# Patient Record
Sex: FEMALE | Race: WHITE | NOT HISPANIC OR LATINO | Employment: FULL TIME | URBAN - METROPOLITAN AREA
[De-identification: names, ages, dates, MRNs, and addresses within clinical notes are randomized per-mention and may not be internally consistent; named-entity substitution may affect disease eponyms.]

---

## 2017-01-29 ENCOUNTER — OFFICE VISIT (OUTPATIENT)
Dept: RETAIL CLINIC | Facility: CLINIC | Age: 47
End: 2017-01-29

## 2017-01-29 VITALS
TEMPERATURE: 97.5 F | RESPIRATION RATE: 16 BRPM | SYSTOLIC BLOOD PRESSURE: 106 MMHG | OXYGEN SATURATION: 99 % | HEART RATE: 82 BPM | DIASTOLIC BLOOD PRESSURE: 72 MMHG

## 2017-01-29 DIAGNOSIS — Z20.828 EXPOSURE TO THE FLU: Primary | ICD-10-CM

## 2017-01-29 PROCEDURE — 99212 OFFICE O/P EST SF 10 MIN: CPT | Performed by: NURSE PRACTITIONER

## 2017-01-29 RX ORDER — LEVOTHYROXINE AND LIOTHYRONINE 38; 9 UG/1; UG/1
60 TABLET ORAL DAILY
COMMUNITY
End: 2018-05-23 | Stop reason: ALTCHOICE

## 2017-01-29 RX ORDER — OSELTAMIVIR PHOSPHATE 75 MG/1
75 CAPSULE ORAL DAILY
Qty: 10 CAPSULE | Refills: 0 | Status: SHIPPED | OUTPATIENT
Start: 2017-01-29 | End: 2017-02-08

## 2017-01-29 RX ORDER — OSELTAMIVIR PHOSPHATE 75 MG/1
75 CAPSULE ORAL DAILY
Qty: 10 CAPSULE | Refills: 0 | Status: SHIPPED | OUTPATIENT
Start: 2017-01-29 | End: 2017-01-29

## 2017-01-29 NOTE — MR AVS SNAPSHOT
Katia CLAUDIO Cindi   1/29/2017 2:15 PM   Office Visit    Dept Phone:  [phone]   Encounter #:  [id number]    Provider:  PROVIDER BISI LACKEY   Department:  [facility]                Your Full Care Plan              Today's Medication Changes          These changes are accurate as of: 1/29/17  2:52 PM.  If you have any questions, ask your nurse or doctor.               New Medication(s)Ordered:     oseltamivir 75 MG capsule   Commonly known as:  TAMIFLU   Take 1 capsule by mouth Daily for 10 days.         Stop taking medication(s)listed here:     SYNTHROID 100 MCG tablet   Generic drug:  levothyroxine                Where to Get Your Medications      These medications were sent to [facility] [address] [place] [address] [address] AT [facility] [phone] [facility] [phone]   [address], [facility] [address]    Hours:  24-hours Phone:  [phone]     oseltamivir 75 MG capsule                  Your Updated Medication List          This list is accurate as of: 1/29/17  2:52 PM.  Always use your most recent med list.                ALPRAZolam 1 MG tablet   Commonly known as:  XANAX       atomoxetine 18 MG capsule   Commonly known as:  STRATTERA   Take 1 capsule by mouth daily.       estradiol 0.1 MG/24HR patch   Commonly known as:  MINIVELPATT VIVELLE-DOT   Place 1 patch on the skin 2 (two) times a week.       fluconazole 150 MG tablet   Commonly known as:  DIFLUCAN   Take 1 tablet by mouth daily.       hydrocortisone 2.5 % rectal cream   Commonly known as:  ANUSOL-HC   Apply rectally 2 times daily       ibuprofen 800 MG tablet   Commonly known as:  ADVIL,MOTRIN   Take 1 tablet by mouth 3 (Three) Times a Day With Meals.       multivitamin with fluoride/iron 0.25-10 MG/ML solution solution       oseltamivir 75 MG capsule   Commonly known as:  TAMIFLU   Take 1 capsule by mouth Daily for 10 days.       progesterone 100 MG capsule      Commonly known as:  PROMETRIUM       pseudoephedrine-guaifenesin  MG per 12 hr tablet   Commonly known as:  MUCINEX D   Take 1 tablet by mouth Every 12 (Twelve) Hours.       rosuvastatin 5 MG tablet   Commonly known as:  CRESTOR       Thyroid 60 MG tablet   Commonly known as:  ARMOUR               You Were Diagnosed With        Codes Comments    Exposure to the flu    -  Primary ICD-10-CM: Z20.828  ICD-9-CM: V01.79       Instructions    Flu exposure. You have been prescribed tamiflu for prophalacticly for flu exposure.     Patient Instructions History      Upcoming Appointments     Visit Type Date Time Department    OFFICE VISIT 2017  2:15 PM [facility]      [employer] Signup     [facility] [employer] allows you to send messages to your doctor, view your test results, renew your prescriptions, schedule appointments, and more. To sign up, go to [facility] and click on the Sign Up Now link in the New User? box. Enter your [employer] Activation Code exactly as it appears below along with the last four digits of your Social Security Number and your Date of Birth () to complete the sign-up process. If you do not sign up before the expiration date, you must request a new code.    [employer] Activation Code: [email or web address]  Expires: 2017  2:52 PM    If you have questions, you can email [email or web address] or call [phone] to talk to our [employer] staff. Remember, [employer] is NOT to be used for urgent needs. For medical emergencies, dial 911.               Other Info from Your Visit           Allergies     No Known Allergies      Reason for Visit     flu exposure           Vital Signs     Blood Pressure Pulse Temperature Respirations Oxygen Saturation Smoking Status    106/72 82 97.5 °F (36.4 °C) 16 99% Never Smoker      Problems and Diagnoses Noted     Exposure to the flu    -  Primary

## 2017-01-29 NOTE — PROGRESS NOTES
Subjective:     Katia Puente is a 46 y.o.     HPI   Patient presents with exposure to flu. Her son was just diagnosed with the flu in this clinic today. She has had a headache but no other symptoms. She did not have an influenza vaccine this season.      The following portions of the patient's history were reviewed and updated as appropriate: allergies, current medications, past family history, past medical history, past social history, past surgical history and problem list.      Review of Systems   Constitutional: Negative for appetite change, fatigue and fever.   HENT: Negative for congestion and sore throat.    Respiratory: Negative for cough, shortness of breath and wheezing.    Cardiovascular: Negative.    Gastrointestinal: Negative for diarrhea, nausea and vomiting.   Musculoskeletal: Negative for myalgias.   Skin: Negative for color change, pallor and rash.   Allergic/Immunologic: Positive for environmental allergies.   Neurological: Negative for dizziness, light-headedness and headaches.         Objective:      Physical Exam   Constitutional: She is oriented to person, place, and time. She appears well-developed. She is cooperative.   HENT:   Head: Normocephalic and atraumatic.   Eyes: EOM are normal. Pupils are equal, round, and reactive to light.   Neck: Normal range of motion. Neck supple.   Cardiovascular: Normal rate, regular rhythm, S1 normal, S2 normal and normal heart sounds.    Pulmonary/Chest: Effort normal and breath sounds normal.   Abdominal: Soft. Normal appearance and bowel sounds are normal. There is no tenderness.   Musculoskeletal: Normal range of motion.   Lymphadenopathy:     She has no cervical adenopathy.   Neurological: She is alert and oriented to person, place, and time.   Skin: Skin is warm, dry and intact.   Psychiatric: She has a normal mood and affect. Her speech is normal and behavior is normal.   Vitals reviewed.          Katia was seen today for flu  exposure.    Diagnoses and all orders for this visit:    Exposure to the flu    Other orders  -     Discontinue: oseltamivir (TAMIFLU) 75 MG capsule; Take 1 capsule by mouth Daily for 10 days.  -     oseltamivir (TAMIFLU) 75 MG capsule; Take 1 capsule by mouth Daily for 10 days.

## 2017-03-09 ENCOUNTER — OFFICE VISIT (OUTPATIENT)
Dept: FAMILY MEDICINE CLINIC | Facility: CLINIC | Age: 47
End: 2017-03-09

## 2017-03-09 VITALS
HEIGHT: 61 IN | TEMPERATURE: 97.9 F | HEART RATE: 96 BPM | WEIGHT: 132 LBS | DIASTOLIC BLOOD PRESSURE: 72 MMHG | RESPIRATION RATE: 16 BRPM | BODY MASS INDEX: 24.92 KG/M2 | SYSTOLIC BLOOD PRESSURE: 106 MMHG | OXYGEN SATURATION: 99 %

## 2017-03-09 DIAGNOSIS — R53.83 FATIGUE, UNSPECIFIED TYPE: Primary | ICD-10-CM

## 2017-03-09 DIAGNOSIS — F41.9 ANXIETY: ICD-10-CM

## 2017-03-09 PROCEDURE — 99214 OFFICE O/P EST MOD 30 MIN: CPT | Performed by: NURSE PRACTITIONER

## 2017-03-09 RX ORDER — BUSPIRONE HYDROCHLORIDE 10 MG/1
10 TABLET ORAL 3 TIMES DAILY
Qty: 90 TABLET | Refills: 1 | Status: SHIPPED | OUTPATIENT
Start: 2017-03-09 | End: 2017-06-16

## 2017-03-09 NOTE — PROGRESS NOTES
Subjective   Katia Puente is a 46 y.o. female.     History of Present Illness   Patient presents to office with CC of fatigue and generally not feeling well.  [facility] and management of perimenopausal symptoms.  Patient states she had her levothyroxine changed to armour thyroid at last visit and her estradiol was changed to oral estrogen and testosterone supplement.  She had lab work done yesterday at [facility] for recheck but has not heard results yet.  Patient also reports a lot of stress and anxiety due to family stress.  Her son has been in trouble at school and experimented with drugs.  She is unsure if her symptoms are related to change in medications or her stress and lack of sleep.    The following portions of the patient's history were reviewed and updated as appropriate: allergies, current medications, past family history, past medical history, past social history, past surgical history and problem list.    Review of Systems   Constitutional: Positive for fatigue. Negative for unexpected weight change.   Respiratory: Negative for shortness of breath.    Cardiovascular: Negative for chest pain and palpitations.   Psychiatric/Behavioral: Positive for decreased concentration and sleep disturbance. Negative for behavioral problems, self-injury and suicidal ideas. The patient is nervous/anxious.        Objective   Physical Exam   Constitutional: She is oriented to person, place, and time. She appears well-developed and well-nourished.   Cardiovascular: Normal rate and regular rhythm.    Pulmonary/Chest: Effort normal and breath sounds normal.   Neurological: She is alert and oriented to person, place, and time.   Psychiatric: She has a normal mood and affect. Judgment normal.   Vitals reviewed.      Assessment/Plan   Katia was seen today for fatigue and illness.    Diagnoses and all orders for this visit:    Fatigue, unspecified type  -     Comprehensive  metabolic panel  -     CBC & Differential    Anxiety  -     busPIRone (BUSPAR) 10 MG tablet; Take 1 tablet by mouth 3 (Three) Times a Day.             Called [facility] and received copy of labs.  T3 had not resulted but other thyroid labs in range.  Vitamin D and ferritin were also normal. Patient has not had CBC or CMP so will order.  She will f/u with Dr. Yusuf. She was started on buspar today for anxiety.

## 2017-03-25 ENCOUNTER — APPOINTMENT (OUTPATIENT)
Dept: GENERAL RADIOLOGY | Facility: HOSPITAL | Age: 47
End: 2017-03-25

## 2017-03-25 PROCEDURE — 71020 HC CHEST PA AND LATERAL: CPT | Performed by: FAMILY MEDICINE

## 2017-03-25 PROCEDURE — 71020 XR CHEST 2 VW: CPT | Performed by: FAMILY MEDICINE

## 2017-06-16 ENCOUNTER — OFFICE VISIT (OUTPATIENT)
Dept: OBSTETRICS AND GYNECOLOGY | Age: 47
End: 2017-06-16

## 2017-06-16 VITALS
WEIGHT: 137 LBS | DIASTOLIC BLOOD PRESSURE: 70 MMHG | HEIGHT: 62 IN | BODY MASS INDEX: 25.21 KG/M2 | SYSTOLIC BLOOD PRESSURE: 120 MMHG

## 2017-06-16 DIAGNOSIS — N63.0 BREAST LUMP: Primary | ICD-10-CM

## 2017-06-16 PROCEDURE — 99214 OFFICE O/P EST MOD 30 MIN: CPT | Performed by: NURSE PRACTITIONER

## 2017-06-16 RX ORDER — THYROID,PORK 15 MG
15 TABLET ORAL DAILY
COMMUNITY
Start: 2017-06-05 | End: 2021-11-01 | Stop reason: ALTCHOICE

## 2017-06-16 RX ORDER — DOXYCYCLINE HYCLATE 100 MG
TABLET ORAL
COMMUNITY
Start: 2017-04-23 | End: 2019-03-05 | Stop reason: SDUPTHER

## 2017-06-16 RX ORDER — CETIRIZINE HYDROCHLORIDE 5 MG/1
5 TABLET ORAL DAILY
COMMUNITY
End: 2020-12-03 | Stop reason: HOSPADM

## 2017-06-16 NOTE — PROGRESS NOTES
Subjective   Katia Puente is a 46 y.o. female is here today as a self referral.    History of Present Illness   Chief Complaint   Patient presents with   • Breast Problem     Pea sized lump - right breast. Pt started pellet therapy about 1 month ago.      Patient has noticed a small lump in right breast.  This is a new problem and she is a new patient to me.  She has noticed it for about a month.  It has gotten slightly bigger but then smaller again. She has had some similar areas in the past that have gone away.  No pain, skin changes, nipple discharge. She did recently start pellet therapy for menopausal symptoms.  She believes the first dose was too high but they adjusted it and now feels better.  She denies any family history of breast cancer.  She had a screening mammogram last august.    The following portions of the patient's history were reviewed and updated as appropriate: allergies, current medications, past family history, past medical history, past social history, past surgical history and problem list.    Review of Systems   Constitutional: Negative.    HENT: Negative.    Eyes: Negative.    Respiratory: Negative.    Cardiovascular: Negative.    Gastrointestinal: Negative.    Endocrine: Negative.    Genitourinary: Negative.    Musculoskeletal: Negative.    Skin: Negative.    Allergic/Immunologic: Negative.    Neurological: Negative.    Hematological: Negative.    Psychiatric/Behavioral: Negative.        Objective   Physical Exam   Constitutional: She is oriented to person, place, and time. She appears well-developed and well-nourished.   Pulmonary/Chest: Effort normal. Right breast exhibits mass. Right breast exhibits no inverted nipple, no nipple discharge, no skin change and no tenderness. Left breast exhibits no inverted nipple, no mass, no nipple discharge, no skin change and no tenderness.       Bilateral scars on breast from previous surgery.  Very small, mobile mass on right, inner breast.   Is is about .5cm in size and round.  No skin changes or tenderness.  It does feel very superficial.   Genitourinary:   Genitourinary Comments: No bleeding following pap   Neurological: She is alert and oriented to person, place, and time.   Skin: Skin is warm and dry.   Psychiatric: She has a normal mood and affect.         Assessment/Plan   Katia was seen today for breast problem.    Diagnoses and all orders for this visit:    Breast lump  -     Mammo Diagnostic Right With CAD  -     US breast right complete      Plan mammogram and ultrasound and follow up in 1 month for breast check and AE as scheduled.

## 2017-06-21 ENCOUNTER — HOSPITAL ENCOUNTER (OUTPATIENT)
Dept: ULTRASOUND IMAGING | Facility: HOSPITAL | Age: 47
Discharge: HOME | End: 2017-06-21

## 2017-06-21 ENCOUNTER — HOSPITAL ENCOUNTER (OUTPATIENT)
Dept: MAMMOGRAPHY | Facility: HOSPITAL | Age: 47
Discharge: HOME | End: 2017-06-21
Admitting: NURSE PRACTITIONER

## 2017-06-21 PROCEDURE — 76642 ULTRASOUND BREAST LIMITED: CPT

## 2017-06-21 PROCEDURE — G0206 DX MAMMO INCL CAD UNI: HCPCS

## 2017-06-28 ENCOUNTER — TELEPHONE (OUTPATIENT)
Dept: MAMMOGRAPHY | Age: 47
End: 2017-06-28

## 2017-06-28 NOTE — TELEPHONE ENCOUNTER
----- Message from Rene Kaur MD sent at 6/28/2017  8:35 AM EDT -----  Please notify patient of results and schedule recommended follow-up

## 2017-06-28 NOTE — TELEPHONE ENCOUNTER
Informed pt mammogram recommends followup bilateral mammogram in August and RT breast ultrasound to assure the stability of mass.  Put order in EP[facility].

## 2017-08-21 ENCOUNTER — OFFICE VISIT (OUTPATIENT)
Dept: OBSTETRICS AND GYNECOLOGY | Age: 47
End: 2017-08-21

## 2017-08-21 VITALS
DIASTOLIC BLOOD PRESSURE: 72 MMHG | HEIGHT: 61 IN | BODY MASS INDEX: 25.68 KG/M2 | SYSTOLIC BLOOD PRESSURE: 116 MMHG | WEIGHT: 136 LBS

## 2017-08-21 DIAGNOSIS — Z71.89 COUNSELING FOR HORMONE REPLACEMENT THERAPY: ICD-10-CM

## 2017-08-21 DIAGNOSIS — Z01.419 ENCOUNTER FOR GYNECOLOGICAL EXAMINATION: Primary | ICD-10-CM

## 2017-08-21 PROCEDURE — 99396 PREV VISIT EST AGE 40-64: CPT | Performed by: OBSTETRICS & GYNECOLOGY

## 2017-08-21 NOTE — PROGRESS NOTES
"Subjective     Chief Complaint   Patient presents with   • Gynecologic Exam     Annual:discuss increased vaginal/perineum pressure         History of Present Illness    Katia Puente is a very pleasant 47 y.o. female who presents for annual exam., Mammo Exam2017  , Contraception hyst, Exercise 2 x wkly.Patient has been on bioidentical hormones for a while and we have discussed the inherent risk that. She is exercising 2 times a week. 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 She sees Dr. swanson for her hormone therapy.      Obstetric History:  OB History      Para Term  AB TAB SAB Ectopic Multiple Living    5 2 2  3  3            Menstrual History:     No LMP recorded. Patient has had a hysterectomy.       Sexual History:       Past Medical History:   Diagnosis Date   • Allergic    • Breast pain, left    • Elevated cholesterol    • Hemorrhoids 2012   • Hypothyroidism    • Vaginal atrophy       Past Surgical History:   Procedure Laterality Date   • BREAST SURGERY     • ENDOSCOPY  2013   • HYSTERECTOMY     • TONSILLECTOMY         SOCIAL Hx:      The following portions of the patient's history were reviewed and updated as appropriate: allergies, current medications, past family history, past medical history, past social history, past surgical history and problem list.    Review of Systems        Except as outlined in history of physical illness, patient denies any changes in her GYN, , GI systems. All other systems reviewed are negative         Objective   Physical Exam    /72  Ht 60.5\" (153.7 cm)  Wt 136 lb (61.7 kg)  BMI 26.12 kg/m2    General: Patient is alert and oriented and appears overall healthy  Neck: Is supple without thyromegaly, no carotid bruits and no lymphadenopathy  Lungs: Clear bilaterally, no wheezing, rhonchi, or rales.  Respiratory rate is normal  Breast: Symmetrical, no lymphadenopathy, no masses, no erythema., Well-healed surgical " scars  Heart: Regular rate and rhythm are appreciated, no murmurs or rubs are heard  Abdomen: Is soft, without organomegaly, bowel sounds are positive, there is no                                rebound or guarding and palpation does not produce any discomfort  Back: Nontender without CVA tenderness  Pelvic: External genitalia appear normal and consistent with mature female.  BUS normal                            Vagina is clean dry without discharge and appears adequately estrogenized, no               lesions or masses are present                         Adnexa are non-enlarged, non tender               Rectal exam reveals adequate sphincter tone and no masses or lesions are                     appreciated on digital rectal examination.       Patient Active Problem List   Diagnosis   • Hemorrhoids   • Hypothyroidism   • Stress disorder, acute   • ADD (attention deficit disorder)   • Postmenopausal HRT (hormone replacement therapy)               Assessment/Plan   Katia was seen today for gynecologic exam.    Diagnoses and all orders for this visit:    Encounter for gynecological examination  -     IGP, Apt HPV,rfx 16 / 18,45    Counseling for hormone replacement therapy  Patient is having all of her hormone therapy managed by Dr. Yusuf she and I have had multiple discussions regarding the potential concerns of her therapy. She also is getting her labs including cholesterol and wellness checks there. She is due for a follow-up mammogram potential ultrasound initially at the end of August but is moved to September. I have stressed the importance of that. 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  Continue to recommend regular exercise to include cardiovascular and resistance training and breast self-exam. Wellness lab, mammography, pap smear, in accordance with age guidelines.  Dietary  management including overall nutrition, caloric intake, and adequate calcium ingestion were reviewed.        All of the patient's questions were addressed and answered, I have encouraged her to call for today's test results if she has not received them within 10 days.  Patient is advised to call with any change in her condition or with any other questions, otherwise return in 12 months for annual examination.

## 2017-08-23 LAB
CYTOLOGIST CVX/VAG CYTO: NORMAL
CYTOLOGY CVX/VAG DOC THIN PREP: NORMAL
DX ICD CODE: NORMAL
DX ICD CODE: NORMAL
HIV 1 & 2 AB SER-IMP: NORMAL
HPV I/H RISK 4 DNA CVX QL PROBE+SIG AMP: NEGATIVE
OTHER STN SPEC: NORMAL
PATH REPORT.FINAL DX SPEC: NORMAL
STAT OF ADQ CVX/VAG CYTO-IMP: NORMAL

## 2017-09-07 ENCOUNTER — HOSPITAL ENCOUNTER (OUTPATIENT)
Dept: MAMMOGRAPHY | Facility: HOSPITAL | Age: 47
Discharge: HOME | End: 2017-09-07
Attending: OBSTETRICS & GYNECOLOGY | Admitting: OBSTETRICS & GYNECOLOGY

## 2017-09-07 ENCOUNTER — HOSPITAL ENCOUNTER (OUTPATIENT)
Dept: ULTRASOUND IMAGING | Facility: HOSPITAL | Age: 47
Discharge: HOME | End: 2017-09-07
Attending: OBSTETRICS & GYNECOLOGY

## 2017-09-07 DIAGNOSIS — N63.0 BREAST MASS: ICD-10-CM

## 2017-09-07 PROCEDURE — G0204 DX MAMMO INCL CAD BI: HCPCS

## 2017-12-15 ENCOUNTER — TELEPHONE (OUTPATIENT)
Dept: OBSTETRICS AND GYNECOLOGY | Age: 47
End: 2017-12-15

## 2017-12-15 ENCOUNTER — TELEPHONE (OUTPATIENT)
Dept: FAMILY MEDICINE CLINIC | Facility: CLINIC | Age: 47
End: 2017-12-15

## 2017-12-15 NOTE — TELEPHONE ENCOUNTER
Patient calling and is experiencing a fissure. She hasn't had one in a long time and is very uncomfortable. Would like something called in for relief and OTC meds are not working. I told patient she may need an appt; please advise

## 2017-12-15 NOTE — TELEPHONE ENCOUNTER
Dr SNYDER pt, states she has been dealing with a painful anal fissure for about 10 days now but has a hx of them. Pt states OTC meds are net helping, and currently is using a sample of an Anusol rectal suppository 2.5% she received from a clinic, minor relief and very painful to use. Please advise.    Pt # [phone]

## 2017-12-15 NOTE — TELEPHONE ENCOUNTER
not'd pt. Pt states she does not want to go through surgery at this time. Wondering if there is another med that we can recommend that would help. Pt wants message sent to Dr larios.

## 2017-12-17 RX ORDER — LIDOCAINE HYDROCHLORIDE AND HYDROCORTISONE ACETATE 30; 25 MG/G; MG/G
GEL RECTAL
Qty: 21 EACH | Refills: 2 | Status: SHIPPED | OUTPATIENT
Start: 2017-12-17 | End: 2019-03-05 | Stop reason: SDUPTHER

## 2017-12-18 NOTE — TELEPHONE ENCOUNTER
In reviewing the records it looks like her family physician sent something in last week. Please notify

## 2018-04-03 ENCOUNTER — TELEPHONE (OUTPATIENT)
Dept: OBSTETRICS AND GYNECOLOGY | Age: 48
End: 2018-04-03

## 2018-04-03 NOTE — TELEPHONE ENCOUNTER
Pt states something is falling out. States no pain but something feels different down there. Pt requested to see Dr. Kaur only.

## 2018-04-16 ENCOUNTER — TELEPHONE (OUTPATIENT)
Dept: OBSTETRICS AND GYNECOLOGY | Age: 48
End: 2018-04-16

## 2018-04-23 ENCOUNTER — OFFICE VISIT (OUTPATIENT)
Dept: OBSTETRICS AND GYNECOLOGY | Age: 48
End: 2018-04-23

## 2018-04-23 VITALS
SYSTOLIC BLOOD PRESSURE: 124 MMHG | DIASTOLIC BLOOD PRESSURE: 70 MMHG | BODY MASS INDEX: 23.6 KG/M2 | HEIGHT: 61 IN | WEIGHT: 125 LBS

## 2018-04-23 DIAGNOSIS — K62.89 RECTAL PAIN: Primary | ICD-10-CM

## 2018-04-23 DIAGNOSIS — N81.6 BADEN-WALKER GRADE 1 RECTOCELE: ICD-10-CM

## 2018-04-23 PROCEDURE — 99213 OFFICE O/P EST LOW 20 MIN: CPT | Performed by: OBSTETRICS & GYNECOLOGY

## 2018-04-23 RX ORDER — THYROID 60 MG
60 TABLET ORAL DAILY
COMMUNITY
Start: 2018-03-11 | End: 2021-11-01 | Stop reason: ALTCHOICE

## 2018-04-23 RX ORDER — LISDEXAMFETAMINE DIMESYLATE 30 MG/1
CAPSULE ORAL
COMMUNITY
Start: 2018-04-03 | End: 2019-03-05 | Stop reason: SDUPTHER

## 2018-04-23 NOTE — PROGRESS NOTES
Subjective     Chief Complaint   Patient presents with   • Gynecologic Exam     Problem:pelvic pain,rectal/vaginal pressure,rectal bleeding       History of Present Illness  Katia Puente is a 47 y.o. female is being seen today for Evaluation of ongoing rectal pain and possible rectocele. Patient underwent a TVH and posterior repair and bladder sling with another physician 8 years ago  She felt something bulging several weeks ago after some exertion. She also had some discomfort at that time. That discomfort has resolved. She has no pain now and has had no bleeding    Additionally she is been treated by her PCP for possible anal fissures. She is known to have a hemorrhoid that is not currently bothersome  Chief Complaint   Patient presents with   • Gynecologic Exam     Problem:pelvic pain,rectal/vaginal pressure,rectal bleeding   .        The following portions of the patient's history were reviewed and updated as appropriate: allergies, current medications, past family history, past medical history, past social history, past surgical history and problem list.    PAST MEDICAL HISTORY  Past Medical History:   Diagnosis Date   • Allergic    • Breast pain, left    • Elevated cholesterol    • Hemorrhoids 2012   • Hypothyroidism    • Vaginal atrophy      OB History      Para Term  AB Living    5 2 2  3     SAB TAB Ectopic Molar Multiple Live Births    3             Past Surgical History:   Procedure Laterality Date   • BREAST SURGERY     • ENDOSCOPY  2013   • HYSTERECTOMY     • TONSILLECTOMY       Family History   Problem Relation Age of Onset   • Heart disease Mother      History   Smoking Status   • Never Smoker   Smokeless Tobacco   • Never Used       Current Outpatient Prescriptions:   •  ARMOUR THYROID 15 MG tablet, , Disp: , Rfl:   •  ARMOUR THYROID 90 MG tablet, , Disp: , Rfl:   •  cetirizine (zyrTEC) 5 MG tablet, Take 5 mg by mouth., Disp: , Rfl:   •  Ped Multivitamins-Fl-Iron  (MULTIVITAMIN WITH FLUORIDE/IRON) 0.25-10 MG/ML solution solution, Take  by mouth daily., Disp: , Rfl:   •  progesterone (PROMETRIUM) 100 MG capsule, Take 100 mg by mouth daily., Disp: , Rfl:   •  rosuvastatin (CRESTOR) 5 MG tablet, Take 5 mg by mouth daily. Take 1 tablet by oral route once daily for 90 days, Disp: , Rfl:   •  Thyroid 60 MG PO tablet, Take 60 mg by mouth Daily., Disp: , Rfl:   •  doxycycline (VIBRAMYICN) 100 MG tablet, , Disp: , Rfl:   •  Lidocaine-Hydrocortisone Ace 3-2.5 % kit, Use TID prn, Disp: 21 each, Rfl: 2  •  VYVANSE 30 MG capsule, , Disp: , Rfl:     There is no immunization history on file for this patient.    Review of Systems       Except as outlined in history of physical illness, patient denies any changes in her GYN, , GI systems. All other systems reviewed are negative.    Objective   Physical Exam   Alert and oriented, respirations unlabored, heart regular rate and rhythm   Pelvic external genitalia normal, vagina is clean dry and intact, there is adequate estrogenization. Vaginal vault seems well supported no cystocele. She has a first-degree rectocele with bearing down and coughing. Additionally there is a small nonthrombosed hemorrhoid that is asymptomatic. Rectal exam is uncomfortable and it simply confirms the first-degree rectocele.      Assessment/Plan   Katia was seen today for gynecologic exam.    Diagnoses and all orders for this visit:    Rectal pain  As outlined above. Patient is status post TVH, sling, posterior repair that was performed by another physician approximately 8 years ago. She is presumed to potentially have some fissures based on prior histories. She would like further evaluation for this.  -     Ambulatory Referral to Colorectal Surgery    [place] grade 1 rectocele  This is new as outlined above, because it is only a grade 1 she does not want to proceed with any further therapy at this stage. Talked about potential treatment options including  physical therapy, observation, repeat surgical repairs. I would have to get the operative notes from her previous gynecologist if she wants to proceed along those lines. Currently she is leaning towards observation and I would agree               EMR Dragon/ Transcription disclaimer:  Much of the encounter note is an electronic transcription/translation of spoken language to printed text. The electronic translation of spoken language may permit erroneous, or at times, nonessential words or phrases to be inadvertently transcribes; Although i have reviewed the note for such errors, some may still exist.

## 2018-05-14 ENCOUNTER — OFFICE VISIT (OUTPATIENT)
Dept: FAMILY MEDICINE CLINIC | Facility: CLINIC | Age: 48
End: 2018-05-14

## 2018-05-14 VITALS
SYSTOLIC BLOOD PRESSURE: 110 MMHG | HEIGHT: 61 IN | DIASTOLIC BLOOD PRESSURE: 66 MMHG | WEIGHT: 128 LBS | RESPIRATION RATE: 16 BRPM | TEMPERATURE: 97.6 F | HEART RATE: 87 BPM | BODY MASS INDEX: 24.17 KG/M2

## 2018-05-14 DIAGNOSIS — M54.10 RADICULAR LOW BACK PAIN: Primary | ICD-10-CM

## 2018-05-14 PROCEDURE — 99214 OFFICE O/P EST MOD 30 MIN: CPT | Performed by: FAMILY MEDICINE

## 2018-05-14 RX ORDER — TRAMADOL HYDROCHLORIDE 50 MG/1
50 TABLET ORAL EVERY 6 HOURS PRN
Qty: 40 TABLET | Refills: 1 | Status: SHIPPED | OUTPATIENT
Start: 2018-05-14 | End: 2019-03-05 | Stop reason: SDUPTHER

## 2018-05-14 RX ORDER — DICLOFENAC 35 MG/1
35 CAPSULE ORAL 3 TIMES DAILY
Qty: 90 CAPSULE | Refills: 2 | Status: SHIPPED | OUTPATIENT
Start: 2018-05-14 | End: 2018-08-14 | Stop reason: SDUPTHER

## 2018-05-14 RX ORDER — CYCLOBENZAPRINE HCL 10 MG
10 TABLET ORAL 2 TIMES DAILY PRN
Qty: 60 TABLET | Refills: 2 | Status: SHIPPED | OUTPATIENT
Start: 2018-05-14 | End: 2019-03-05 | Stop reason: SDUPTHER

## 2018-05-14 RX ORDER — METHYLPREDNISOLONE 4 MG/1
TABLET ORAL
Qty: 21 TABLET | Refills: 0 | Status: SHIPPED | OUTPATIENT
Start: 2018-05-14 | End: 2018-05-23

## 2018-05-14 NOTE — PROGRESS NOTES
"Subjective   Katia Puente is a 47 y.o. female.     CC: Back/Hip Pain    History of Present Illness     Pt comes in today c/o right back/hip pain for > 2 weeks. Went to chiropractor yet sadly not better. Does have a h/o moderate scoliosis known. No relief with the chiropractor and did have to move her daughter last weekend (was hurting prior). Getting up/down hurts most and feels better when active, until stops, then worse. All started first time when arising from a chair.   The pain radiates from the right back to the right hamstring, then foot. \"Shooting pain\". Aleve not helpful this time, either.   Had recent XRAYs of the back at her chiropractor.    The following portions of the patient's history were reviewed and updated as appropriate: allergies, current medications, past family history, past medical history, past social history, past surgical history and problem list.    Review of Systems   Constitutional: Negative for activity change, chills, fatigue and fever.   Respiratory: Negative for cough and chest tightness.    Cardiovascular: Negative for chest pain and palpitations.   Gastrointestinal: Negative for abdominal pain and nausea.   Endocrine: Negative for cold intolerance.   Musculoskeletal: Positive for back pain.        Hip pain   Psychiatric/Behavioral: Negative for behavioral problems and dysphoric mood.     /66   Pulse 87   Temp 97.6 °F (36.4 °C) (Oral)   Resp 16   Ht 154.9 cm (61\")   Wt 58.1 kg (128 lb)   BMI 24.19 kg/m²     Objective   Physical Exam   Constitutional: She appears well-developed and well-nourished. She appears distressed.   Neck: Neck supple. No thyromegaly present.   Cardiovascular: Normal rate and regular rhythm.    No murmur heard.  Pulmonary/Chest: Effort normal and breath sounds normal.   Abdominal: Bowel sounds are normal.   Neurological: She has normal reflexes.   Positive SLR right; along with pain to palp.of the right facet region, lower L-Spine and the " right SI joint, too.   Psychiatric: She has a normal mood and affect. Her behavior is normal.   Nursing note and vitals reviewed.  Luisa present for exam.    Assessment/Plan   Katia was seen today for hip pain, sciatica and back pain.    Diagnoses and all orders for this visit:    Radicular low back pain  -     MethylPREDNISolone (MEDROL) 4 MG tablet; follow package directions  -     Diclofenac 35 MG capsule; Take 35 mg by mouth 3 (Three) Times a Day.  -     cyclobenzaprine (FLEXERIL) 10 MG tablet; Take 1 tablet by mouth 2 (Two) Times a Day As Needed for Muscle Spasms.  -     traMADol (ULTRAM) 50 MG tablet; Take 1 tablet by mouth Every 6 (Six) Hours As Needed for Moderate Pain .  -     MRI Lumbar Spine Without Contrast; Future

## 2018-05-18 ENCOUNTER — APPOINTMENT (OUTPATIENT)
Dept: MRI IMAGING | Facility: HOSPITAL | Age: 48
End: 2018-05-18

## 2018-05-18 ENCOUNTER — HOSPITAL ENCOUNTER (EMERGENCY)
Facility: HOSPITAL | Age: 48
Discharge: HOME | End: 2018-05-18
Attending: EMERGENCY MEDICINE | Admitting: EMERGENCY MEDICINE

## 2018-05-18 VITALS
SYSTOLIC BLOOD PRESSURE: 121 MMHG | OXYGEN SATURATION: 97 % | RESPIRATION RATE: 16 BRPM | TEMPERATURE: 97 F | BODY MASS INDEX: 23.6 KG/M2 | HEART RATE: 72 BPM | WEIGHT: 125 LBS | DIASTOLIC BLOOD PRESSURE: 72 MMHG | HEIGHT: 61 IN

## 2018-05-18 DIAGNOSIS — M51.26 PROTRUSION OF LUMBAR INTERVERTEBRAL DISC: Primary | ICD-10-CM

## 2018-05-18 DIAGNOSIS — M54.16 COMPRESSION OF LUMBAR NERVE ROOT: ICD-10-CM

## 2018-05-18 PROCEDURE — 72148 MRI LUMBAR SPINE W/O DYE: CPT

## 2018-05-18 PROCEDURE — 99283 EMERGENCY DEPT VISIT LOW MDM: CPT

## 2018-05-18 PROCEDURE — 25010000002 DEXAMETHASONE PER 1 MG: Performed by: EMERGENCY MEDICINE

## 2018-05-18 PROCEDURE — 96374 THER/PROPH/DIAG INJ IV PUSH: CPT

## 2018-05-18 RX ORDER — HYDROCODONE BITARTRATE AND ACETAMINOPHEN 5; 325 MG/1; MG/1
1 TABLET ORAL EVERY 6 HOURS PRN
Qty: 20 TABLET | Refills: 0 | Status: SHIPPED | OUTPATIENT
Start: 2018-05-18 | End: 2019-03-05 | Stop reason: SDUPTHER

## 2018-05-18 RX ORDER — DEXAMETHASONE 4 MG/1
4 TABLET ORAL 3 TIMES DAILY
Qty: 20 TABLET | Refills: 0 | Status: SHIPPED | OUTPATIENT
Start: 2018-05-18 | End: 2019-03-05 | Stop reason: SDUPTHER

## 2018-05-18 RX ORDER — GABAPENTIN 300 MG/1
300 CAPSULE ORAL 3 TIMES DAILY
Qty: 60 CAPSULE | Refills: 0 | Status: SHIPPED | OUTPATIENT
Start: 2018-05-18 | End: 2019-03-05 | Stop reason: SDUPTHER

## 2018-05-18 RX ADMIN — DEXAMETHASONE SODIUM PHOSPHATE 10 MG: 10 INJECTION INTRAMUSCULAR; INTRAVENOUS at 14:57

## 2018-05-18 NOTE — DISCHARGE INSTRUCTIONS
Call Dr. Yu's office on Monday (5-21-18) to schedule an appointment for follow up. Return to the ED if you develop foot drop. Foot drop can be tested by trying to walk on your heels. If you are unable to walk on your heels, return to the ED for further treatment.

## 2018-05-18 NOTE — ED PROVIDER NOTES
" EMERGENCY DEPARTMENT ENCOUNTER    CHIEF COMPLAINT  Chief Complaint: Lower back pain  History given by: Patient  History limited by: Nothing  Room Number: 33/33  PMD: Rene Rivera MD      HPI:  Pt is a 47 y.o. female with a hx of scoliosis who presents complaining of lower back pain that began about 1 month ago with symptoms worsening in severity to the point she came to the ED. The pt states she \"threw\" her back out 1 month ago. The pt states she tried to get out of a chair when her back \"locked up\" on her 1 month ago. The pt states the pain gradually improved, but it has begun worsen in her lower back. The pt states the pain now radiates down her anterior right leg. The pt states the pain is improved with rest and worsened with bearing weight. Pt reports right leg weakness, trouble with urination and defecation, and saddle paresthesia.     Duration: 1 month with symptoms worsening in severity to the point she came to the ED  Onset: Gradual  Timing: Constant  Location: Lower back  Radiation: Anterior right leg  Quality: \"Pain\"  Intensity/Severity: Moderate  Progression: Worsening  Associated Symptoms:  Right leg weakness, trouble with urination and defecation, and saddle paresthesia  Aggravating Factors: Bearing weight  Alleviating Factors: Rest  Previous Episodes: The pt has a hx of scoliosis.  Treatment before arrival: Nothing    PAST MEDICAL HISTORY  Active Ambulatory Problems     Diagnosis Date Noted   • Hemorrhoids 05/02/2012   • Hypothyroidism    • Stress disorder, acute 11/09/2015   • ADD (attention deficit disorder) 09/08/2016   • Postmenopausal HRT (hormone replacement therapy) 09/08/2016     Resolved Ambulatory Problems     Diagnosis Date Noted   • No Resolved Ambulatory Problems     Past Medical History:   Diagnosis Date   • Allergic    • Breast pain, left    • Elevated cholesterol    • Hemorrhoids 05/02/2012   • Hypothyroidism    • Vaginal atrophy        PAST SURGICAL HISTORY  Past Surgical History: "   Procedure Laterality Date   • BREAST SURGERY     • ENDOSCOPY  01/2013   • HYSTERECTOMY  2008   • TONSILLECTOMY         FAMILY HISTORY  Family History   Problem Relation Age of Onset   • Heart disease Mother        SOCIAL HISTORY  Social History     Social History   • Marital status:      Spouse name: N/A   • Number of children: N/A   • Years of education: N/A     Occupational History   • Not on file.     Social History Main Topics   • Smoking status: Never Smoker   • Smokeless tobacco: Never Used   • Alcohol use Yes      Comment: current some day; occ   • Drug use: No   • Sexual activity: Yes     Partners: Male     Birth control/ protection: Surgical     Other Topics Concern   • Not on file     Social History Narrative   • No narrative on file       ALLERGIES  Codeine and Erythromycin    REVIEW OF SYSTEMS  Review of Systems   Constitutional: Negative for fever.   HENT: Negative for sore throat.    Eyes: Negative.    Respiratory: Negative for cough and shortness of breath.    Cardiovascular: Negative for chest pain.   Gastrointestinal: Negative for abdominal pain, diarrhea and vomiting.   Genitourinary: Negative for dysuria.        Trouble with urination and defecation   Musculoskeletal: Positive for back pain (Lower that radiates down the anterior right leg). Negative for neck pain.   Skin: Negative for rash.   Allergic/Immunologic: Negative.    Neurological: Positive for weakness (Right leg) and numbness (Saddle paresthesia). Negative for headaches.   Hematological: Negative.    Psychiatric/Behavioral: Negative.    All other systems reviewed and are negative.      PHYSICAL EXAM  ED Triage Vitals   Temp Heart Rate Resp BP SpO2   05/18/18 1122 05/18/18 1122 05/18/18 1122 05/18/18 1141 05/18/18 1122   97 °F (36.1 °C) 84 16 140/92 97 %      Temp src Heart Rate Source Patient Position BP Location FiO2 (%)   -- -- 05/18/18 1141 -- --     Sitting         Physical Exam   Constitutional: She is oriented to person,  place, and time and well-developed, well-nourished, and in no distress. No distress.   HENT:   Head: Normocephalic and atraumatic.   Eyes: EOM are normal. Pupils are equal, round, and reactive to light.   Neck: Normal range of motion. Neck supple.   Cardiovascular: Normal rate, regular rhythm and normal heart sounds.    Pulmonary/Chest: Effort normal and breath sounds normal. No respiratory distress.   Abdominal: Soft. There is no tenderness. There is no rebound and no guarding.   Musculoskeletal: Normal range of motion. She exhibits no edema.   Neurological: She is alert and oriented to person, place, and time. She has normal sensation and normal strength.   Reflex Scores:       Patellar reflexes are 2+ on the right side.       Achilles reflexes are 1+ on the right side.  The pt has slightly decreased plantar flexion on the right when compared to the left.   Skin: Skin is warm and dry. No rash noted.   Psychiatric: Mood and affect normal.   Nursing note and vitals reviewed.      RADIOLOGY  MRI Lumbar Spine Without Contrast - Disc protrusion at L4-L5 with compression of the L4-L5 nerve root on the right with no involvement of the cauda equina      I ordered the above noted radiological studies. Interpreted by radiologist. Discussed with radiologist (Dr. Carnes). Reviewed by me in PACS.       PROCEDURES  Procedures      PROGRESS AND CONSULTS       1409  MRI Lumbar Spine ordered for further evaluation.     1414  Decadron ordered.    1415  Upon initial encounter, discussed the plan to order a MRI for further evaluation. Discussed the plan to treat the pt with steroids to improve her symptoms. The pt understands and agrees with the plan.    1604  BP- 125/78 HR- 78 Temp- 97 °F (36.1 °C) O2 sat- 99%    Rechecked pt, she is resting comfortably. The pt is now being taken to get the MRI.    1714  Call placed to Neurosurgery.    1724  Received a call from Dr. Yu and discussed pt's case. Dr. Yu states he will view the  pt's MRI to look for emergent surgical problems. He will call back once he views the MRI.    1740  BP- 121/72 HR- 80 Temp- 97 °F (36.1 °C) O2 sat- 98%    Rechecked pt, her pain has improved after receiving the Decadron. Discussed the MRI results with the pt including the disc protrusion with compression of the nerve root at L4-L5 seen on the MRI. Discussed the call with Dr. Yu, and the plan to have him view the pt's MRI to look for emergent surgical problems. I informed the pt that if Dr. Yu does not find any emergent surgical problems, the pt will likely be discharged with steroids and the plan to f/u with Dr. Yu in the office. The pt understands and agrees with the plan.    1745  I received a call back from Dr. Yu. He has viewed the MRI and states the pt can be discharged home with steroids and the plan to f/u in the office.     1753  BP- 121/72 HR- 80 Temp- 97 °F (36.1 °C) O2 sat- 98%    Rechecked pt, she is resting comfortably. Discussed the call with Dr. Yu and the plan to discharge the pt with steroids and the plan to call Dr. Yu's office on Monday. I advised the pt to return to the ED if she develops foot drop at home. I advised the pt to avoid going to the chiropractor at this time. The pt understands and agrees with the plan.    MEDICAL DECISION MAKING  Results were reviewed/discussed with the patient and they were also made aware of online access. Pt also made aware that some labs, such as cultures, will not be resulted during ER visit and follow up with PMD is necessary.     MDM  Number of Diagnoses or Management Options     Amount and/or Complexity of Data Reviewed  Tests in the radiology section of CPT®: ordered and reviewed (MRI Lumbar Spine Without Contrast - Disc protrusion at L4-L5 with compression of the L4-L5 nerve root on the right with no involvement of the cauda equina)  Discuss the patient with other providers: yes (Dr. Yu, Neurosurgery  Dr. Carnes, Radiology)    Patient  Progress  Patient progress: stable         DIAGNOSIS  Final diagnoses:   Protrusion of lumbar intervertebral disc (L4-L5)   Compression of lumbar nerve root (L4-L5)       DISPOSITION  DISCHARGE    Patient discharged in stable condition.    Reviewed implications of results, diagnosis, meds, responsibility to follow up, warning signs and symptoms of possible worsening, potential complications and reasons to return to ER.    Patient/Family voiced understanding of above instructions.    Discussed plan for discharge, as there is no emergent indication for admission. Patient referred to primary care provider for BP management due to today's BP. Pt/family is agreeable and understands need for follow up and repeat testing.  Pt is aware that discharge does not mean that nothing is wrong but it indicates no emergency is present that requires admission and they must continue care with follow-up as given below or physician of their choice.     FOLLOW-UP  Gavino Yu IV, MD  [address] [address]  [phone]    Call in 3 days           Medication List      New Prescriptions    dexamethasone 4 MG tablet  Commonly known as:  DECADRON  Take 1 tablet by mouth 3 (Three) Times a Day.     gabapentin 300 MG capsule  Commonly known as:  NEURONTIN  Take 1 capsule by mouth 3 (Three) Times a Day.     HYDROcodone-acetaminophen 5-325 MG per tablet  Commonly known as:  NORCO  Take 1 tablet by mouth Every 6 (Six) Hours As Needed for Severe Pain .              Latest Documented Vital Signs:  As of 6:04 PM  BP- 121/72 HR- 80 Temp- 97 °F (36.1 °C) O2 sat- 98%    --  Documentation assistance provided by lurdes Zafar for Dr. Gant.  Information recorded by the lurdes was done at my direction and has been verified and validated by me.       Dawit Zafar  05/18/18 1800       Andres Gant MD  05/18/18 1912

## 2018-05-18 NOTE — ED TRIAGE NOTES
Patient to er with c/o lower back pain with pain moving down her right leg. Patient stated her muscles in her right leg are now getting weak. Patient stated in triage it's hard for her to have BM because it seems like her muscles are too weak. Patient reported her primary doctor is trying to get her in for MRI. Patient stated she can not wait because of the increase in pain.

## 2018-05-21 ENCOUNTER — TELEPHONE (OUTPATIENT)
Dept: NEUROSURGERY | Facility: CLINIC | Age: 48
End: 2018-05-21

## 2018-05-21 NOTE — TELEPHONE ENCOUNTER
Per Dr Yu needs a follow up early next week as a workin. Has a pretty big lumbar disc. She will be a new patient     I have left her a voice mail to schedule an appointment

## 2018-05-23 ENCOUNTER — OFFICE VISIT (OUTPATIENT)
Dept: NEUROSURGERY | Facility: CLINIC | Age: 48
End: 2018-05-23

## 2018-05-23 VITALS
WEIGHT: 128 LBS | SYSTOLIC BLOOD PRESSURE: 118 MMHG | DIASTOLIC BLOOD PRESSURE: 86 MMHG | BODY MASS INDEX: 24.17 KG/M2 | HEIGHT: 61 IN

## 2018-05-23 DIAGNOSIS — M54.10 RADICULOPATHY OF LEG: Primary | ICD-10-CM

## 2018-05-23 PROCEDURE — 99244 OFF/OP CNSLTJ NEW/EST MOD 40: CPT | Performed by: NEUROLOGICAL SURGERY

## 2018-05-23 NOTE — PROGRESS NOTES
Subjective   Patient ID: Katia Puente is a 47 y.o. female who is here today for follow-up for lumbar pain. She had an MRI of the lumbar spine at [facility] on 5/18/18. She presents unaccompanied.    History of Present Illness 48 yo lady presented to the ER last week for pain and subjective weakness in the RLE.  Mild back pain.  Her pain pain goes to the right fott to the large toe.  The numbness is intermittent.  Changes in position aggravate.  Her pain can be a 9-10 at times.  Her pain is present all time.  This started about 4-5 weeks ago.  Her pain was initailly in the back and then transitioned to the leg.  No loss of b/b.  She feels she empties her bladder.  She has some constipation.  No numbness of the perineum.  She was started on steroids and these helped to some degree as did tramadol.  She responded to steroids in the ER.      The following portions of the patient's history were reviewed and updated as appropriate: allergies, current medications, past family history, past medical history, past social history, past surgical history and problem list.    Review of Systems   Musculoskeletal: Positive for arthralgias (RLE) and back pain.   Neurological: Positive for weakness (RLE) and numbness (RLE).       Objective   Physical Exam   Constitutional: She is oriented to person, place, and time.   Neurological: She is oriented to person, place, and time.   Reflex Scores:       Patellar reflexes are 2+ on the right side and 2+ on the left side.       Achilles reflexes are 1+ on the right side and 1+ on the left side.    Neurologic Exam     Mental Status   Oriented to person, place, and time.     Motor Exam     Strength   Right iliopsoas: 5/5  Left iliopsoas: 5/5  Right quadriceps: 5/5  Left quadriceps: 5/5  Right anterior tibial: 5/5  Left anterior tibial: 5/5  Right gastroc: 5/5  Left gastroc: 5/5  5/5 EHL     Sensory Exam   Right leg light touch: decreased from toes  Left leg light touch: normal  Right leg  pinprick: decreased from toes  Left leg pinprick: normal    Gait, Coordination, and Reflexes     Gait  Gait: (antalgic)    Reflexes   Right patellar: 2+  Left patellar: 2+  Right achilles: 1+  Left achilles: 1+  Right Feldman: absent  Left Feldman: absent  Right ankle clonus: absent  Left ankle clonus: absent     Very antalgic gait.    Small scar on her back      Assessment/Plan   Independent Review of Radiographic Studies:  Right sided L45 and L5S1 disc herniations.  There is a free fragment at L5S1 and what looks like a SL disc bulge at L45.  Mild scoliosis.      Medical Decision Making:  SHe is failing oral steroids and has failed nsaids as well.  She does not care for pain medications.  We discussed that an ILANA would likely not assist.  She is poorly tolerant of gabapentin.  She will likely not tolerate PT.  I offered her a minimally invasive discectomy at L45 and L5S1 on the right.  We discussed r/b/a in great detail.  We discussed csf leaks, need for other procedures, recurrence, nerve damage, weakness, numbness, b/b issues, medical complications.  We discussed post-op course.  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  She will consider her options and call.      Katia was seen today for back pain.    Diagnoses and all orders for this visit:    Radiculopathy of leg      No Follow-up on file.

## 2018-06-21 ENCOUNTER — PATIENT MESSAGE (OUTPATIENT)
Dept: FAMILY MEDICINE CLINIC | Facility: CLINIC | Age: 48
End: 2018-06-21

## 2018-08-14 DIAGNOSIS — M54.10 RADICULAR LOW BACK PAIN: ICD-10-CM

## 2018-08-14 RX ORDER — DICLOFENAC 35 MG/1
CAPSULE ORAL
Qty: 90 CAPSULE | Refills: 0 | OUTPATIENT
Start: 2018-08-14 | End: 2019-05-05

## 2019-02-26 ENCOUNTER — TELEPHONE (OUTPATIENT)
Dept: OBSTETRICS AND GYNECOLOGY | Age: 49
End: 2019-02-26

## 2019-02-26 NOTE — TELEPHONE ENCOUNTER
FYI:  Dr Kaur pt states she has been treated both otc and with prescriptions for yeast, possible uti also as she is almost finished with her bactrim rx. Yet, pt reports she still is having vaginal burning, irritation and itching. Pt requests to be seen this week, pt is scheduled w NP 3/1/19.

## 2019-03-05 ENCOUNTER — OFFICE VISIT (OUTPATIENT)
Dept: OBSTETRICS AND GYNECOLOGY | Age: 49
End: 2019-03-05

## 2019-03-05 VITALS
BODY MASS INDEX: 22.24 KG/M2 | HEIGHT: 61 IN | SYSTOLIC BLOOD PRESSURE: 118 MMHG | DIASTOLIC BLOOD PRESSURE: 82 MMHG | WEIGHT: 117.8 LBS

## 2019-03-05 DIAGNOSIS — Z87.440 HISTORY OF URINARY TRACT INFECTION: ICD-10-CM

## 2019-03-05 DIAGNOSIS — N76.1 SUBACUTE VAGINITIS: Primary | ICD-10-CM

## 2019-03-05 PROCEDURE — 99213 OFFICE O/P EST LOW 20 MIN: CPT | Performed by: NURSE PRACTITIONER

## 2019-03-05 RX ORDER — ALPRAZOLAM 0.5 MG/1
0.5 TABLET ORAL NIGHTLY PRN
COMMUNITY
Start: 2019-02-13

## 2019-03-05 RX ORDER — NYSTATIN 100000 U/G
OINTMENT TOPICAL
Refills: 0 | COMMUNITY
Start: 2019-02-23 | End: 2019-12-13 | Stop reason: HOSPADM

## 2019-03-05 RX ORDER — COVID-19 ANTIGEN TEST
KIT MISCELLANEOUS
COMMUNITY
End: 2019-05-05

## 2019-03-05 NOTE — PROGRESS NOTES
"Subjective   Katia Puente is a 48 y.o. female is being seen today for   Chief Complaint   Patient presents with   • Follow-up   .    History of Present Illness     Patient here to follow up after being treated for UTI a few weeks ago.  She was treated over the phone by another doctor for UTI and possible yeast infection.  Her UTI symptoms improved with bactrim but she continued to have some vaginal irritation and itching after diflucan and monistat.  Since she called to schedule the visit her symptoms have mostly resolved and she currently only has some slight vaginal irritation.  No current itching or change in discharge. She still wanted to recheck her urine and make sure the UTI was gone. She see's a \"hormone specialist\" and uses bio-identical hormones as well and her menopausal symptoms have been well controlled with that.    The following portions of the patient's history were reviewed and updated as appropriate: allergies, current medications, past family history, past medical history, past social history, past surgical history and problem list.    /82   Ht 154.9 cm (61\")   Wt 53.4 kg (117 lb 12.8 oz)   BMI 22.26 kg/m²         Review of Systems   Constitutional: Negative.    HENT: Negative.    Eyes: Negative.    Respiratory: Negative.    Cardiovascular: Negative.    Gastrointestinal: Negative.    Endocrine: Negative.    Genitourinary: Negative.         Vaginal irritation   Musculoskeletal: Negative.    Skin: Negative.    Allergic/Immunologic: Negative.    Neurological: Negative.    Hematological: Negative.    Psychiatric/Behavioral: Negative.        Objective   Physical Exam   Constitutional: She is oriented to person, place, and time. She appears well-developed and well-nourished.   Genitourinary: There is no lesion on the right labia. There is no lesion on the left labia. No erythema or bleeding in the vagina. No foreign body in the vagina. No signs of injury around the vagina.   Genitourinary " Comments: Normal exam  No lesions or abnormal discharge   Neurological: She is alert and oriented to person, place, and time.   Psychiatric: She has a normal mood and affect. Her behavior is normal.         Assessment/Plan   Katia was seen today for follow-up.    Diagnoses and all orders for this visit:    Subacute vaginitis  -     NuSwab BV & Candida - Swab, Vagina    History of urinary tract infection  -     Urine Culture - Urine, Urine, Clean Catch          Recheck urine and vaginal cultures.  Patient will call with any recurrence of symptoms or concerns.

## 2019-03-07 ENCOUNTER — PROCEDURE VISIT (OUTPATIENT)
Dept: OBSTETRICS AND GYNECOLOGY | Age: 49
End: 2019-03-07

## 2019-03-07 ENCOUNTER — APPOINTMENT (OUTPATIENT)
Dept: WOMENS IMAGING | Facility: HOSPITAL | Age: 49
End: 2019-03-07

## 2019-03-07 DIAGNOSIS — Z12.31 VISIT FOR SCREENING MAMMOGRAM: Primary | ICD-10-CM

## 2019-03-07 LAB
A VAGINAE DNA VAG QL NAA+PROBE: NORMAL SCORE
BACTERIA UR CULT: NO GROWTH
BACTERIA UR CULT: NORMAL
BVAB2 DNA VAG QL NAA+PROBE: NORMAL SCORE
C ALBICANS DNA VAG QL NAA+PROBE: NEGATIVE
C GLABRATA DNA VAG QL NAA+PROBE: NEGATIVE
MEGA1 DNA VAG QL NAA+PROBE: NORMAL SCORE

## 2019-03-07 PROCEDURE — 77067 SCR MAMMO BI INCL CAD: CPT | Performed by: RADIOLOGY

## 2019-03-07 PROCEDURE — 77067 SCR MAMMO BI INCL CAD: CPT | Performed by: OBSTETRICS & GYNECOLOGY

## 2019-05-05 ENCOUNTER — APPOINTMENT (OUTPATIENT)
Dept: GENERAL RADIOLOGY | Facility: HOSPITAL | Age: 49
End: 2019-05-05

## 2019-05-05 ENCOUNTER — HOSPITAL ENCOUNTER (EMERGENCY)
Facility: HOSPITAL | Age: 49
Discharge: HOME | End: 2019-05-05
Attending: EMERGENCY MEDICINE | Admitting: EMERGENCY MEDICINE

## 2019-05-05 VITALS
SYSTOLIC BLOOD PRESSURE: 116 MMHG | OXYGEN SATURATION: 97 % | WEIGHT: 114 LBS | RESPIRATION RATE: 16 BRPM | BODY MASS INDEX: 21.52 KG/M2 | TEMPERATURE: 98 F | HEART RATE: 67 BPM | DIASTOLIC BLOOD PRESSURE: 80 MMHG | HEIGHT: 61 IN

## 2019-05-05 DIAGNOSIS — R07.89 MUSCULOSKELETAL CHEST PAIN: Primary | ICD-10-CM

## 2019-05-05 LAB
ALBUMIN SERPL-MCNC: 4.3 G/DL (ref 3.5–5.2)
ALBUMIN/GLOB SERPL: 1.6 G/DL
ALP SERPL-CCNC: 47 U/L (ref 39–117)
ALT SERPL W P-5'-P-CCNC: 15 U/L (ref 1–33)
ANION GAP SERPL CALCULATED.3IONS-SCNC: 8.7 MMOL/L
AST SERPL-CCNC: 16 U/L (ref 1–32)
BASOPHILS # BLD AUTO: 0.05 10*3/MM3 (ref 0–0.2)
BASOPHILS NFR BLD AUTO: 0.6 % (ref 0–1.5)
BILIRUB SERPL-MCNC: 0.2 MG/DL (ref 0.2–1.2)
BUN BLD-MCNC: 9 MG/DL (ref 6–20)
BUN/CREAT SERPL: 11.8 (ref 7–25)
CALCIUM SPEC-SCNC: 8.8 MG/DL (ref 8.6–10.5)
CHLORIDE SERPL-SCNC: 105 MMOL/L (ref 98–107)
CO2 SERPL-SCNC: 26.3 MMOL/L (ref 22–29)
CREAT BLD-MCNC: 0.76 MG/DL (ref 0.57–1)
D DIMER PPP FEU-MCNC: <0.27 MCGFEU/ML (ref 0–0.49)
DEPRECATED RDW RBC AUTO: 46.9 FL (ref 37–54)
EOSINOPHIL # BLD AUTO: 0.19 10*3/MM3 (ref 0–0.4)
EOSINOPHIL NFR BLD AUTO: 2.1 % (ref 0.3–6.2)
ERYTHROCYTE [DISTWIDTH] IN BLOOD BY AUTOMATED COUNT: 13.2 % (ref 12.3–15.4)
ERYTHROCYTE [SEDIMENTATION RATE] IN BLOOD: 4 MM/HR (ref 0–20)
GFR SERPL CREATININE-BSD FRML MDRD: 81 ML/MIN/1.73
GLOBULIN UR ELPH-MCNC: 2.7 GM/DL
GLUCOSE BLD-MCNC: 83 MG/DL (ref 65–99)
HCT VFR BLD AUTO: 44.2 % (ref 34–46.6)
HGB BLD-MCNC: 14.2 G/DL (ref 12–15.9)
IMM GRANULOCYTES # BLD AUTO: 0.02 10*3/MM3 (ref 0–0.05)
IMM GRANULOCYTES NFR BLD AUTO: 0.2 % (ref 0–0.5)
LYMPHOCYTES # BLD AUTO: 1.64 10*3/MM3 (ref 0.7–3.1)
LYMPHOCYTES NFR BLD AUTO: 18 % (ref 19.6–45.3)
MCH RBC QN AUTO: 30.9 PG (ref 26.6–33)
MCHC RBC AUTO-ENTMCNC: 32.1 G/DL (ref 31.5–35.7)
MCV RBC AUTO: 96.1 FL (ref 79–97)
MONOCYTES # BLD AUTO: 0.92 10*3/MM3 (ref 0.1–0.9)
MONOCYTES NFR BLD AUTO: 10.1 % (ref 5–12)
NEUTROPHILS # BLD AUTO: 6.27 10*3/MM3 (ref 1.7–7)
NEUTROPHILS NFR BLD AUTO: 69 % (ref 42.7–76)
NRBC BLD AUTO-RTO: 0 /100 WBC (ref 0–0.2)
NT-PROBNP SERPL-MCNC: 33.6 PG/ML (ref 5–450)
PLATELET # BLD AUTO: 488 10*3/MM3 (ref 140–450)
PMV BLD AUTO: 10.3 FL (ref 6–12)
POTASSIUM BLD-SCNC: 4.3 MMOL/L (ref 3.5–5.2)
PROT SERPL-MCNC: 7 G/DL (ref 6–8.5)
RBC # BLD AUTO: 4.6 10*6/MM3 (ref 3.77–5.28)
SODIUM BLD-SCNC: 140 MMOL/L (ref 136–145)
TROPONIN T SERPL-MCNC: <0.01 NG/ML (ref 0–0.03)
TROPONIN T SERPL-MCNC: <0.01 NG/ML (ref 0–0.03)
WBC NRBC COR # BLD: 9.09 10*3/MM3 (ref 3.4–10.8)

## 2019-05-05 PROCEDURE — 99284 EMERGENCY DEPT VISIT MOD MDM: CPT

## 2019-05-05 PROCEDURE — 80053 COMPREHEN METABOLIC PANEL: CPT | Performed by: EMERGENCY MEDICINE

## 2019-05-05 PROCEDURE — 93010 ELECTROCARDIOGRAM REPORT: CPT | Performed by: INTERNAL MEDICINE

## 2019-05-05 PROCEDURE — 85379 FIBRIN DEGRADATION QUANT: CPT | Performed by: EMERGENCY MEDICINE

## 2019-05-05 PROCEDURE — 71046 X-RAY EXAM CHEST 2 VIEWS: CPT

## 2019-05-05 PROCEDURE — 85652 RBC SED RATE AUTOMATED: CPT | Performed by: EMERGENCY MEDICINE

## 2019-05-05 PROCEDURE — 83880 ASSAY OF NATRIURETIC PEPTIDE: CPT | Performed by: EMERGENCY MEDICINE

## 2019-05-05 PROCEDURE — 93005 ELECTROCARDIOGRAM TRACING: CPT | Performed by: EMERGENCY MEDICINE

## 2019-05-05 PROCEDURE — 85025 COMPLETE CBC W/AUTO DIFF WBC: CPT | Performed by: EMERGENCY MEDICINE

## 2019-05-05 PROCEDURE — 84484 ASSAY OF TROPONIN QUANT: CPT | Performed by: EMERGENCY MEDICINE

## 2019-05-05 RX ORDER — LISDEXAMFETAMINE DIMESYLATE 50 MG
50 CAPSULE ORAL DAILY
Refills: 0 | COMMUNITY
Start: 2019-02-13

## 2019-05-05 RX ORDER — NAPROXEN 500 MG/1
500 TABLET ORAL 2 TIMES DAILY WITH MEALS
Qty: 14 TABLET | Refills: 0 | Status: SHIPPED | OUTPATIENT
Start: 2019-05-05 | End: 2019-09-04

## 2019-05-05 RX ORDER — SODIUM CHLORIDE 0.9 % (FLUSH) 0.9 %
10 SYRINGE (ML) INJECTION AS NEEDED
Status: DISCONTINUED | OUTPATIENT
Start: 2019-05-05 | End: 2019-05-05 | Stop reason: HOSPADM

## 2019-05-05 NOTE — ED NOTES
"Report received from SUMAYA Wilburn at [facility]. She states \"The pt has been having chest pain X 4 days, she has no cardiac history other than a benign murmur. Her EKG is normal and HR is 72. I'm giving her 324mg aspirin before she leaves.\"     Mary Astorga, RN  05/05/19 1044    "

## 2019-05-05 NOTE — ED NOTES
"Pt states shooting, sharp chest pain that started 4 days ago. Pain worsens when laying down to sleep. Pain is now \"achy and won't go away.\"      Karissa Mederos RN  05/05/19 0495    "

## 2019-05-05 NOTE — ED PROVIDER NOTES
" EMERGENCY DEPARTMENT ENCOUNTER    CHIEF COMPLAINT  Chief Complaint: Chest pain  History given by: Pt  History limited by: Nothing  Room Number: 12/12  PMD: Rene Rivera MD      HPI:  Pt is a 48 y.o. female who presents complaining of intermittent \"shooting\" and \"sharp\" left chest pain for the last few days that since last night has been \"squeezing.\" She states that last night her pain radiated into her L shoulder. She reports her pain is worse with laying flat. Pt also c/o intermittent dizziness for the last 2-3 weeks. She denies SOA, nausea, or diaphoresis. She reports a strong family hx of heart disease, but denies a personal hx of heart disease or HTN. She states she had a normal stress test 3-4 years ago that was unremarkable. She reports she had 324 mg of ASA PTA which improved her pain.      Duration:  A few days  Onset: gradual  Timing: intermittent  Location: left chest  Radiation: into left shoulder  Quality:  \"shooting\" and \"sharp\" for the last few days, \"squeezing\" pain since last night  Intensity/Severity: moderate  Progression: worsened last night  Associated Symptoms: intermittent dizziness  Aggravating Factors: laying flat  Alleviating Factors: none  Previous Episodes: Pt reports a strong family hx of heart disease  Treatment before arrival: She had a normal stress test 3-4 years ago    PAST MEDICAL HISTORY  Active Ambulatory Problems     Diagnosis Date Noted   • Hemorrhoids 05/02/2012   • Hypothyroidism    • Stress disorder, acute 11/09/2015   • ADD (attention deficit disorder) 09/08/2016   • Postmenopausal HRT (hormone replacement therapy) 09/08/2016   • Radiculopathy of leg 05/23/2018     Resolved Ambulatory Problems     Diagnosis Date Noted   • No Resolved Ambulatory Problems     Past Medical History:   Diagnosis Date   • Allergic    • Breast pain, left    • Elevated cholesterol    • Hemorrhoids 05/02/2012   • Hypothyroidism    • Murmur    • Vaginal atrophy        PAST SURGICAL HISTORY  Past " Surgical History:   Procedure Laterality Date   • BACK SURGERY      L4, L5 discectomy   • BREAST SURGERY     • CHOLECYSTECTOMY     • ENDOSCOPY  01/2013   • GALLBLADDER SURGERY  2016   • HEMORRHOIDECTOMY     • HYSTERECTOMY  2008   • LASIK  1998   • TONSILLECTOMY         FAMILY HISTORY  Family History   Problem Relation Age of Onset   • Heart disease Mother    • Heart disease Father    • Diabetes Paternal Aunt    • Stroke Maternal Grandmother    • Cancer Paternal Grandfather        SOCIAL HISTORY  Social History     Socioeconomic History   • Marital status:      Spouse name: Not on file   • Number of children: Not on file   • Years of education: Not on file   • Highest education level: Not on file   Tobacco Use   • Smoking status: Never Smoker   • Smokeless tobacco: Never Used   Substance and Sexual Activity   • Alcohol use: Yes     Comment: current some day; occ   • Drug use: No   • Sexual activity: Yes     Partners: Male     Birth control/protection: Surgical       ALLERGIES  Codeine and Erythromycin    REVIEW OF SYSTEMS  Review of Systems   Constitutional: Negative for fever.   HENT: Negative for sore throat.    Eyes: Negative.    Respiratory: Negative for cough and shortness of breath.    Cardiovascular: Positive for chest pain.   Gastrointestinal: Negative for abdominal pain, diarrhea and vomiting.   Genitourinary: Negative for dysuria.   Musculoskeletal: Negative for neck pain.   Skin: Negative for rash.   Neurological: Positive for dizziness. Negative for weakness, numbness and headaches.   Hematological: Negative.    Psychiatric/Behavioral: Negative.    All other systems reviewed and are negative.      PHYSICAL EXAM  ED Triage Vitals   Temp Heart Rate Resp BP SpO2   05/05/19 1054 05/05/19 1054 05/05/19 1054 05/05/19 1100 05/05/19 1054   98 °F (36.7 °C) 76 16 (!) 132/101 100 %      Temp src Heart Rate Source Patient Position BP Location FiO2 (%)   05/05/19 1054 -- -- -- --   Tympanic           Physical  Exam   Constitutional: She is oriented to person, place, and time. No distress.   HENT:   Head: Normocephalic and atraumatic.   Mouth/Throat: Oropharynx is clear and moist.   Eyes: EOM are normal. Pupils are equal, round, and reactive to light.   Neck: Normal range of motion. Neck supple.   Cardiovascular: Normal rate and regular rhythm.   Murmur (at the lower sternal border) heard.   Systolic (ejection) murmur is present with a grade of 1/6.  Pulmonary/Chest: Effort normal and breath sounds normal. No respiratory distress.   Abdominal: Soft. There is no tenderness. There is no rebound and no guarding.   Musculoskeletal: Normal range of motion. She exhibits no edema (pedal) or tenderness (calf).   Neurological: She is alert and oriented to person, place, and time. She has normal sensation and normal strength.   Skin: Skin is warm and dry. No rash noted.   Psychiatric: Mood and affect normal.   Nursing note and vitals reviewed.      LAB RESULTS  Lab Results (last 24 hours)     Procedure Component Value Units Date/Time    CBC & Differential [[id number]] Collected:  05/05/19 1114    Specimen:  Blood Updated:  05/05/19 1130    Narrative:       The following orders were created for panel order CBC & Differential.  Procedure                               Abnormality         Status                     ---------                               -----------         ------                     CBC Auto Differential[[id number]]        Abnormal            Final result                 Please view results for these tests on the individual orders.    Comprehensive Metabolic Panel [[id number]] Collected:  05/05/19 1114    Specimen:  Blood Updated:  05/05/19 1150     Glucose 83 mg/dL      BUN 9 mg/dL      Creatinine 0.76 mg/dL      Sodium 140 mmol/L      Potassium 4.3 mmol/L      Chloride 105 mmol/L      CO2 26.3 mmol/L      Calcium 8.8 mg/dL      Total Protein 7.0 g/dL      Albumin 4.30 g/dL      ALT (SGPT) 15 U/L      AST (SGOT) 16 U/L       Alkaline Phosphatase 47 U/L      Total Bilirubin 0.2 mg/dL      eGFR Non African Amer 81 mL/min/1.73      Globulin 2.7 gm/dL      A/G Ratio 1.6 g/dL      BUN/Creatinine Ratio 11.8     Anion Gap 8.7 mmol/L     Narrative:       GFR Normal >60  Chronic Kidney Disease <60  Kidney Failure <15    BNP [[id number]]  (Normal) Collected:  05/05/19 1114    Specimen:  Blood Updated:  05/05/19 1153     proBNP 33.6 pg/mL     Narrative:       Among patients with dyspnea, NT-proBNP is highly sensitive for the detection of acute congestive heart failure. In addition NT-proBNP of <300 pg/ml effectively rules out acute congestive heart failure with 99% negative predictive value.    D-dimer, Quantitative [[id number]]  (Normal) Collected:  05/05/19 1114    Specimen:  Blood Updated:  05/05/19 1213     D-Dimer, Quantitative <0.27 MCGFEU/mL     Narrative:       The Stago D-Dimer test used in conjunction with a clinical pretest probability (PTP) assessment model, has been approved by the FDA to rule out the presence of venous thromboembolism (VTE) in outpatients suspected of deep venous thrombosis (DVT) or pulmonary embolism (PE). The cut-off for negative predictive value is <0.50 MCGFEU/mL.    Troponin [[id number]]  (Normal) Collected:  05/05/19 1114    Specimen:  Blood Updated:  05/05/19 1153     Troponin T <0.010 ng/mL     Narrative:       Troponin T Reference Range:  <= 0.03 ng/mL-   Negative for AMI  >0.03 ng/mL-     Abnormal for myocardial necrosis.  Clinicians would have to utilize clinical acumen, EKG, Troponin and serial changes to determine if it is an Acute Myocardial Infarction or myocardial injury due to an underlying chronic condition.     Sedimentation Rate [[id number]]  (Normal) Collected:  05/05/19 1114    Specimen:  Blood Updated:  05/05/19 1233     Sed Rate 4 mm/hr     CBC Auto Differential [[id number]]  (Abnormal) Collected:  05/05/19 1114    Specimen:  Blood Updated:  05/05/19 1130     WBC 9.09 10*3/mm3      RBC 4.60  10*6/mm3      Hemoglobin 14.2 g/dL      Hematocrit 44.2 %      MCV 96.1 fL      MCH 30.9 pg      MCHC 32.1 g/dL      RDW 13.2 %      RDW-SD 46.9 fl      MPV 10.3 fL      Platelets 488 10*3/mm3      Neutrophil % 69.0 %      Lymphocyte % 18.0 %      Monocyte % 10.1 %      Eosinophil % 2.1 %      Basophil % 0.6 %      Immature Grans % 0.2 %      Neutrophils, Absolute 6.27 10*3/mm3      Lymphocytes, Absolute 1.64 10*3/mm3      Monocytes, Absolute 0.92 10*3/mm3      Eosinophils, Absolute 0.19 10*3/mm3      Basophils, Absolute 0.05 10*3/mm3      Immature Grans, Absolute 0.02 10*3/mm3      nRBC 0.0 /100 WBC     Troponin [[id number]]  (Normal) Collected:  05/05/19 1334    Specimen:  Blood Updated:  05/05/19 1405     Troponin T <0.010 ng/mL     Narrative:       Troponin T Reference Range:  <= 0.03 ng/mL-   Negative for AMI  >0.03 ng/mL-     Abnormal for myocardial necrosis.  Clinicians would have to utilize clinical acumen, EKG, Troponin and serial changes to determine if it is an Acute Myocardial Infarction or myocardial injury due to an underlying chronic condition.           I ordered the above labs and reviewed the results    RADIOLOGY  XR Chest 2 View   Preliminary Result   There is no evidence for an active or acute cardiopulmonary   process.               I ordered the above noted radiological studies. Interpreted by radiologist. Reviewed by me in PACS.       PROCEDURES  Procedures    EKG    EKG time: 1058  Rhythm/Rate: nsr, 69  No Acute Ischemia  Non-Specific ST-T changes    unchanged compared to prior on 2013    Interpreted Contemporaneously by me.  Independently viewed by me    HEART SCORE    History Slightly or non-suspicious (0)  ECG Normal (0)  Age 46-65 (1)  Risk factors 1 or 2 (1)  Troponin < or = Normal limit (0)    This patient's HEART score is 2    HEART Score Key:  Scores 0-3: 0.9-1.7% risk of adverse cardiac event. In the HEART Score study, these patients were discharged (0.99% in the retrospective study,  1.7% in the prospective study)  Scores 4-6: 12-16.6% risk of adverse cardiac event. In the HEART Score study, these patients were admitted to the hospital. (11.6% retrospective, 16.6% prospective)  Scores ?7: 50-65% risk of adverse cardiac event. In the HEART Score study, these patients were candidates for early invasive measures. (65.2% retrospective, 50.1% prospective)        PROGRESS AND CONSULTS       1110 - Lab work and CXR ordered for further evaluation.     1257 - Rechecked pt. Pt is resting comfortably in NAD. She reports her pain is improved. D/w pt the results of her lab work which was unremarkable including her negative troponin. Stated the plan to acquire a repeat troponin.     1304 - Repeat troponin ordered for further evaluation.     1407 - Rechecked pt. Pt is resting comfortably in NAD. She states her chest pain has resolved. She states she has been lifting heavy bags at work, but denies any recent trauma. Informed her of the results of her repeat troponin. Stated the plan to discharge home with anti-inflammatories and a follow up with PCP. Pt understands and agrees with plan. All questions answered     MEDICAL DECISION MAKING  Results were reviewed/discussed with the patient and they were also made aware of online access. Pt also made aware that some labs, such as cultures, will not be resulted during ER visit and follow up with PMD is necessary.     MDM  Number of Diagnoses or Management Options     Amount and/or Complexity of Data Reviewed  Clinical lab tests: reviewed and ordered (First and second troponin - negative  D-dimer - negative)  Tests in the radiology section of CPT®: ordered and reviewed (CXR - negative acute)  Tests in the medicine section of CPT®: reviewed and ordered (See EKG procedure note.)  Obtain history from someone other than the patient: yes ()  Independent visualization of images, tracings, or specimens: yes           DIAGNOSIS  Final diagnoses:   Musculoskeletal chest  pain       DISPOSITION  DISCHARGE    Patient discharged in stable condition.    Reviewed implications of results, diagnosis, meds, responsibility to follow up, warning signs and symptoms of possible worsening, potential complications and reasons to return to ER.    Patient/Family voiced understanding of above instructions.    Discussed plan for discharge, as there is no emergent indication for admission. Patient referred to primary care provider for BP management due to today's BP. Pt/family is agreeable and understands need for follow up and repeat testing.  Pt is aware that discharge does not mean that nothing is wrong but it indicates no emergency is present that requires admission and they must continue care with follow-up as given below or physician of their choice.     FOLLOW-UP  Rene Rivera MD  [id number] [id number] [phone]    In 3 days  For recheck         Medication List      New Prescriptions    naproxen 500 MG EC tablet  Commonly known as:  EC NAPROSYN  Take 1 tablet by mouth 2 (Two) Times a Day With Meals.        Stop    Naproxen Sodium 220 MG capsule     ZORVOLEX 35 MG capsule  Generic drug:  Diclofenac          Latest Documented Vital Signs:  As of 2:13 PM  BP- 107/82 HR- 71 Temp- 98 °F (36.7 °C) (Tympanic) O2 sat- 97%    --  Documentation assistance provided by lurdes Lugo for Dr. Antoine.  Information recorded by the scribe was done at my direction and has been verified and validated by me.     Shakir Lugo  05/05/19 2593       Jelani Antoine MD  05/05/19 0809

## 2019-05-16 ENCOUNTER — TELEPHONE (OUTPATIENT)
Dept: OBSTETRICS AND GYNECOLOGY | Age: 49
End: 2019-05-16

## 2019-05-16 NOTE — TELEPHONE ENCOUNTER
Dr Kaur pt would like to speak to Lisa regarding questions for an infection, pt would like to know if she may be seen today. Please Advise

## 2019-05-16 NOTE — TELEPHONE ENCOUNTER
Dr Kaur is gone for the day.He is in surgery. She needs to leave a message and possibly see the NP.

## 2019-05-17 ENCOUNTER — OFFICE VISIT (OUTPATIENT)
Dept: OBSTETRICS AND GYNECOLOGY | Age: 49
End: 2019-05-17

## 2019-05-17 VITALS
SYSTOLIC BLOOD PRESSURE: 120 MMHG | DIASTOLIC BLOOD PRESSURE: 88 MMHG | WEIGHT: 113 LBS | HEIGHT: 61 IN | BODY MASS INDEX: 21.34 KG/M2

## 2019-05-17 DIAGNOSIS — N76.3 SUBACUTE VULVITIS: ICD-10-CM

## 2019-05-17 DIAGNOSIS — N76.2 ACUTE VULVITIS: Primary | ICD-10-CM

## 2019-05-17 DIAGNOSIS — Z90.710 HISTORY OF TOTAL VAGINAL HYSTERECTOMY (TVH): ICD-10-CM

## 2019-05-17 PROCEDURE — 99213 OFFICE O/P EST LOW 20 MIN: CPT | Performed by: OBSTETRICS & GYNECOLOGY

## 2019-05-17 RX ORDER — CLOTRIMAZOLE AND BETAMETHASONE DIPROPIONATE 10; .64 MG/G; MG/G
CREAM TOPICAL
Qty: 45 G | Refills: 6 | Status: SHIPPED | OUTPATIENT
Start: 2019-05-17 | End: 2019-12-13 | Stop reason: HOSPADM

## 2019-05-17 RX ORDER — METFORMIN HYDROCHLORIDE 500 MG/1
500 TABLET, EXTENDED RELEASE ORAL EVERY MORNING
Refills: 0 | COMMUNITY
Start: 2019-03-10 | End: 2020-08-06

## 2019-05-17 NOTE — PROGRESS NOTES
"Subjective     Chief Complaint   Patient presents with   • Gynecologic Exam     POSSIBLE YEAST INF.       History of Present Illness  Katia Puente is a 48 y.o. female is being seen today for evaluation of possible vaginal infection.  Patient is taking hormones from a \"hormone specialist.  She was treated with Diflucan but still has some irritation with intercourse.  Incidentally, she was seen in the emergency room for possible cardiac concerns but diagnosed with a pulled chest wall muscle.  Patient has a history of acute stress disorder.  She has had a vaginal hysterectomy.  She does not have any unusual discharge or odor.  She has been afebrile  Chief Complaint   Patient presents with   • Gynecologic Exam     POSSIBLE YEAST INF.   .        The following portions of the patient's history were reviewed and updated as appropriate: allergies, current medications, past family history, past medical history, past social history, past surgical history and problem list.    PAST MEDICAL HISTORY  Past Medical History:   Diagnosis Date   • Allergic    • Breast pain, left    • Elevated cholesterol    • Hemorrhoids 2012   • Hypothyroidism    • Murmur    • Vaginal atrophy      OB History    Para Term  AB Living   5 2 2   3     SAB TAB Ectopic Molar Multiple Live Births   3                # Outcome Date GA Lbr Richie/2nd Weight Sex Delivery Anes PTL Lv   5 SAB            4 SAB            3 SAB            2 Term            1 Term                 Past Surgical History:   Procedure Laterality Date   • BACK SURGERY      L4, L5 discectomy   • BREAST SURGERY     • CHOLECYSTECTOMY     • ENDOSCOPY  2013   • GALLBLADDER SURGERY     • HEMORRHOIDECTOMY     • HYSTERECTOMY     • LASIK     • TONSILLECTOMY       Family History   Problem Relation Age of Onset   • Heart disease Mother    • Heart disease Father    • Diabetes Paternal Aunt    • Stroke Maternal Grandmother    • Cancer Paternal Grandfather  "     Social History     Tobacco Use   Smoking Status Never Smoker   Smokeless Tobacco Never Used       Current Outpatient Medications:   •  ALPRAZolam (XANAX) 0.5 MG tablet, , Disp: , Rfl:   •  ARMOUR THYROID 15 MG tablet, , Disp: , Rfl:   •  ARMOUR THYROID 60 MG PO tablet, 60 mg., Disp: , Rfl:   •  cetirizine (zyrTEC) 5 MG tablet, Take 5 mg by mouth., Disp: , Rfl:   •  Fish Oil-Cholecalciferol (FISH OIL + D3 PO), Take  by mouth., Disp: , Rfl:   •  IRON PO, Take  by mouth., Disp: , Rfl:   •  metFORMIN ER (GLUCOPHAGE-XR) 500 MG 24 hr tablet, Take 500 mg by mouth Every Morning., Disp: , Rfl: 0  •  naproxen (EC NAPROSYN) 500 MG EC tablet, Take 1 tablet by mouth 2 (Two) Times a Day With Meals., Disp: 14 tablet, Rfl: 0  •  nystatin (MYCOSTATIN) 263214 UNIT/GM ointment, 1 APPLICATION TO AFFECTED SKIN TOPICALLY 3 TIMES PER DAY FOR 7 DAY(S), Disp: , Rfl: 0  •  Ped Multivitamins-Fl-Iron (MULTIVITAMIN WITH FLUORIDE/IRON) 0.25-10 MG/ML solution solution, Take  by mouth daily., Disp: , Rfl:   •  progesterone (PROMETRIUM) 200 MG capsule, Take 200 mg by mouth Daily., Disp: , Rfl:   •  VYVANSE 50 MG capsule, Take 50 mg by mouth Daily, Disp: , Rfl: 0  •  clotrimazole-betamethasone (LOTRISONE) 1-0.05 % cream, Apply to affected area twice daily 7 days, then 3 times a week, Disp: 45 g, Rfl: 6    There is no immunization history on file for this patient.    Review of Systems       Except as outlined in history of physical illness, patient denies any changes in her GYN, , GI systems. All other systems reviewed are negative.    Objective   Physical Exam   Alert and oriented, respirations unlabored, heart regular rate and rhythm   Pelvic external genitalia show some thickening and erythema of the labia majora and skin around the mons pubis.  Vagina is adequately estrogenized and well supported.  Cervix uterus are absent adnexa not enlarged nontender      Assessment/Plan   Katia was seen today for gynecologic exam.    Diagnoses and all  orders for this visi    History of total vaginal hysterectomy (TVH)    Subacute vulvitis  No evidence of yeast or vaginal infection but will send off vaginal swab and await those results.  Unsure if this is a lingering candidal vulvar infection or the beginning of a dermatological change such as lichen sclerosis.  We will treat with some Lotrisone.  Also discussed better forms of vaginal lubricants with intercourse.  Other orders  -     clotrimazole-betamethasone (LOTRISONE) 1-0.05 % cream; Apply to affected area twice daily 7 days, then 3 times a week                   This encounter/visit was over 20 minutes.  Greater than 50% of that time was spent with the patient, examining and discussing her symptoms and explaining care.  Some of the items discussed included causes of vulvitis and discussion of estrogen on vaginal mucosa        EMR Dragon/ Transcription disclaimer:  Much of the encounter note is an electronic transcription/translation of spoken language to printed text. The electronic translation of spoken language may permit erroneous, or at times, nonessential words or phrases to be inadvertently transcribes; Although i have reviewed the note for such errors, some may still exist.

## 2019-05-20 LAB
A VAGINAE DNA VAG QL NAA+PROBE: NORMAL SCORE
BVAB2 DNA VAG QL NAA+PROBE: NORMAL SCORE
C ALBICANS DNA VAG QL NAA+PROBE: NEGATIVE
C GLABRATA DNA VAG QL NAA+PROBE: NEGATIVE
C TRACH RRNA SPEC QL NAA+PROBE: NEGATIVE
MEGA1 DNA VAG QL NAA+PROBE: NORMAL SCORE
N GONORRHOEA RRNA SPEC QL NAA+PROBE: NEGATIVE
T VAGINALIS RRNA SPEC QL NAA+PROBE: NEGATIVE

## 2019-05-21 ENCOUNTER — TELEPHONE (OUTPATIENT)
Dept: OBSTETRICS AND GYNECOLOGY | Age: 49
End: 2019-05-21

## 2019-05-21 NOTE — TELEPHONE ENCOUNTER
----- Message from Rene Kaur MD sent at 5/20/2019  5:55 PM EDT -----  Please notify pt. That labs are with in normal limits

## 2019-09-04 ENCOUNTER — OFFICE VISIT (OUTPATIENT)
Dept: FAMILY MEDICINE CLINIC | Facility: CLINIC | Age: 49
End: 2019-09-04

## 2019-09-04 VITALS
DIASTOLIC BLOOD PRESSURE: 81 MMHG | HEART RATE: 86 BPM | HEIGHT: 61 IN | RESPIRATION RATE: 16 BRPM | SYSTOLIC BLOOD PRESSURE: 130 MMHG | TEMPERATURE: 98.2 F | WEIGHT: 113 LBS | BODY MASS INDEX: 21.34 KG/M2

## 2019-09-04 DIAGNOSIS — M54.16 LUMBAR BACK PAIN WITH RADICULOPATHY AFFECTING RIGHT LOWER EXTREMITY: Primary | ICD-10-CM

## 2019-09-04 PROCEDURE — 99214 OFFICE O/P EST MOD 30 MIN: CPT | Performed by: FAMILY MEDICINE

## 2019-09-04 RX ORDER — TRAMADOL HYDROCHLORIDE 50 MG/1
50 TABLET ORAL EVERY 6 HOURS PRN
COMMUNITY
End: 2019-09-04 | Stop reason: SDUPTHER

## 2019-09-04 RX ORDER — CYCLOBENZAPRINE HCL 10 MG
TABLET ORAL
Refills: 0 | COMMUNITY
Start: 2019-08-11 | End: 2020-08-06

## 2019-09-04 RX ORDER — PREDNISONE 20 MG/1
TABLET ORAL
Qty: 11 TABLET | Refills: 0 | Status: SHIPPED | OUTPATIENT
Start: 2019-09-04 | End: 2019-10-03

## 2019-09-04 RX ORDER — CELECOXIB 200 MG/1
200 CAPSULE ORAL DAILY
Qty: 30 CAPSULE | Refills: 5 | Status: SHIPPED | OUTPATIENT
Start: 2019-09-04 | End: 2020-08-06

## 2019-09-04 RX ORDER — TRAMADOL HYDROCHLORIDE 50 MG/1
50 TABLET ORAL EVERY 6 HOURS PRN
Qty: 30 TABLET | Refills: 0 | Status: SHIPPED | OUTPATIENT
Start: 2019-09-04 | End: 2020-08-06

## 2019-09-04 RX ORDER — FLUCONAZOLE 150 MG/1
150 TABLET ORAL ONCE
Qty: 1 TABLET | Refills: 5 | Status: SHIPPED | OUTPATIENT
Start: 2019-09-04 | End: 2019-09-04

## 2019-09-04 NOTE — PROGRESS NOTES
"Subjective   Katia Puente is a 49 y.o. female.     CC: Back Pain    History of Present Illness     Pt with known Lumbar DDD s/p surgery last year comes in today with a 6 week flare-up of her lower back pain. No recent injury or known reason this would be flared up. Is a slight bit better with yoga and inversion table but still having to take some occasional Flexeril, Ibuprofen and some old Tramadol for relief. This all started in the middle of a cruise (sleeping on new bed, etc) and moving some suitcases, although  did most of the luggage. Her prior neurosurgeon in [place] is gone now and had seen Dr. Yu (here) but he is now gone, too.  Pt reports the pain shoots down the right LE to the great toe, in a dermatomal fashion. Has seen Dr Rojas (pain management) for injections prior.      The following portions of the patient's history were reviewed and updated as appropriate: allergies, current medications, past family history, past medical history, past social history, past surgical history and problem list.    Review of Systems   Constitutional: Negative for activity change, chills, fatigue and fever.   Respiratory: Negative for cough and chest tightness.    Cardiovascular: Negative for chest pain and palpitations.   Gastrointestinal: Negative for abdominal pain and nausea.   Endocrine: Negative for cold intolerance.   Musculoskeletal: Positive for back pain.   Neurological: Negative for weakness.   Psychiatric/Behavioral: Negative for behavioral problems and dysphoric mood.       /81   Pulse 86   Temp 98.2 °F (36.8 °C) (Oral)   Resp 16   Ht 154.9 cm (61\")   Wt 51.3 kg (113 lb)   BMI 21.35 kg/m²     Objective   Physical Exam   Constitutional: She appears well-developed and well-nourished.   Neck: Neck supple. No thyromegaly present.   Cardiovascular: Normal rate and regular rhythm.   No murmur heard.  Pulmonary/Chest: Effort normal and breath sounds normal.   Abdominal: Bowel sounds are " normal. There is no tenderness.   Musculoskeletal: She exhibits tenderness (Tender midline lower lumbar as well as right SI joint region.).   Mildly positive SLR right   Neurological: She is alert.   Reflex Scores:       Patellar reflexes are 2+ on the right side and 2+ on the left side.       Achilles reflexes are 2+ on the right side and 2+ on the left side.  Psychiatric: She has a normal mood and affect. Her behavior is normal.   Nursing note and vitals reviewed.  Prior MRI report from 5/18 reviewed with today's visit.   Luisa present for exam.    Assessment/Plan   Katia was seen today for right lower back pain.    Diagnoses and all orders for this visit:    Lumbar back pain with radiculopathy affecting right lower extremity  -     MRI Lumbar Spine Without Contrast; Future  -     predniSONE (DELTASONE) 20 MG tablet; Take 1 tab BID x 4 days then 1 tab QD x 3 days  -     celecoxib (CELEBREX) 200 MG capsule; Take 1 capsule by mouth Daily.  -     traMADol (ULTRAM) 50 MG tablet; Take 1 tablet by mouth Every 6 (Six) Hours As Needed for Moderate Pain .  -     Ambulatory Referral to Physical Therapy    Other orders  -     fluconazole (DIFLUCAN) 150 MG tablet; Take 1 tablet by mouth 1 (One) Time for 1 dose.

## 2019-09-17 ENCOUNTER — APPOINTMENT (OUTPATIENT)
Dept: MRI IMAGING | Facility: HOSPITAL | Age: 49
End: 2019-09-17

## 2019-09-24 ENCOUNTER — OFFICE VISIT (OUTPATIENT)
Dept: FAMILY MEDICINE CLINIC | Facility: CLINIC | Age: 49
End: 2019-09-24

## 2019-09-24 VITALS
RESPIRATION RATE: 16 BRPM | DIASTOLIC BLOOD PRESSURE: 75 MMHG | SYSTOLIC BLOOD PRESSURE: 134 MMHG | HEIGHT: 61 IN | TEMPERATURE: 98.6 F | WEIGHT: 113 LBS | BODY MASS INDEX: 21.34 KG/M2 | HEART RATE: 88 BPM

## 2019-09-24 DIAGNOSIS — M54.16 LUMBAR BACK PAIN WITH RADICULOPATHY AFFECTING RIGHT LOWER EXTREMITY: Primary | ICD-10-CM

## 2019-09-24 PROCEDURE — 99213 OFFICE O/P EST LOW 20 MIN: CPT | Performed by: FAMILY MEDICINE

## 2019-09-24 RX ORDER — GABAPENTIN 300 MG/1
TABLET, FILM COATED ORAL
Refills: 0 | COMMUNITY
Start: 2019-09-18 | End: 2020-08-06

## 2019-09-24 NOTE — PROGRESS NOTES
"Subjective   Katia Puente is a 49 y.o. female.     CC: Management of Lumbar DDD    History of Present Illness     Pt with know Lumbar DDD s/p prior disc surgery returns today after seeing me 9/4 for a flare up. Was treated with steroids, NSAIDS, and Tramadol, as well as PT with Dry Needling and, while some better on the Prednisone, the sx rapidly returned once no longer on them. Pt's prior neurosurgeon was in [place] and is no longer in town (gone).   Pt saw an Ortho Spine MD (Dr Serafin Conley) yesterday and he thinks she needs an S1 Discectomy to fix her issue, although he is concerned about her Scoliosis issues. He also recommended she continue with the PT she's doing. Is getting Epidural injections with Dr Thomas.    The following portions of the patient's history were reviewed and updated as appropriate: allergies, current medications, past family history, past medical history, past social history, past surgical history and problem list.    Review of Systems   Constitutional: Negative for activity change, chills, fatigue and fever.   Respiratory: Negative for cough and chest tightness.    Cardiovascular: Negative for chest pain and palpitations.   Gastrointestinal: Negative for abdominal pain and nausea.   Endocrine: Negative for cold intolerance.   Musculoskeletal: Positive for back pain.   Psychiatric/Behavioral: Negative for behavioral problems and dysphoric mood.       /75   Pulse 88   Temp 98.6 °F (37 °C) (Oral)   Resp 16   Ht 154.9 cm (61\")   Wt 51.3 kg (113 lb)   BMI 21.35 kg/m²     Objective   Physical Exam   Constitutional: She appears well-developed and well-nourished.   Neck: Neck supple. No thyromegaly present.   Cardiovascular: Normal rate and regular rhythm.   No murmur heard.  Pulmonary/Chest: Effort normal and breath sounds normal.   Abdominal: Bowel sounds are normal. There is no tenderness.   Neurological: She is alert.   Psychiatric: She has a normal mood and affect. Her " behavior is normal.   Nursing note and vitals reviewed.      Assessment/Plan   Katia was seen today for lumbar back pain.    Diagnoses and all orders for this visit:    Lumbar back pain with radiculopathy affecting right lower extremity  -     Ambulatory Referral to Neurosurgery

## 2019-10-02 ENCOUNTER — TRANSCRIBE ORDERS (OUTPATIENT)
Dept: ADMINISTRATIVE | Facility: HOSPITAL | Age: 49
End: 2019-10-02

## 2019-10-02 DIAGNOSIS — M54.50 LOW BACK PAIN, UNSPECIFIED BACK PAIN LATERALITY, UNSPECIFIED CHRONICITY, UNSPECIFIED WHETHER SCIATICA PRESENT: Primary | ICD-10-CM

## 2019-10-02 NOTE — PROGRESS NOTES
Subjective   Patient ID: Katia Puente is a 49 y.o. female is here today for follow-up. She is a previous Dr. Yu patient last seen 5/23/2018 for back pain. After seeing Dr. Yu she had surgery in [place] 6/2018 L4-5 discectomy. That doctor is no longer practicing.  Today she is being seen for back pain that radiates into her right buttock and down her leg. She currently takes Mobic, Flexeril and Gralise.    History of Present Illness    This patient has been having pain in her back with radiation into her right leg for a couple of months now.  She had problems about a year and a half ago and ultimately had surgery in [place] in June 2018.  This supposedly was at L4-5 on the left side.  She did very well after that surgery but has been having pain again.  This time is in the right side of her back and radiates down her right leg.  It goes in the lateral thigh lateral calf and into the top of the right foot.  She does not have much trouble with her left leg at all.  She has no difficulty with bowel or bladder control or other associated symptoms.  Any kind of activity makes the pain worse and it is particularly bad in the morning when she gets up.  She has been treated with 2 caudal injections which have helped but she still has pretty severe pain.  Her previous surgeon has retired.  She did see Dr. Cardona who recommended a lumbar fusion.    The following portions of the patient's history were reviewed and updated as appropriate: allergies, current medications, past family history, past medical history, past social history, past surgical history and problem list.    Review of Systems   Constitutional: Positive for activity change.   Respiratory: Negative for chest tightness and shortness of breath.    Cardiovascular: Negative for chest pain.   Musculoskeletal: Positive for back pain and myalgias.   Neurological: Positive for numbness.   All other systems reviewed and are negative.      Objective   Physical  Exam   Constitutional: She is oriented to person, place, and time. She appears well-developed and well-nourished.   HENT:   Head: Normocephalic and atraumatic.   Eyes: Conjunctivae and EOM are normal. Pupils are equal, round, and reactive to light.   Fundoscopic exam:       The right eye shows no papilledema. The right eye shows venous pulsations.        The left eye shows no papilledema. The left eye shows venous pulsations.   Neck: Carotid bruit is not present.   Neurological: She is oriented to person, place, and time. She has a normal Finger-Nose-Finger Test and a normal Heel to Shin Test. Gait normal.   Reflex Scores:       Tricep reflexes are 2+ on the right side and 2+ on the left side.       Bicep reflexes are 2+ on the right side and 2+ on the left side.       Brachioradialis reflexes are 2+ on the right side and 2+ on the left side.       Patellar reflexes are 2+ on the right side and 2+ on the left side.       Achilles reflexes are 2+ on the right side and 2+ on the left side.  Psychiatric: Her speech is normal.     Neurologic Exam     Mental Status   Oriented to person, place, and time.   Registration of memory: Good recent and remote memory.   Attention: normal. Concentration: normal.   Speech: speech is normal   Level of consciousness: alert  Knowledge: consistent with education.     Cranial Nerves     CN II   Visual fields full to confrontation.   Visual acuity: normal    CN III, IV, VI   Pupils are equal, round, and reactive to light.  Extraocular motions are normal.     CN V   Facial sensation intact.   Right corneal reflex: normal  Left corneal reflex: normal    CN VII   Facial expression full, symmetric.   Right facial weakness: none  Left facial weakness: none    CN VIII   Hearing: intact    CN IX, X   Palate: symmetric    CN XI   Right sternocleidomastoid strength: normal  Left sternocleidomastoid strength: normal    CN XII   Tongue: not atrophic  Tongue deviation: none    Motor Exam   Muscle  bulk: normal  Right arm tone: normal  Left arm tone: normal  Right leg tone: normal  Left leg tone: normal    Strength   Strength 5/5 except as noted.     Sensory Exam   Light touch normal.     Gait, Coordination, and Reflexes     Gait  Gait: normal    Coordination   Finger to nose coordination: normal  Heel to shin coordination: normal    Reflexes   Right brachioradialis: 2+  Left brachioradialis: 2+  Right biceps: 2+  Left biceps: 2+  Right triceps: 2+  Left triceps: 2+  Right patellar: 2+  Left patellar: 2+  Right achilles: 2+  Left achilles: 2+  Right : 2+  Left : 2+      Assessment/Plan   Independent Review of Radiographic Studies:      I reviewed an MRI done at [facility] on September 12 of this year.  This shows some lumbar scoliosis and fairly significant degenerative change at L4-5 and L5-S1.  On the axial images the L1-2 level shows some disc bulging into the neuroforamen on the left side but it is not compressing the nerve.  L2-3 looks okay for the most part.  L3-4 is also open.  L4-5 shows previous surgery it looks like on the right side and there is what appears to be either epidural fibrosis or foraminal stenosis on the right and may be a recurrent disc.  L5-S1 shows a disc bulge to the right side that is fairly large.    Medical Decision Making:      I told the patient about the imaging.  I told her that from my point of view it looks like her previous surgery was on the right side at L4-5.  I am a little unclear on what was done before and we will try to get those notes.  In the meantime I recommended a lumbar myelogram.  I also told her I would agree with Dr. Cardona that she may need a fusion but he only does outpatient surgery in my opinion the fusion should not be done as an outpatient.  I told the patient what a myelogram involves.  I explained that there is a 50% chance of developing a bad headache and nausea as a result of the test.  I explained that there is also a very small chance of  infection, seizures, and bleeding.  I explained how we would treat a post myelogram headache including bedrest, caffeinated fluids, steroids, and blood patch.  The patient does ask to proceed.    Katia was seen today for back pain.    Diagnoses and all orders for this visit:    Radiculopathy of leg  -     Obtain Informed Consent; Standing  -     IR Myelogram Lumbar Spine; Future  -     CT Lumbar Spine With Intrathecal Contrast; Future  -     XR Spine Lumbar Complete With Flex & Ext; Future  -     No Lab Testing Needed; Standing  -     dexamethasone (DECADRON) 4 MG tablet; Take 2 tablets by mouth Take As Directed. Take both tablets by mouth 2 hours before myelogram      Return for After radiology test.

## 2019-10-03 ENCOUNTER — OFFICE VISIT (OUTPATIENT)
Dept: NEUROSURGERY | Facility: CLINIC | Age: 49
End: 2019-10-03

## 2019-10-03 VITALS — SYSTOLIC BLOOD PRESSURE: 138 MMHG | HEART RATE: 120 BPM | DIASTOLIC BLOOD PRESSURE: 87 MMHG

## 2019-10-03 DIAGNOSIS — M54.10 RADICULOPATHY OF LEG: Primary | ICD-10-CM

## 2019-10-03 PROCEDURE — 99214 OFFICE O/P EST MOD 30 MIN: CPT | Performed by: NEUROLOGICAL SURGERY

## 2019-10-03 RX ORDER — DEXAMETHASONE 4 MG/1
8 TABLET ORAL TAKE AS DIRECTED
Qty: 2 TABLET | Refills: 0 | Status: SHIPPED | OUTPATIENT
Start: 2019-10-03 | End: 2019-12-13 | Stop reason: HOSPADM

## 2019-10-03 RX ORDER — MELOXICAM 15 MG/1
TABLET ORAL
COMMUNITY
Start: 2019-09-28 | End: 2019-10-03 | Stop reason: SDUPTHER

## 2019-10-25 ENCOUNTER — LAB REQUISITION (OUTPATIENT)
Dept: LAB | Facility: HOSPITAL | Age: 49
End: 2019-10-25

## 2019-10-25 DIAGNOSIS — M51.06 INTERVERTEBRAL DISC DISORDERS WITH MYELOPATHY, LUMBAR REGION: ICD-10-CM

## 2019-10-25 PROCEDURE — 88304 TISSUE EXAM BY PATHOLOGIST: CPT | Performed by: ORTHOPAEDIC SURGERY

## 2019-10-26 LAB
CYTO UR: NORMAL
LAB AP CASE REPORT: NORMAL
LAB AP CLINICAL INFORMATION: NORMAL
PATH REPORT.FINAL DX SPEC: NORMAL
PATH REPORT.GROSS SPEC: NORMAL

## 2019-10-29 ENCOUNTER — HOSPITAL ENCOUNTER (OUTPATIENT)
Dept: GENERAL RADIOLOGY | Facility: HOSPITAL | Age: 49
Discharge: HOME | End: 2019-10-29

## 2019-11-07 ENCOUNTER — APPOINTMENT (OUTPATIENT)
Dept: BONE DENSITY | Facility: HOSPITAL | Age: 49
End: 2019-11-07

## 2019-11-11 ENCOUNTER — OFFICE VISIT (OUTPATIENT)
Dept: FAMILY MEDICINE CLINIC | Facility: CLINIC | Age: 49
End: 2019-11-11

## 2019-11-11 VITALS
RESPIRATION RATE: 16 BRPM | HEART RATE: 90 BPM | BODY MASS INDEX: 22.09 KG/M2 | HEIGHT: 61 IN | DIASTOLIC BLOOD PRESSURE: 96 MMHG | WEIGHT: 117 LBS | TEMPERATURE: 97.7 F | SYSTOLIC BLOOD PRESSURE: 132 MMHG

## 2019-11-11 DIAGNOSIS — L76.34 POSTOPERATIVE SEROMA OF SUBCUTANEOUS TISSUE AFTER NON-DERMATOLOGIC PROCEDURE: Primary | ICD-10-CM

## 2019-11-11 PROCEDURE — 99213 OFFICE O/P EST LOW 20 MIN: CPT | Performed by: FAMILY MEDICINE

## 2019-11-11 RX ORDER — FLUCONAZOLE 150 MG/1
150 TABLET ORAL ONCE
Qty: 1 TABLET | Refills: 0 | Status: SHIPPED | OUTPATIENT
Start: 2019-11-11 | End: 2019-11-11

## 2019-11-11 RX ORDER — ROSUVASTATIN CALCIUM 5 MG/1
5 TABLET, COATED ORAL NIGHTLY
COMMUNITY
Start: 2019-10-10 | End: 2021-06-16

## 2019-11-11 RX ORDER — AMOXICILLIN AND CLAVULANATE POTASSIUM 875; 125 MG/1; MG/1
1 TABLET, FILM COATED ORAL EVERY 12 HOURS SCHEDULED
Qty: 14 TABLET | Refills: 0 | Status: SHIPPED | OUTPATIENT
Start: 2019-11-11 | End: 2019-12-13 | Stop reason: HOSPADM

## 2019-11-11 NOTE — PROGRESS NOTES
"Subjective   Katia Puente is a 49 y.o. female.     CC: Wound Issue    History of Present Illness     Pt comes I today reporting that she had a laminectomy 10/24 and reports the wound is still leaking and is swollen. Is icing the region. No f/c of significance. No current abx use. The d/c is yellow. Pt is going to be following up with her surgeon.       The following portions of the patient's history were reviewed and updated as appropriate: allergies, current medications, past family history, past medical history, past social history, past surgical history and problem list.    Review of Systems   Constitutional: Negative for chills and fever.   Skin:        Wound irritation       /96   Pulse 90   Temp 97.7 °F (36.5 °C) (Oral)   Resp 16   Ht 154.9 cm (61\")   Wt 53.1 kg (117 lb)   BMI 22.11 kg/m²     Objective   Physical Exam   Constitutional: She appears well-developed and well-nourished.   Skin:   1 x 2 cm noted seroma at the surgical site w/o signs of infection.   Psychiatric: She has a normal mood and affect.   Luisa present for exam.    Assessment/Plan   Katia was seen today for wound check  lower back pain.    Diagnoses and all orders for this visit:    Postoperative seroma of subcutaneous tissue after non-dermatologic procedure  -     amoxicillin-clavulanate (AUGMENTIN) 875-125 MG per tablet; Take 1 tablet by mouth Every 12 (Twelve) Hours.    Other orders  -     fluconazole (DIFLUCAN) 150 MG tablet; Take 1 tablet by mouth 1 (One) Time for 1 dose.    Will cover for 7 days with Augmentin just to be safe and pt to f/u with her surgeon.           "

## 2019-12-08 ENCOUNTER — HOSPITAL ENCOUNTER (EMERGENCY)
Facility: HOSPITAL | Age: 49
Discharge: HOME | End: 2019-12-09
Attending: EMERGENCY MEDICINE | Admitting: EMERGENCY MEDICINE

## 2019-12-08 DIAGNOSIS — N39.0 ACUTE UTI: Primary | ICD-10-CM

## 2019-12-08 DIAGNOSIS — G89.18 POST-OPERATIVE PAIN: ICD-10-CM

## 2019-12-08 LAB
BASOPHILS # BLD AUTO: 0.06 10*3/MM3 (ref 0–0.2)
BASOPHILS NFR BLD AUTO: 0.8 % (ref 0–1.5)
DEPRECATED RDW RBC AUTO: 41.4 FL (ref 37–54)
EOSINOPHIL # BLD AUTO: 0.42 10*3/MM3 (ref 0–0.4)
EOSINOPHIL NFR BLD AUTO: 5.7 % (ref 0.3–6.2)
ERYTHROCYTE [DISTWIDTH] IN BLOOD BY AUTOMATED COUNT: 12.3 % (ref 12.3–15.4)
HCT VFR BLD AUTO: 38.8 % (ref 34–46.6)
HGB BLD-MCNC: 13.1 G/DL (ref 12–15.9)
IMM GRANULOCYTES # BLD AUTO: 0.02 10*3/MM3 (ref 0–0.05)
IMM GRANULOCYTES NFR BLD AUTO: 0.3 % (ref 0–0.5)
LYMPHOCYTES # BLD AUTO: 2.11 10*3/MM3 (ref 0.7–3.1)
LYMPHOCYTES NFR BLD AUTO: 28.6 % (ref 19.6–45.3)
MCH RBC QN AUTO: 31.3 PG (ref 26.6–33)
MCHC RBC AUTO-ENTMCNC: 33.8 G/DL (ref 31.5–35.7)
MCV RBC AUTO: 92.6 FL (ref 79–97)
MONOCYTES # BLD AUTO: 0.69 10*3/MM3 (ref 0.1–0.9)
MONOCYTES NFR BLD AUTO: 9.3 % (ref 5–12)
NEUTROPHILS # BLD AUTO: 4.08 10*3/MM3 (ref 1.7–7)
NEUTROPHILS NFR BLD AUTO: 55.3 % (ref 42.7–76)
NRBC BLD AUTO-RTO: 0 /100 WBC (ref 0–0.2)
PLATELET # BLD AUTO: 427 10*3/MM3 (ref 140–450)
PMV BLD AUTO: 9.5 FL (ref 6–12)
RBC # BLD AUTO: 4.19 10*6/MM3 (ref 3.77–5.28)
WBC NRBC COR # BLD: 7.38 10*3/MM3 (ref 3.4–10.8)

## 2019-12-08 PROCEDURE — 25010000002 ONDANSETRON PER 1 MG: Performed by: EMERGENCY MEDICINE

## 2019-12-08 PROCEDURE — 85025 COMPLETE CBC W/AUTO DIFF WBC: CPT | Performed by: EMERGENCY MEDICINE

## 2019-12-08 PROCEDURE — 25010000002 HYDROMORPHONE PER 4 MG: Performed by: EMERGENCY MEDICINE

## 2019-12-08 PROCEDURE — 96375 TX/PRO/DX INJ NEW DRUG ADDON: CPT

## 2019-12-08 PROCEDURE — 99284 EMERGENCY DEPT VISIT MOD MDM: CPT

## 2019-12-08 PROCEDURE — 96374 THER/PROPH/DIAG INJ IV PUSH: CPT

## 2019-12-08 PROCEDURE — 80053 COMPREHEN METABOLIC PANEL: CPT | Performed by: EMERGENCY MEDICINE

## 2019-12-08 RX ORDER — SODIUM CHLORIDE 0.9 % (FLUSH) 0.9 %
10 SYRINGE (ML) INJECTION AS NEEDED
Status: DISCONTINUED | OUTPATIENT
Start: 2019-12-08 | End: 2019-12-09 | Stop reason: HOSPADM

## 2019-12-08 RX ORDER — ONDANSETRON 2 MG/ML
4 INJECTION INTRAMUSCULAR; INTRAVENOUS ONCE
Status: COMPLETED | OUTPATIENT
Start: 2019-12-08 | End: 2019-12-08

## 2019-12-08 RX ORDER — HYDROMORPHONE HYDROCHLORIDE 1 MG/ML
1 INJECTION, SOLUTION INTRAMUSCULAR; INTRAVENOUS; SUBCUTANEOUS ONCE
Status: COMPLETED | OUTPATIENT
Start: 2019-12-08 | End: 2019-12-08

## 2019-12-08 RX ADMIN — ONDANSETRON 4 MG: 2 INJECTION INTRAMUSCULAR; INTRAVENOUS at 23:52

## 2019-12-08 RX ADMIN — HYDROMORPHONE HYDROCHLORIDE 1 MG: 1 INJECTION, SOLUTION INTRAMUSCULAR; INTRAVENOUS; SUBCUTANEOUS at 23:52

## 2019-12-09 VITALS
OXYGEN SATURATION: 98 % | WEIGHT: 116.7 LBS | BODY MASS INDEX: 22.03 KG/M2 | TEMPERATURE: 98 F | HEIGHT: 61 IN | DIASTOLIC BLOOD PRESSURE: 81 MMHG | HEART RATE: 74 BPM | RESPIRATION RATE: 18 BRPM | SYSTOLIC BLOOD PRESSURE: 112 MMHG

## 2019-12-09 LAB
ALBUMIN SERPL-MCNC: 3.9 G/DL (ref 3.5–5.2)
ALBUMIN/GLOB SERPL: 1.7 G/DL
ALP SERPL-CCNC: 51 U/L (ref 39–117)
ALT SERPL W P-5'-P-CCNC: 34 U/L (ref 1–33)
ANION GAP SERPL CALCULATED.3IONS-SCNC: 12.4 MMOL/L (ref 5–15)
AST SERPL-CCNC: 26 U/L (ref 1–32)
BACTERIA UR QL AUTO: ABNORMAL /HPF
BILIRUB SERPL-MCNC: <0.2 MG/DL (ref 0.2–1.2)
BILIRUB UR QL STRIP: NEGATIVE
BUN BLD-MCNC: 16 MG/DL (ref 6–20)
BUN/CREAT SERPL: 25.4 (ref 7–25)
CALCIUM SPEC-SCNC: 9 MG/DL (ref 8.6–10.5)
CHLORIDE SERPL-SCNC: 102 MMOL/L (ref 98–107)
CLARITY UR: ABNORMAL
CO2 SERPL-SCNC: 26.6 MMOL/L (ref 22–29)
COLOR UR: YELLOW
CREAT BLD-MCNC: 0.63 MG/DL (ref 0.57–1)
GFR SERPL CREATININE-BSD FRML MDRD: 100 ML/MIN/1.73
GLOBULIN UR ELPH-MCNC: 2.3 GM/DL
GLUCOSE BLD-MCNC: 87 MG/DL (ref 65–99)
GLUCOSE UR STRIP-MCNC: NEGATIVE MG/DL
HGB UR QL STRIP.AUTO: NEGATIVE
HYALINE CASTS UR QL AUTO: ABNORMAL /LPF
KETONES UR QL STRIP: NEGATIVE
LEUKOCYTE ESTERASE UR QL STRIP.AUTO: ABNORMAL
NITRITE UR QL STRIP: NEGATIVE
PH UR STRIP.AUTO: 7 [PH] (ref 5–8)
POTASSIUM BLD-SCNC: 4.1 MMOL/L (ref 3.5–5.2)
PROT SERPL-MCNC: 6.2 G/DL (ref 6–8.5)
PROT UR QL STRIP: NEGATIVE
RBC # UR: ABNORMAL /HPF
REF LAB TEST METHOD: ABNORMAL
SODIUM BLD-SCNC: 141 MMOL/L (ref 136–145)
SP GR UR STRIP: 1.01 (ref 1–1.03)
SQUAMOUS #/AREA URNS HPF: ABNORMAL /HPF
UROBILINOGEN UR QL STRIP: ABNORMAL
WBC UR QL AUTO: ABNORMAL /HPF

## 2019-12-09 PROCEDURE — 96376 TX/PRO/DX INJ SAME DRUG ADON: CPT

## 2019-12-09 PROCEDURE — 25010000002 HYDROMORPHONE PER 4 MG: Performed by: EMERGENCY MEDICINE

## 2019-12-09 PROCEDURE — 81001 URINALYSIS AUTO W/SCOPE: CPT | Performed by: EMERGENCY MEDICINE

## 2019-12-09 RX ORDER — HYDROCODONE BITARTRATE AND ACETAMINOPHEN 7.5; 325 MG/1; MG/1
1 TABLET ORAL EVERY 6 HOURS PRN
Qty: 8 TABLET | Refills: 0 | Status: SHIPPED | OUTPATIENT
Start: 2019-12-09 | End: 2019-12-13 | Stop reason: HOSPADM

## 2019-12-09 RX ORDER — CEPHALEXIN 500 MG/1
500 CAPSULE ORAL 2 TIMES DAILY
Qty: 14 CAPSULE | Refills: 0 | Status: SHIPPED | OUTPATIENT
Start: 2019-12-09 | End: 2019-12-13 | Stop reason: HOSPADM

## 2019-12-09 RX ORDER — FLUCONAZOLE 150 MG/1
150 TABLET ORAL ONCE
Status: COMPLETED | OUTPATIENT
Start: 2019-12-09 | End: 2019-12-09

## 2019-12-09 RX ORDER — HYDROMORPHONE HYDROCHLORIDE 1 MG/ML
0.5 INJECTION, SOLUTION INTRAMUSCULAR; INTRAVENOUS; SUBCUTANEOUS ONCE
Status: COMPLETED | OUTPATIENT
Start: 2019-12-09 | End: 2019-12-09

## 2019-12-09 RX ADMIN — HYDROMORPHONE HYDROCHLORIDE 0.5 MG: 1 INJECTION, SOLUTION INTRAMUSCULAR; INTRAVENOUS; SUBCUTANEOUS at 00:30

## 2019-12-09 RX ADMIN — FLUCONAZOLE 150 MG: 150 TABLET ORAL at 02:13

## 2019-12-09 RX ADMIN — HYDROMORPHONE HYDROCHLORIDE 0.5 MG: 1 INJECTION, SOLUTION INTRAMUSCULAR; INTRAVENOUS; SUBCUTANEOUS at 02:14

## 2019-12-09 NOTE — ED PROVIDER NOTES
Pt presents to ED c/o lumbar back pain that began a few weeks ago, but worsened after a car ride to [place] 2 days ago. She had laminectomy by Dr. Carrasco approx. 6 weeks ago and is scheduled to follow up with him tomorrow. She denies fever, chills, numbness, or urinary/fecal incontinence.    Upon exam, pt has L lateral lumbar scarring with palpable knotted scar tissue, there are no deformities, lumbar tenderness, or swelling at the incision site. She is freely mobile without difficulty. Pt is resting comfortably, in no distress, and without focal neurologic deficit    Discussed with pt plan to obtain lab results and control her pain prior to disposition. Pt understands and agrees with the plan, all questions answered.    MD ATTESTATION NOTE    The JORGE L and I have discussed this patient's history, physical exam, and treatment plan.  I have reviewed the documentation and personally had a face to face interaction with the patient. I affirm the documentation and agree with the treatment and plan.  The attached note describes my personal findings.    Documentation assistance provided by lurdes Zafar MD for Dr. Zafar.  Information recorded by the lurdes was done at my direction and has been verified and validated by me.     Leeanne Capps  12/09/19 0025       Quinn Zafar MD  12/09/19 2299

## 2019-12-09 NOTE — ED TRIAGE NOTES
"Pt to ER via [facility]. Pt states lumbar back surgery x6 weeks ago. Pt states the last day or so she is having severe lower back pain. Pt states \"knots\" that are above the incision. Pt denies urinary symptoms.   "

## 2019-12-09 NOTE — ED PROVIDER NOTES
" EMERGENCY DEPARTMENT ENCOUNTER    Room Number:  18/18  Date of encounter:  12/9/2019  PCP: Rene Rivera MD   Surgeon: Dr. Overton  Historian: Patient      HPI:  Chief Complaint: Post-op Problem  A complete HPI/ROS/PMH/PSH/SH/FH are unobtainable due to: none  Context: Katia Puente is a 49 y.o. female who presents to the ED c/o post-op problem that began around 3 weeks ago and became acutely worse 2 days after a car ride from [place]. She had previous lumbar laminectomy and discetomy around 6 weeks ago.She states having some \"knots\" that formed above her incision site and also developed a seroma to the area. Pt is scheduled to follow-up with surgeon tomorrow. She states some radiation of the pain to the R leg with numbness. No bladder/bowel incontinence, saddle anesthesia, weakness      MEDICAL HISTORY REVIEW  No surgery records in [facility]. Pt seen at PCP on 11/11/19. Started on Augmentin.    PAST MEDICAL HISTORY  Active Ambulatory Problems     Diagnosis Date Noted   • Hemorrhoids 05/02/2012   • Hypothyroidism    • Stress disorder, acute 11/09/2015   • ADD (attention deficit disorder) 09/08/2016   • Postmenopausal HRT (hormone replacement therapy) 09/08/2016   • Radiculopathy of leg 05/23/2018   • Subacute vulvitis 05/17/2019   • History of total vaginal hysterectomy (TVH) 05/17/2019     Resolved Ambulatory Problems     Diagnosis Date Noted   • No Resolved Ambulatory Problems     Past Medical History:   Diagnosis Date   • Allergic    • Breast pain, left    • Elevated cholesterol    • Murmur    • Vaginal atrophy          PAST SURGICAL HISTORY  Past Surgical History:   Procedure Laterality Date   • BACK SURGERY      L4, L5 discectomy   • BREAST SURGERY     • CHOLECYSTECTOMY     • ENDOSCOPY  01/2013   • GALLBLADDER SURGERY  2016   • HEMORRHOIDECTOMY     • HYSTERECTOMY  2008   • LASIK  1998   • TONSILLECTOMY           FAMILY HISTORY  Family History   Problem Relation Age of Onset   • Heart disease Mother    • Heart " disease Father    • Diabetes Paternal Aunt    • Stroke Maternal Grandmother    • Cancer Paternal Grandfather          SOCIAL HISTORY  Social History     Socioeconomic History   • Marital status:      Spouse name: Not on file   • Number of children: Not on file   • Years of education: Not on file   • Highest education level: Not on file   Tobacco Use   • Smoking status: Never Smoker   • Smokeless tobacco: Never Used   Substance and Sexual Activity   • Alcohol use: Yes     Comment: current some day; occ   • Drug use: No   • Sexual activity: Yes     Partners: Male     Birth control/protection: Surgical         ALLERGIES  Codeine and Erythromycin        REVIEW OF SYSTEMS  Review of Systems   Constitutional: Negative for fever.   HENT: Negative for sore throat.    Eyes: Negative.    Respiratory: Negative for cough and shortness of breath.    Cardiovascular: Negative for chest pain.   Gastrointestinal: Negative for abdominal pain, diarrhea and vomiting.   Genitourinary: Negative for dysuria.   Musculoskeletal: Positive for back pain. Negative for neck pain.   Skin: Negative for rash.   Allergic/Immunologic: Negative.    Neurological: Positive for numbness (R leg). Negative for weakness and headaches.   Hematological: Negative.    Psychiatric/Behavioral: Negative.    All other systems reviewed and are negative.       All systems reviewed and negative except for those discussed in HPI.       PHYSICAL EXAM    I have reviewed the triage vital signs and nursing notes.    ED Triage Vitals   Temp Heart Rate Resp BP SpO2   12/08/19 2309 12/08/19 2309 12/08/19 2309 12/08/19 2341 12/08/19 2309   97.1 °F (36.2 °C) 88 18 134/88 94 %      Temp src Heart Rate Source Patient Position BP Location FiO2 (%)   12/08/19 2309 12/08/19 2309 -- -- --   Tympanic Monitor          GENERAL: not distressed  HENT: nares patent  EYES: no scleral icterus  CV: regular rhythm, regular rate  RESPIRATORY: normal effort  MUSCULOSKELETAL: no  deformity, 2x 5cm well healed surgical scars to the lumbar area with small densities consistent with scar tissue. There are no signs of infection  NEURO: alert, moves all extremities, follows commands  SKIN: warm, dry        LAB RESULTS  Recent Results (from the past 24 hour(s))   Comprehensive Metabolic Panel    Collection Time: 12/08/19 11:49 PM   Result Value Ref Range    Glucose 87 65 - 99 mg/dL    BUN 16 6 - 20 mg/dL    Creatinine 0.63 0.57 - 1.00 mg/dL    Sodium 141 136 - 145 mmol/L    Potassium 4.1 3.5 - 5.2 mmol/L    Chloride 102 98 - 107 mmol/L    CO2 26.6 22.0 - 29.0 mmol/L    Calcium 9.0 8.6 - 10.5 mg/dL    Total Protein 6.2 6.0 - 8.5 g/dL    Albumin 3.90 3.50 - 5.20 g/dL    ALT (SGPT) 34 (H) 1 - 33 U/L    AST (SGOT) 26 1 - 32 U/L    Alkaline Phosphatase 51 39 - 117 U/L    Total Bilirubin <0.2 (L) 0.2 - 1.2 mg/dL    eGFR Non African Amer 100 >60 mL/min/1.73    Globulin 2.3 gm/dL    A/G Ratio 1.7 g/dL    BUN/Creatinine Ratio 25.4 (H) 7.0 - 25.0    Anion Gap 12.4 5.0 - 15.0 mmol/L   CBC Auto Differential    Collection Time: 12/08/19 11:49 PM   Result Value Ref Range    WBC 7.38 3.40 - 10.80 10*3/mm3    RBC 4.19 3.77 - 5.28 10*6/mm3    Hemoglobin 13.1 12.0 - 15.9 g/dL    Hematocrit 38.8 34.0 - 46.6 %    MCV 92.6 79.0 - 97.0 fL    MCH 31.3 26.6 - 33.0 pg    MCHC 33.8 31.5 - 35.7 g/dL    RDW 12.3 12.3 - 15.4 %    RDW-SD 41.4 37.0 - 54.0 fl    MPV 9.5 6.0 - 12.0 fL    Platelets 427 140 - 450 10*3/mm3    Neutrophil % 55.3 42.7 - 76.0 %    Lymphocyte % 28.6 19.6 - 45.3 %    Monocyte % 9.3 5.0 - 12.0 %    Eosinophil % 5.7 0.3 - 6.2 %    Basophil % 0.8 0.0 - 1.5 %    Immature Grans % 0.3 0.0 - 0.5 %    Neutrophils, Absolute 4.08 1.70 - 7.00 10*3/mm3    Lymphocytes, Absolute 2.11 0.70 - 3.10 10*3/mm3    Monocytes, Absolute 0.69 0.10 - 0.90 10*3/mm3    Eosinophils, Absolute 0.42 (H) 0.00 - 0.40 10*3/mm3    Basophils, Absolute 0.06 0.00 - 0.20 10*3/mm3    Immature Grans, Absolute 0.02 0.00 - 0.05 10*3/mm3    nRBC 0.0  0.0 - 0.2 /100 WBC   Urinalysis With Microscopic If Indicated (No Culture) - Urine, Clean Catch    Collection Time: 12/09/19 12:09 AM   Result Value Ref Range    Color, UA Yellow Yellow, Straw    Appearance, UA Cloudy (A) Clear    pH, UA 7.0 5.0 - 8.0    Specific Gravity, UA 1.014 1.005 - 1.030    Glucose, UA Negative Negative    Ketones, UA Negative Negative    Bilirubin, UA Negative Negative    Blood, UA Negative Negative    Protein, UA Negative Negative    Leuk Esterase, UA Small (1+) (A) Negative    Nitrite, UA Negative Negative    Urobilinogen, UA 0.2 E.U./dL 0.2 - 1.0 E.U./dL   Urinalysis, Microscopic Only - Urine, Clean Catch    Collection Time: 12/09/19 12:09 AM   Result Value Ref Range    RBC, UA 0-2 None Seen, 0-2 /HPF    WBC, UA 21-30 (A) None Seen, 0-2 /HPF    Bacteria, UA 4+ (A) None Seen /HPF    Squamous Epithelial Cells, UA 3-6 (A) None Seen, 0-2 /HPF    Hyaline Casts, UA 3-6 None Seen /LPF    Methodology Automated Microscopy        Ordered the above labs and independently reviewed the results.        RADIOLOGY  No Radiology Exams Resulted Within Past 24 Hours    I ordered the above noted radiological studies. Reviewed by me and discussed with radiologist.  See dictation for official radiology interpretation.      PROCEDURES    Procedures      MEDICATIONS GIVEN IN ER    Medications   HYDROmorphone PF (DILAUDID) injection 1 mg (1 mg Intravenous Given 12/8/19 2352)   ondansetron (ZOFRAN) injection 4 mg (4 mg Intravenous Given 12/8/19 2352)   HYDROmorphone (DILAUDID) injection 0.5 mg (0.5 mg Intravenous Given 12/9/19 0030)   HYDROmorphone (DILAUDID) injection 0.5 mg (0.5 mg Intravenous Given 12/9/19 0214)   fluconazole (DIFLUCAN) tablet 150 mg (150 mg Oral Given 12/9/19 0213)         PROGRESS, DATA ANALYSIS, CONSULTS, AND MEDICAL DECISION MAKING    12:15 AM  Blood work reviewed. Dilaudid/Zofran ordered. Reviewed pt's history and workup with Dr. Zafar.  After a bedside evaluation; Dr Zafar agrees with  the plan of care    1:58 AM  Rechecked with pt. Discussed with pt about plan to discharge with pain control and instructed to follow-up with surgeon as scheduled. Pt understands and agrees with plan. All concerns were addressed.      All labs have been independently reviewed by me.  All radiology studies have been reviewed by me and discussed with radiologist dictating the report.   EKG's independently viewed and interpreted by me.  Discussion below represents my analysis of pertinent findings related to patient's condition, differential diagnosis, treatment plan and final disposition.           AS OF 5:51 AM VITALS:    BP - 112/81  HR - 74  TEMP - 98 °F (36.7 °C) (Oral)  O2 SATS - 98%        DIAGNOSIS  Final diagnoses:   Acute UTI   Post-operative pain (lower back)         DISPOSITION  DISCHARGE    Patient discharged in stable condition.    Reviewed implications of results, diagnosis, meds, responsibility to follow up, warning signs and symptoms of possible worsening, potential complications and reasons to return to ER.    Patient/Family voiced understanding of above instructions.    Discussed plan for discharge, as there is no emergent indication for admission. Patient referred to primary care provider for BP management due to today's BP. Pt/family is agreeable and understands need for follow up and repeat testing.  Pt is aware that discharge does not mean that nothing is wrong but it indicates no emergency is present that requires admission and they must continue care with follow-up as given below or physician of their choice.     FOLLOW-UP  Dr Cardona    Today  For further evaluation and treatment         Medication List      New Prescriptions    cephalexin 500 MG capsule  Commonly known as:  KEFLEX  Take 1 capsule by mouth 2 (Two) Times a Day.     HYDROcodone-acetaminophen 7.5-325 MG per tablet  Commonly known as:  NORCO  Take 1 tablet by mouth Every 6 (Six) Hours As Needed for Severe Pain .                   Documentation assistance provided by lurdes Renteria for Abhishek Reno PA-C.  Information recorded by the scribe was done at my direction and has been verified and validated by me.         Gerry Renteria  12/09/19 0202       Edinson Reno III, PA  12/09/19 0560

## 2019-12-10 ENCOUNTER — TRANSCRIBE ORDERS (OUTPATIENT)
Dept: ADMINISTRATIVE | Facility: HOSPITAL | Age: 49
End: 2019-12-10

## 2019-12-10 ENCOUNTER — HOSPITAL ENCOUNTER (INPATIENT)
Facility: HOSPITAL | Age: 49
LOS: 2 days | Discharge: HOME | End: 2019-12-13
Attending: EMERGENCY MEDICINE | Admitting: INTERNAL MEDICINE

## 2019-12-10 DIAGNOSIS — M54.41 ACUTE MIDLINE LOW BACK PAIN WITH RIGHT-SIDED SCIATICA: ICD-10-CM

## 2019-12-10 DIAGNOSIS — M54.10 RADICULOPATHY OF LEG: ICD-10-CM

## 2019-12-10 DIAGNOSIS — R52 INTRACTABLE PAIN: Primary | ICD-10-CM

## 2019-12-10 PROCEDURE — 99284 EMERGENCY DEPT VISIT MOD MDM: CPT

## 2019-12-10 PROCEDURE — 51798 US URINE CAPACITY MEASURE: CPT

## 2019-12-10 PROCEDURE — 85025 COMPLETE CBC W/AUTO DIFF WBC: CPT | Performed by: PHYSICIAN ASSISTANT

## 2019-12-10 PROCEDURE — 84703 CHORIONIC GONADOTROPIN ASSAY: CPT | Performed by: PHYSICIAN ASSISTANT

## 2019-12-10 PROCEDURE — 25010000002 HYDROMORPHONE PER 4 MG: Performed by: EMERGENCY MEDICINE

## 2019-12-10 PROCEDURE — 80048 BASIC METABOLIC PNL TOTAL CA: CPT | Performed by: PHYSICIAN ASSISTANT

## 2019-12-10 PROCEDURE — 25010000002 ONDANSETRON PER 1 MG: Performed by: EMERGENCY MEDICINE

## 2019-12-10 RX ORDER — HYDROMORPHONE HYDROCHLORIDE 1 MG/ML
1 INJECTION, SOLUTION INTRAMUSCULAR; INTRAVENOUS; SUBCUTANEOUS ONCE
Status: COMPLETED | OUTPATIENT
Start: 2019-12-10 | End: 2019-12-10

## 2019-12-10 RX ORDER — SODIUM CHLORIDE 0.9 % (FLUSH) 0.9 %
10 SYRINGE (ML) INJECTION AS NEEDED
Status: DISCONTINUED | OUTPATIENT
Start: 2019-12-10 | End: 2019-12-13 | Stop reason: HOSPADM

## 2019-12-10 RX ORDER — ONDANSETRON 2 MG/ML
4 INJECTION INTRAMUSCULAR; INTRAVENOUS ONCE
Status: COMPLETED | OUTPATIENT
Start: 2019-12-10 | End: 2019-12-10

## 2019-12-10 RX ADMIN — HYDROMORPHONE HYDROCHLORIDE 1 MG: 1 INJECTION, SOLUTION INTRAMUSCULAR; INTRAVENOUS; SUBCUTANEOUS at 23:43

## 2019-12-10 RX ADMIN — SODIUM CHLORIDE 1000 ML: 9 INJECTION, SOLUTION INTRAVENOUS at 23:37

## 2019-12-10 RX ADMIN — SODIUM CHLORIDE, PRESERVATIVE FREE 10 ML: 5 INJECTION INTRAVENOUS at 23:36

## 2019-12-10 RX ADMIN — ONDANSETRON 4 MG: 2 INJECTION INTRAMUSCULAR; INTRAVENOUS at 23:42

## 2019-12-11 ENCOUNTER — APPOINTMENT (OUTPATIENT)
Dept: GENERAL RADIOLOGY | Facility: HOSPITAL | Age: 49
End: 2019-12-11

## 2019-12-11 ENCOUNTER — APPOINTMENT (OUTPATIENT)
Dept: MRI IMAGING | Facility: HOSPITAL | Age: 49
End: 2019-12-11

## 2019-12-11 ENCOUNTER — APPOINTMENT (OUTPATIENT)
Dept: CT IMAGING | Facility: HOSPITAL | Age: 49
End: 2019-12-11

## 2019-12-11 PROBLEM — M54.41 ACUTE MIDLINE LOW BACK PAIN WITH RIGHT-SIDED SCIATICA: Status: ACTIVE | Noted: 2019-12-11

## 2019-12-11 PROBLEM — R52 INTRACTABLE PAIN: Status: ACTIVE | Noted: 2019-12-11

## 2019-12-11 LAB
ANION GAP SERPL CALCULATED.3IONS-SCNC: 10.4 MMOL/L (ref 5–15)
ANION GAP SERPL CALCULATED.3IONS-SCNC: 9.6 MMOL/L (ref 5–15)
APPEARANCE FLD: CLEAR
BASOPHILS # BLD AUTO: 0.05 10*3/MM3 (ref 0–0.2)
BASOPHILS NFR BLD AUTO: 0.7 % (ref 0–1.5)
BILIRUB UR QL STRIP: NEGATIVE
BUN BLD-MCNC: 11 MG/DL (ref 6–20)
BUN BLD-MCNC: 14 MG/DL (ref 6–20)
BUN/CREAT SERPL: 16.9 (ref 7–25)
BUN/CREAT SERPL: 23.3 (ref 7–25)
CALCIUM SPEC-SCNC: 8.9 MG/DL (ref 8.6–10.5)
CALCIUM SPEC-SCNC: 9.2 MG/DL (ref 8.6–10.5)
CHLORIDE SERPL-SCNC: 103 MMOL/L (ref 98–107)
CHLORIDE SERPL-SCNC: 104 MMOL/L (ref 98–107)
CLARITY UR: CLEAR
CO2 SERPL-SCNC: 25.6 MMOL/L (ref 22–29)
CO2 SERPL-SCNC: 27.4 MMOL/L (ref 22–29)
COLOR FLD: COLORLESS
COLOR UR: YELLOW
CREAT BLD-MCNC: 0.6 MG/DL (ref 0.57–1)
CREAT BLD-MCNC: 0.65 MG/DL (ref 0.57–1)
DEPRECATED RDW RBC AUTO: 42.3 FL (ref 37–54)
DEPRECATED RDW RBC AUTO: 42.4 FL (ref 37–54)
EOSINOPHIL # BLD AUTO: 0.38 10*3/MM3 (ref 0–0.4)
EOSINOPHIL NFR BLD AUTO: 5.3 % (ref 0.3–6.2)
ERYTHROCYTE [DISTWIDTH] IN BLOOD BY AUTOMATED COUNT: 12.3 % (ref 12.3–15.4)
ERYTHROCYTE [DISTWIDTH] IN BLOOD BY AUTOMATED COUNT: 12.5 % (ref 12.3–15.4)
GFR SERPL CREATININE-BSD FRML MDRD: 106 ML/MIN/1.73
GFR SERPL CREATININE-BSD FRML MDRD: 97 ML/MIN/1.73
GLUCOSE BLD-MCNC: 77 MG/DL (ref 65–99)
GLUCOSE BLD-MCNC: 94 MG/DL (ref 65–99)
GLUCOSE UR STRIP-MCNC: NEGATIVE MG/DL
HCG SERPL QL: NEGATIVE
HCT VFR BLD AUTO: 39.3 % (ref 34–46.6)
HCT VFR BLD AUTO: 42.5 % (ref 34–46.6)
HGB BLD-MCNC: 13.1 G/DL (ref 12–15.9)
HGB BLD-MCNC: 14.2 G/DL (ref 12–15.9)
HGB UR QL STRIP.AUTO: NEGATIVE
HOLD SPECIMEN: NORMAL
HOLD SPECIMEN: NORMAL
IMM GRANULOCYTES # BLD AUTO: 0.02 10*3/MM3 (ref 0–0.05)
IMM GRANULOCYTES NFR BLD AUTO: 0.3 % (ref 0–0.5)
KETONES UR QL STRIP: NEGATIVE
LEUKOCYTE ESTERASE UR QL STRIP.AUTO: NEGATIVE
LYMPHOCYTES # BLD AUTO: 2.15 10*3/MM3 (ref 0.7–3.1)
LYMPHOCYTES NFR BLD AUTO: 29.8 % (ref 19.6–45.3)
MCH RBC QN AUTO: 31 PG (ref 26.6–33)
MCH RBC QN AUTO: 31.3 PG (ref 26.6–33)
MCHC RBC AUTO-ENTMCNC: 33.3 G/DL (ref 31.5–35.7)
MCHC RBC AUTO-ENTMCNC: 33.4 G/DL (ref 31.5–35.7)
MCV RBC AUTO: 93.1 FL (ref 79–97)
MCV RBC AUTO: 93.6 FL (ref 79–97)
MONOCYTES # BLD AUTO: 0.79 10*3/MM3 (ref 0.1–0.9)
MONOCYTES NFR BLD AUTO: 10.9 % (ref 5–12)
NEUTROPHILS # BLD AUTO: 3.83 10*3/MM3 (ref 1.7–7)
NEUTROPHILS NFR BLD AUTO: 53 % (ref 42.7–76)
NITRITE UR QL STRIP: NEGATIVE
NRBC BLD AUTO-RTO: 0 /100 WBC (ref 0–0.2)
PH UR STRIP.AUTO: 6 [PH] (ref 5–8)
PLATELET # BLD AUTO: 420 10*3/MM3 (ref 140–450)
PLATELET # BLD AUTO: 490 10*3/MM3 (ref 140–450)
PMV BLD AUTO: 10.7 FL (ref 6–12)
PMV BLD AUTO: 9.6 FL (ref 6–12)
POTASSIUM BLD-SCNC: 4 MMOL/L (ref 3.5–5.2)
POTASSIUM BLD-SCNC: 4.2 MMOL/L (ref 3.5–5.2)
PROT UR QL STRIP: NEGATIVE
RBC # BLD AUTO: 4.22 10*6/MM3 (ref 3.77–5.28)
RBC # BLD AUTO: 4.54 10*6/MM3 (ref 3.77–5.28)
RBC # FLD AUTO: 4 /MM3
SODIUM BLD-SCNC: 140 MMOL/L (ref 136–145)
SODIUM BLD-SCNC: 140 MMOL/L (ref 136–145)
SP GR UR STRIP: 1.01 (ref 1–1.03)
UROBILINOGEN UR QL STRIP: NORMAL
WBC # FLD AUTO: 0 /MM3
WBC NRBC COR # BLD: 7.03 10*3/MM3 (ref 3.4–10.8)
WBC NRBC COR # BLD: 7.22 10*3/MM3 (ref 3.4–10.8)
WHOLE BLOOD HOLD SPECIMEN: NORMAL
WHOLE BLOOD HOLD SPECIMEN: NORMAL

## 2019-12-11 PROCEDURE — 0 IOPAMIDOL 41 % SOLUTION: Performed by: RADIOLOGY

## 2019-12-11 PROCEDURE — 62304 MYELOGRAPHY LUMBAR INJECTION: CPT

## 2019-12-11 PROCEDURE — 72240 MYELOGRAPHY NECK SPINE: CPT

## 2019-12-11 PROCEDURE — G0378 HOSPITAL OBSERVATION PER HR: HCPCS

## 2019-12-11 PROCEDURE — B01B1ZZ FLUOROSCOPY OF SPINAL CORD USING LOW OSMOLAR CONTRAST: ICD-10-PCS | Performed by: RADIOLOGY

## 2019-12-11 PROCEDURE — 25010000002 HYDROMORPHONE PER 4 MG: Performed by: INTERNAL MEDICINE

## 2019-12-11 PROCEDURE — 25010000002 HYDROMORPHONE 1 MG/ML SOLUTION: Performed by: INTERNAL MEDICINE

## 2019-12-11 PROCEDURE — 81003 URINALYSIS AUTO W/O SCOPE: CPT | Performed by: PHYSICIAN ASSISTANT

## 2019-12-11 PROCEDURE — 25010000002 HYDROMORPHONE PER 4 MG: Performed by: EMERGENCY MEDICINE

## 2019-12-11 PROCEDURE — 25010000003 LIDOCAINE 1 % SOLUTION: Performed by: RADIOLOGY

## 2019-12-11 PROCEDURE — 72132 CT LUMBAR SPINE W/DYE: CPT

## 2019-12-11 PROCEDURE — 72158 MRI LUMBAR SPINE W/O & W/DYE: CPT

## 2019-12-11 PROCEDURE — 36415 COLL VENOUS BLD VENIPUNCTURE: CPT | Performed by: NURSE PRACTITIONER

## 2019-12-11 PROCEDURE — 89050 BODY FLUID CELL COUNT: CPT | Performed by: HOSPITALIST

## 2019-12-11 PROCEDURE — A9577 INJ MULTIHANCE: HCPCS | Performed by: HOSPITALIST

## 2019-12-11 PROCEDURE — 80048 BASIC METABOLIC PNL TOTAL CA: CPT | Performed by: NURSE PRACTITIONER

## 2019-12-11 PROCEDURE — 0 GADOBENATE DIMEGLUMINE 529 MG/ML SOLUTION: Performed by: HOSPITALIST

## 2019-12-11 PROCEDURE — 85027 COMPLETE CBC AUTOMATED: CPT | Performed by: NURSE PRACTITIONER

## 2019-12-11 RX ORDER — LEVOTHYROXINE AND LIOTHYRONINE 38; 9 UG/1; UG/1
60 TABLET ORAL
Status: DISCONTINUED | OUTPATIENT
Start: 2019-12-11 | End: 2019-12-13 | Stop reason: HOSPADM

## 2019-12-11 RX ORDER — ACETAMINOPHEN 160 MG/5ML
650 SOLUTION ORAL EVERY 4 HOURS PRN
Status: DISCONTINUED | OUTPATIENT
Start: 2019-12-11 | End: 2019-12-13 | Stop reason: HOSPADM

## 2019-12-11 RX ORDER — CALCIUM CARBONATE 200(500)MG
2 TABLET,CHEWABLE ORAL 2 TIMES DAILY PRN
Status: DISCONTINUED | OUTPATIENT
Start: 2019-12-11 | End: 2019-12-13 | Stop reason: HOSPADM

## 2019-12-11 RX ORDER — SODIUM CHLORIDE 0.9 % (FLUSH) 0.9 %
10 SYRINGE (ML) INJECTION AS NEEDED
Status: DISCONTINUED | OUTPATIENT
Start: 2019-12-11 | End: 2019-12-13 | Stop reason: HOSPADM

## 2019-12-11 RX ORDER — BISACODYL 5 MG/1
5 TABLET, DELAYED RELEASE ORAL DAILY PRN
Status: DISCONTINUED | OUTPATIENT
Start: 2019-12-11 | End: 2019-12-13 | Stop reason: HOSPADM

## 2019-12-11 RX ORDER — HYDROCODONE BITARTRATE AND ACETAMINOPHEN 7.5; 325 MG/1; MG/1
1 TABLET ORAL EVERY 6 HOURS PRN
Status: DISCONTINUED | OUTPATIENT
Start: 2019-12-11 | End: 2019-12-13 | Stop reason: HOSPADM

## 2019-12-11 RX ORDER — ALPRAZOLAM 0.5 MG/1
0.5 TABLET ORAL 3 TIMES DAILY PRN
Status: DISCONTINUED | OUTPATIENT
Start: 2019-12-11 | End: 2019-12-13 | Stop reason: HOSPADM

## 2019-12-11 RX ORDER — HYDROCODONE BITARTRATE AND ACETAMINOPHEN 7.5; 325 MG/1; MG/1
1 TABLET ORAL EVERY 4 HOURS PRN
Status: DISCONTINUED | OUTPATIENT
Start: 2019-12-11 | End: 2019-12-13 | Stop reason: HOSPADM

## 2019-12-11 RX ORDER — HYDROMORPHONE HYDROCHLORIDE 1 MG/ML
1 INJECTION, SOLUTION INTRAMUSCULAR; INTRAVENOUS; SUBCUTANEOUS ONCE
Status: COMPLETED | OUTPATIENT
Start: 2019-12-11 | End: 2019-12-11

## 2019-12-11 RX ORDER — CETIRIZINE HYDROCHLORIDE 10 MG/1
5 TABLET ORAL DAILY
Status: DISCONTINUED | OUTPATIENT
Start: 2019-12-11 | End: 2019-12-13 | Stop reason: HOSPADM

## 2019-12-11 RX ORDER — LIDOCAINE HYDROCHLORIDE 10 MG/ML
10 INJECTION, SOLUTION INFILTRATION; PERINEURAL ONCE
Status: COMPLETED | OUTPATIENT
Start: 2019-12-11 | End: 2019-12-11

## 2019-12-11 RX ORDER — TRAMADOL HYDROCHLORIDE 50 MG/1
50 TABLET ORAL EVERY 6 HOURS PRN
Status: DISCONTINUED | OUTPATIENT
Start: 2019-12-11 | End: 2019-12-13 | Stop reason: HOSPADM

## 2019-12-11 RX ORDER — METFORMIN HYDROCHLORIDE 500 MG/1
500 TABLET, EXTENDED RELEASE ORAL EVERY MORNING
Status: DISCONTINUED | OUTPATIENT
Start: 2019-12-11 | End: 2019-12-13 | Stop reason: HOSPADM

## 2019-12-11 RX ORDER — ONDANSETRON 4 MG/1
4 TABLET, FILM COATED ORAL EVERY 6 HOURS PRN
Status: DISCONTINUED | OUTPATIENT
Start: 2019-12-11 | End: 2019-12-13 | Stop reason: HOSPADM

## 2019-12-11 RX ORDER — CYCLOBENZAPRINE HCL 10 MG
10 TABLET ORAL 3 TIMES DAILY PRN
Status: DISCONTINUED | OUTPATIENT
Start: 2019-12-11 | End: 2019-12-13 | Stop reason: HOSPADM

## 2019-12-11 RX ORDER — HYDROMORPHONE HYDROCHLORIDE 1 MG/ML
0.5 INJECTION, SOLUTION INTRAMUSCULAR; INTRAVENOUS; SUBCUTANEOUS ONCE
Status: COMPLETED | OUTPATIENT
Start: 2019-12-11 | End: 2019-12-11

## 2019-12-11 RX ORDER — ACETAMINOPHEN 650 MG/1
650 SUPPOSITORY RECTAL EVERY 4 HOURS PRN
Status: DISCONTINUED | OUTPATIENT
Start: 2019-12-11 | End: 2019-12-13 | Stop reason: HOSPADM

## 2019-12-11 RX ORDER — SODIUM CHLORIDE 9 MG/ML
75 INJECTION, SOLUTION INTRAVENOUS CONTINUOUS
Status: DISCONTINUED | OUTPATIENT
Start: 2019-12-11 | End: 2019-12-12

## 2019-12-11 RX ORDER — LEVOTHYROXINE AND LIOTHYRONINE 19; 4.5 UG/1; UG/1
15 TABLET ORAL
Status: DISCONTINUED | OUTPATIENT
Start: 2019-12-11 | End: 2019-12-13 | Stop reason: HOSPADM

## 2019-12-11 RX ORDER — ACETAMINOPHEN 325 MG/1
650 TABLET ORAL EVERY 4 HOURS PRN
Status: DISCONTINUED | OUTPATIENT
Start: 2019-12-11 | End: 2019-12-13 | Stop reason: HOSPADM

## 2019-12-11 RX ORDER — ROSUVASTATIN CALCIUM 5 MG/1
5 TABLET, COATED ORAL DAILY
Status: DISCONTINUED | OUTPATIENT
Start: 2019-12-11 | End: 2019-12-13 | Stop reason: HOSPADM

## 2019-12-11 RX ORDER — HYDROMORPHONE HYDROCHLORIDE 1 MG/ML
0.5 INJECTION, SOLUTION INTRAMUSCULAR; INTRAVENOUS; SUBCUTANEOUS
Status: DISCONTINUED | OUTPATIENT
Start: 2019-12-11 | End: 2019-12-13 | Stop reason: HOSPADM

## 2019-12-11 RX ORDER — SODIUM CHLORIDE 0.9 % (FLUSH) 0.9 %
10 SYRINGE (ML) INJECTION EVERY 12 HOURS SCHEDULED
Status: DISCONTINUED | OUTPATIENT
Start: 2019-12-11 | End: 2019-12-13 | Stop reason: HOSPADM

## 2019-12-11 RX ORDER — ONDANSETRON 2 MG/ML
4 INJECTION INTRAMUSCULAR; INTRAVENOUS EVERY 6 HOURS PRN
Status: DISCONTINUED | OUTPATIENT
Start: 2019-12-11 | End: 2019-12-13 | Stop reason: HOSPADM

## 2019-12-11 RX ADMIN — LIDOCAINE HYDROCHLORIDE 2 ML: 10 INJECTION, SOLUTION INFILTRATION; PERINEURAL at 16:24

## 2019-12-11 RX ADMIN — GADOBENATE DIMEGLUMINE 10 ML: 529 INJECTION, SOLUTION INTRAVENOUS at 15:30

## 2019-12-11 RX ADMIN — HYDROCODONE BITARTRATE AND ACETAMINOPHEN 1 TABLET: 7.5; 325 TABLET ORAL at 13:46

## 2019-12-11 RX ADMIN — HYDROMORPHONE HYDROCHLORIDE 0.5 MG: 10 INJECTION INTRAMUSCULAR; INTRAVENOUS; SUBCUTANEOUS at 10:25

## 2019-12-11 RX ADMIN — ALPRAZOLAM 0.5 MG: 0.5 TABLET ORAL at 20:27

## 2019-12-11 RX ADMIN — HYDROMORPHONE HYDROCHLORIDE 0.5 MG: 1 INJECTION, SOLUTION INTRAMUSCULAR; INTRAVENOUS; SUBCUTANEOUS at 03:45

## 2019-12-11 RX ADMIN — HYDROCODONE BITARTRATE AND ACETAMINOPHEN 1 TABLET: 7.5; 325 TABLET ORAL at 08:03

## 2019-12-11 RX ADMIN — SODIUM CHLORIDE 75 ML/HR: 9 INJECTION, SOLUTION INTRAVENOUS at 06:32

## 2019-12-11 RX ADMIN — HYDROMORPHONE HYDROCHLORIDE 0.5 MG: 10 INJECTION INTRAMUSCULAR; INTRAVENOUS; SUBCUTANEOUS at 06:54

## 2019-12-11 RX ADMIN — HYDROMORPHONE HYDROCHLORIDE 0.5 MG: 10 INJECTION INTRAMUSCULAR; INTRAVENOUS; SUBCUTANEOUS at 20:23

## 2019-12-11 RX ADMIN — CYCLOBENZAPRINE 10 MG: 10 TABLET, FILM COATED ORAL at 18:29

## 2019-12-11 RX ADMIN — HYDROCODONE BITARTRATE AND ACETAMINOPHEN 1 TABLET: 7.5; 325 TABLET ORAL at 18:29

## 2019-12-11 RX ADMIN — LEVOTHYROXINE, LIOTHYRONINE 60 MG: 38; 9 TABLET ORAL at 13:46

## 2019-12-11 RX ADMIN — ROSUVASTATIN CALCIUM 5 MG: 5 TABLET, FILM COATED ORAL at 13:45

## 2019-12-11 RX ADMIN — SODIUM CHLORIDE, PRESERVATIVE FREE 10 ML: 5 INJECTION INTRAVENOUS at 06:31

## 2019-12-11 RX ADMIN — CETIRIZINE HYDROCHLORIDE 5 MG: 10 TABLET, FILM COATED ORAL at 13:45

## 2019-12-11 RX ADMIN — HYDROMORPHONE HYDROCHLORIDE 0.5 MG: 10 INJECTION INTRAMUSCULAR; INTRAVENOUS; SUBCUTANEOUS at 15:41

## 2019-12-11 RX ADMIN — BISACODYL 5 MG: 5 TABLET ORAL at 18:29

## 2019-12-11 RX ADMIN — HYDROMORPHONE HYDROCHLORIDE 1 MG: 1 INJECTION, SOLUTION INTRAMUSCULAR; INTRAVENOUS; SUBCUTANEOUS at 01:22

## 2019-12-11 RX ADMIN — SODIUM CHLORIDE, PRESERVATIVE FREE 10 ML: 5 INJECTION INTRAVENOUS at 20:24

## 2019-12-11 RX ADMIN — LEVOTHYROXINE, LIOTHYRONINE 15 MG: 19; 4.5 TABLET ORAL at 13:46

## 2019-12-11 RX ADMIN — HYDROMORPHONE HYDROCHLORIDE 0.5 MG: 10 INJECTION INTRAMUSCULAR; INTRAVENOUS; SUBCUTANEOUS at 23:20

## 2019-12-11 RX ADMIN — METFORMIN HYDROCHLORIDE 500 MG: 500 TABLET, EXTENDED RELEASE ORAL at 13:45

## 2019-12-11 RX ADMIN — IOPAMIDOL 15 ML: 408 INJECTION, SOLUTION INTRATHECAL at 16:31

## 2019-12-11 NOTE — PLAN OF CARE
Lortab for pain. IV Dilaudid given once. MRI of lumbar and CT myelogram being done at this time. Up ad edie. Surgeon (Serafin Baxter) does not come to [facility]. Lumbar surgery for herniated disk in Oct 2019. Eating, drinking, and voiding. Took a shower. Requested stool softener.

## 2019-12-11 NOTE — H&P
HISTORY AND PHYSICAL   [facility]        Patient Identification:  Name: Katia Puente  Age: 49 y.o.  Sex: female  :  1970  MRN: [id number]                     Primary Care Physician: Rene Rivera MD    Chief Complaint:  Back and right leg pain    History of Present Illness:          The patient is a 49-year-old white female with history of degenerative disc disease of the spine, allergies, hypothyroidism and history of having recent lumbar spine surgery several weeks ago who presents the ER with worsening back pain and pain with sciatica pain radiating down the right leg with some right groin pain and numbness and extreme difficulty walking.  The patient was evaluated in the ER and given some pain medication admitted for further evaluation treatment of her symptoms.    Past Medical History:  Past Medical History:   Diagnosis Date   • Allergic    • Breast pain, left    • Elevated cholesterol    • Hemorrhoids 2012   • Hypothyroidism    • Murmur    • Vaginal atrophy      Past Surgical History:  Past Surgical History:   Procedure Laterality Date   • BACK SURGERY      L4, L5 discectomy   • BREAST SURGERY     • CHOLECYSTECTOMY     • ENDOSCOPY  2013   • GALLBLADDER SURGERY     • HEMORRHOIDECTOMY     • HYSTERECTOMY     • LASIK     • TONSILLECTOMY        Home Meds:  Medications Prior to Admission   Medication Sig Dispense Refill Last Dose   • ALPRAZolam (XANAX) 0.5 MG tablet    Past Week at Unknown time   • ARMOUR THYROID 15 MG tablet    12/10/2019 at Unknown time   • ARMOUR THYROID 60 MG PO tablet 60 mg.   12/10/2019 at Unknown time   • cephalexin (KEFLEX) 500 MG capsule Take 1 capsule by mouth 2 (Two) Times a Day. 14 capsule 0 12/10/2019 at Unknown time   • cetirizine (zyrTEC) 5 MG tablet Take 5 mg by mouth.   12/10/2019 at Unknown time   • cyclobenzaprine (FLEXERIL) 10 MG tablet TAKE 1 TABLET BY MOUTH 3 (THREE) TIMES DAILY AS NEEDED FOR MUSCLE SPASMS FOR UP TO 14 DAYS.  0  Past Week at Unknown time   • Fish Oil-Cholecalciferol (FISH OIL + D3 PO) Take  by mouth.   12/10/2019 at Unknown time   • GRALISE 300 MG tablet TAKE 1 TABLET BY MOUTH AT BEDTIME FOR 7 DAYS, THEN TAKE 2 TABLETS...  (REFER TO PRESCRIPTION NOTES).  0 12/10/2019 at Unknown time   • HYDROcodone-acetaminophen (NORCO) 7.5-325 MG per tablet Take 1 tablet by mouth Every 6 (Six) Hours As Needed for Severe Pain . 8 tablet 0 12/10/2019 at Unknown time   • IRON PO Take  by mouth.   Past Week at Unknown time   • metFORMIN ER (GLUCOPHAGE-XR) 500 MG 24 hr tablet Take 500 mg by mouth Every Morning.  0 12/10/2019 at Unknown time   • Ped Multivitamins-Fl-Iron (MULTIVITAMIN WITH FLUORIDE/IRON) 0.25-10 MG/ML solution solution Take  by mouth daily.   Past Week at Unknown time   • progesterone (PROMETRIUM) 200 MG capsule Take 200 mg by mouth Daily.   Patient Taking Differently at Unknown time   • rosuvastatin (CRESTOR) 5 MG tablet    Past Week at Unknown time   • traMADol (ULTRAM) 50 MG tablet Take 1 tablet by mouth Every 6 (Six) Hours As Needed for Moderate Pain . 30 tablet 0 12/10/2019 at Unknown time   • VYVANSE 50 MG capsule Take 50 mg by mouth Daily  0 12/10/2019 at Unknown time   • amoxicillin-clavulanate (AUGMENTIN) 875-125 MG per tablet Take 1 tablet by mouth Every 12 (Twelve) Hours. 14 tablet 0 More than a month at Unknown time   • celecoxib (CELEBREX) 200 MG capsule Take 1 capsule by mouth Daily. (Patient not taking: Reported on 12/11/2019) 30 capsule 5 Not Taking at Unknown time   • clotrimazole-betamethasone (LOTRISONE) 1-0.05 % cream Apply to affected area twice daily 7 days, then 3 times a week 45 g 6 Unknown at Unknown time   • dexamethasone (DECADRON) 4 MG tablet Take 2 tablets by mouth Take As Directed. Take both tablets by mouth 2 hours before myelogram 2 tablet 0 Unknown at Unknown time   • nystatin (MYCOSTATIN) 741924 UNIT/GM ointment 1 APPLICATION TO AFFECTED SKIN TOPICALLY 3 TIMES PER DAY FOR 7 DAY(S)  0 Unknown at  Unknown time     Current meds    Current Facility-Administered Medications:   •  acetaminophen (TYLENOL) tablet 650 mg, 650 mg, Oral, Q4H PRN **OR** acetaminophen (TYLENOL) 160 MG/5ML solution 650 mg, 650 mg, Oral, Q4H PRN **OR** acetaminophen (TYLENOL) suppository 650 mg, 650 mg, Rectal, Q4H PRN, Asuncion Headley APRN  •  bisacodyl (DULCOLAX) EC tablet 5 mg, 5 mg, Oral, Daily PRN, Asuncion Headley APRN  •  calcium carbonate (TUMS) chewable tablet 500 mg (200 mg elemental), 2 tablet, Oral, BID PRN, Asuncion Headley APRN  •  HYDROcodone-acetaminophen (NORCO) 7.5-325 MG per tablet 1 tablet, 1 tablet, Oral, Q4H PRN, Jagjit Sánchez MD, 1 tablet at 12/11/19 0803  •  HYDROmorphone (DILAUDID) injection 0.5 mg, 0.5 mg, Intravenous, Q2H PRN, Jagjit Sánchez MD, 0.5 mg at 12/11/19 1025  •  ondansetron (ZOFRAN) tablet 4 mg, 4 mg, Oral, Q6H PRN **OR** ondansetron (ZOFRAN) injection 4 mg, 4 mg, Intravenous, Q6H PRN, Asuncion Headley APRN  •  [COMPLETED] Insert peripheral IV, , , Once **AND** sodium chloride 0.9 % flush 10 mL, 10 mL, Intravenous, PRN, Florentin Marcial MD, 10 mL at 12/10/19 2336  •  Insert peripheral IV, , , Once **AND** sodium chloride 0.9 % flush 10 mL, 10 mL, Intravenous, PRN, Edinson Reno III, PA  •  sodium chloride 0.9 % flush 10 mL, 10 mL, Intravenous, Q12H, Asuncion Headley APRN, 10 mL at 12/11/19 0631  •  sodium chloride 0.9 % flush 10 mL, 10 mL, Intravenous, PRN, Asuncion Headley APRN  •  sodium chloride 0.9 % infusion, 75 mL/hr, Intravenous, Continuous, Asuncion Headley, APRN, Last Rate: 75 mL/hr at 12/11/19 1025, 75 mL/hr at 12/11/19 1025  Allergies:  Allergies   Allergen Reactions   • Codeine Nausea And Vomiting   • Erythromycin      Immunizations:    There is no immunization history on file for this patient.  Social History:   Social History     Social History Narrative   • Not on file     Social History     Socioeconomic History   •  "Marital status:      Spouse name: Not on file   • Number of children: Not on file   • Years of education: Not on file   • Highest education level: Not on file   Tobacco Use   • Smoking status: Never Smoker   • Smokeless tobacco: Never Used   Substance and Sexual Activity   • Alcohol use: Yes     Comment: current some day; occ   • Drug use: No   • Sexual activity: Yes     Partners: Male     Birth control/protection: Surgical       Family History:  Family History   Problem Relation Age of Onset   • Heart disease Mother    • Heart disease Father    • Diabetes Paternal Aunt    • Stroke Maternal Grandmother    • Cancer Paternal Grandfather         Review of Systems  See history of present illness and past medical history.  Patient denies headache, dizziness, syncope, falls, trauma, change in vision, change in hearing, change in taste, changes in weight, changes in appetite, or paresthesia.  Patient denies chest pain, palpitations, dyspnea, orthopnea, PND, cough, sinus pressure, rhinorrhea, epistaxis, hemoptysis, nausea, vomiting, hematemesis, diarrhea, constipation or hematchezia.  Denies cold or heat intolerance, polydipsia, polyuria, polyphagia. Denies hematuria, pyuria, dysuria, hesitancy, frequency or urgency.  Denies fever, chills, sweats, night sweats.  Denies missing any routine medications. Remainder of ROS is negative.    Objective:  tMax 24 hrs: Temp (24hrs), Av.3 °F (36.3 °C), Min:96.9 °F (36.1 °C), Max:97.8 °F (36.6 °C)    Vitals Ranges:   Temp:  [96.9 °F (36.1 °C)-97.8 °F (36.6 °C)] 97.2 °F (36.2 °C)  Heart Rate:  [72-93] 87  Resp:  [16-18] 16  BP: (124-149)/(80-99) 131/88      Exam:  /88 (BP Location: Left arm, Patient Position: Lying)   Pulse 87   Temp 97.2 °F (36.2 °C) (Oral)   Resp 16   Ht 154.9 cm (60.98\")   Wt 54.1 kg (119 lb 3.2 oz)   SpO2 100%   Breastfeeding No   BMI 22.53 kg/m²     General Appearance:    Alert, cooperative, no distress, appears stated age   Head:    " Normocephalic, without obvious abnormality, atraumatic   Eyes:    PERRL, conjunctiva/corneas clear, EOM's intact, both eyes   Ears:    Normal external ear canals, both ears   Nose:   Nares normal, septum midline, mucosa normal, no drainage    or sinus tenderness   Throat:   Lips, mucosa, and tongue normal   Neck:   Supple, symmetrical, trachea midline, no adenopathy;     thyroid:  no enlargement/tenderness/nodules; no carotid    bruit or JVD   Back:     Symmetric, no curvature, ROM normal, no CVA tenderness   Lungs:     Clear to auscultation bilaterally, respirations unlabored   Chest Wall:    No tenderness or deformity    Heart:    Regular rate and rhythm, S1 and S2 normal, no murmur, rub   or gallop   Abdomen:     Soft, non-tender, bowel sounds active all four quadrants,     no masses, no hepatomegaly, no splenomegaly   Extremities:   Extremities normal, atraumatic, no cyanosis or edema   Pulses:   2+ and symmetric all extremities   Skin:   Skin color, texture, turgor normal, no rashes or lesions   Lymph nodes:   Cervical, supraclavicular, and axillary nodes normal   Neurologic:   CNII-XII intact, right leg weakness, sensation intact throughout      .    Data Review:  Lab Results (last 72 hours)     Procedure Component Value Units Date/Time    Basic Metabolic Panel [[id number]]  (Normal) Collected:  12/11/19 0618    Specimen:  Blood Updated:  12/11/19 0704     Glucose 94 mg/dL      BUN 11 mg/dL      Creatinine 0.65 mg/dL      Sodium 140 mmol/L      Potassium 4.0 mmol/L      Chloride 104 mmol/L      CO2 25.6 mmol/L      Calcium 8.9 mg/dL      eGFR Non African Amer 97 mL/min/1.73      BUN/Creatinine Ratio 16.9     Anion Gap 10.4 mmol/L     Narrative:       GFR Normal >60  Chronic Kidney Disease <60  Kidney Failure <15      CBC (No Diff) [[id number]]  (Normal) Collected:  12/11/19 0618    Specimen:  Blood Updated:  12/11/19 0633     WBC 7.03 10*3/mm3      RBC 4.22 10*6/mm3      Hemoglobin 13.1 g/dL      Hematocrit 39.3  %      MCV 93.1 fL      MCH 31.0 pg      MCHC 33.3 g/dL      RDW 12.5 %      RDW-SD 42.4 fl      MPV 9.6 fL      Platelets 420 10*3/mm3     Urinalysis With Microscopic If Indicated (No Culture) - Urine, Clean Catch [[id number]]  (Normal) Collected:  12/11/19 0408    Specimen:  Urine, Clean Catch Updated:  12/11/19 0429     Color, UA Yellow     Appearance, UA Clear     pH, UA 6.0     Specific Gravity, UA 1.013     Glucose, UA Negative     Ketones, UA Negative     Bilirubin, UA Negative     Blood, UA Negative     Protein, UA Negative     Leuk Esterase, UA Negative     Nitrite, UA Negative     Urobilinogen, UA 0.2 E.U./dL    Narrative:       Urine microscopic not indicated.    CBC & Differential [[id number]] Collected:  12/10/19 2347    Specimen:  Blood Updated:  12/11/19 0241    Narrative:       The following orders were created for panel order CBC & Differential.  Procedure                               Abnormality         Status                     ---------                               -----------         ------                     CBC Auto Differential[[id number]]        Abnormal            Final result                 Please view results for these tests on the individual orders.    CBC Auto Differential [[id number]]  (Abnormal) Collected:  12/10/19 2347    Specimen:  Blood Updated:  12/11/19 0241     WBC 7.22 10*3/mm3      RBC 4.54 10*6/mm3      Hemoglobin 14.2 g/dL      Hematocrit 42.5 %      MCV 93.6 fL      MCH 31.3 pg      MCHC 33.4 g/dL      RDW 12.3 %      RDW-SD 42.3 fl      MPV 10.7 fL      Platelets 490 10*3/mm3      Neutrophil % 53.0 %      Lymphocyte % 29.8 %      Monocyte % 10.9 %      Eosinophil % 5.3 %      Basophil % 0.7 %      Immature Grans % 0.3 %      Neutrophils, Absolute 3.83 10*3/mm3      Lymphocytes, Absolute 2.15 10*3/mm3      Monocytes, Absolute 0.79 10*3/mm3      Eosinophils, Absolute 0.38 10*3/mm3      Basophils, Absolute 0.05 10*3/mm3      Immature Grans, Absolute 0.02 10*3/mm3      nRBC  0.0 /100 WBC     Basic Metabolic Panel [[id number]]  (Normal) Collected:  12/10/19 2347    Specimen:  Blood Updated:  12/11/19 0229     Glucose 77 mg/dL      BUN 14 mg/dL      Creatinine 0.60 mg/dL      Sodium 140 mmol/L      Potassium 4.2 mmol/L      Chloride 103 mmol/L      CO2 27.4 mmol/L      Calcium 9.2 mg/dL      eGFR Non African Amer 106 mL/min/1.73      BUN/Creatinine Ratio 23.3     Anion Gap 9.6 mmol/L     Narrative:       GFR Normal >60  Chronic Kidney Disease <60  Kidney Failure <15      hCG, Serum, Qualitative [[id number]]  (Normal) Collected:  12/10/19 2347    Specimen:  Blood Updated:  12/11/19 0224     HCG Qualitative Negative    [place] [facility] [[id number]] Collected:  12/10/19 2347    Specimen:  Blood Updated:  12/11/19 0100    Narrative:       The following orders were created for panel order [place] [facility].  Procedure                               Abnormality         Status                     ---------                               -----------         ------                     Light Blue Top[[id number]]                                   Final result               Green Top (Gel)[[id number]]                                  Final result               Lavender Top[[id number]]                                     Final result               Gold Top - SST[[id number]]                                   Final result                 Please view results for these tests on the individual orders.    Green Top (Gel) [[id number]] Collected:  12/10/19 2347    Specimen:  Blood Updated:  12/11/19 0100     Extra Tube Hold for add-ons.     Comment: Auto resulted.       Gold Top - SST [[id number]] Collected:  12/10/19 2347    Specimen:  Blood Updated:  12/11/19 0100     Extra Tube Hold for add-ons.     Comment: Auto resulted.       Light Blue Top [[id number]] Collected:  12/10/19 2347    Specimen:  Blood Updated:  12/11/19 0100     Extra Tube hold for add-on     Comment: Auto resulted       Lavender Top [[id number]] Collected:   12/10/19 2347    Specimen:  Blood Updated:  12/11/19 0100     Extra Tube hold for add-on     Comment: Auto resulted                      Imaging Results (All)     None        Past Medical History:   Diagnosis Date   • Allergic    • Breast pain, left    • Elevated cholesterol    • Hemorrhoids 05/02/2012   • Hypothyroidism    • Murmur    • Vaginal atrophy        Assessment:  Active Hospital Problems    Diagnosis  POA   • **Intractable pain [R52]  Yes   • Acute midline low back pain with right-sided sciatica [M54.41]  Unknown   • Hypothyroidism [E03.9]  Yes      Resolved Hospital Problems   No resolved problems to display.       Plan:  The patient admitted to the hospital and will get MRI of lumbar spine with and without contrast and myelogram with CT myelogram imaging per request from her spine surgeon.  I have ordered medication for pain and will get follow-up lab data.    Ricky Obregon MD  12/11/2019  10:32 AM

## 2019-12-11 NOTE — ED NOTES
Nursing report ED to floor  Katia Puente  49 y.o.  female    HPI (triage note):   Chief Complaint   Patient presents with   • Back Pain       Admitting doctor:   Jagjit Sánchez MD    Admitting diagnosis:   The primary encounter diagnosis was Intractable pain. A diagnosis of Acute midline low back pain with right-sided sciatica was also pertinent to this visit.    Code status:   Current Code Status     Date Active Code Status Order ID Comments User Context       12/11/2019 0240 CPR [id number]  Asuncion Headley APRN ED       Questions for Current Code Status     Question Answer Comment    Code Status CPR     Medical Interventions (Level of Support Prior to Arrest) Full           Allergies:   Codeine and Erythromycin    Weight:       12/11/19  0401   Weight: 53.5 kg (118 lb)       Most recent vitals:   Vitals:    12/11/19 0000 12/11/19 0030 12/11/19 0217 12/11/19 0401   BP: 149/94 124/91  137/97   BP Location:    Right arm   Patient Position:    Lying   Pulse: 72 87  88   Resp:    16   Temp:       TempSrc:       SpO2: 99% 97% 98% 96%   Weight:    53.5 kg (118 lb)   Height:           Active LDAs/IV Access:   Lines, Drains & Airways    Active LDAs     Name:   Placement date:   Placement time:   Site:   Days:    Peripheral IV 12/10/19 2335 Right Antecubital   12/10/19    2335    Antecubital   less than 1                Labs (abnormal labs have a star):   Labs Reviewed   CBC WITH AUTO DIFFERENTIAL - Abnormal; Notable for the following components:       Result Value    Platelets 490 (*)     All other components within normal limits   BASIC METABOLIC PANEL - Normal    Narrative:     GFR Normal >60  Chronic Kidney Disease <60  Kidney Failure <15     HCG, SERUM, QUALITATIVE - Normal   RAINBOW DRAW    Narrative:     The following orders were created for panel order [place] Draw.  Procedure                               Abnormality         Status                     ---------                                -----------         ------                     Light Blue Top[[id number]]                                   Final result               Green Top (Gel)[[id number]]                                  Final result               Lavender Top[[id number]]                                     Final result               Gold Top - SST[[id number]]                                   Final result                 Please view results for these tests on the individual orders.   URINALYSIS W/ MICROSCOPIC IF INDICATED (NO CULTURE)   BASIC METABOLIC PANEL   CBC (NO DIFF)   LIGHT BLUE TOP   GREEN TOP   LAVENDER TOP   GOLD TOP - SST   CBC AND DIFFERENTIAL    Narrative:     The following orders were created for panel order CBC & Differential.  Procedure                               Abnormality         Status                     ---------                               -----------         ------                     CBC Auto Differential[[id number]]        Abnormal            Final result                 Please view results for these tests on the individual orders.       EKG:   No orders to display       Meds given in ED:   Medications   sodium chloride 0.9 % flush 10 mL (10 mL Intravenous Given 12/10/19 2336)   sodium chloride 0.9 % flush 10 mL (has no administration in time range)   sodium chloride 0.9 % flush 10 mL (has no administration in time range)   sodium chloride 0.9 % flush 10 mL (has no administration in time range)   sodium chloride 0.9 % infusion (has no administration in time range)   acetaminophen (TYLENOL) tablet 650 mg (has no administration in time range)     Or   acetaminophen (TYLENOL) 160 MG/5ML solution 650 mg (has no administration in time range)     Or   acetaminophen (TYLENOL) suppository 650 mg (has no administration in time range)   bisacodyl (DULCOLAX) EC tablet 5 mg (has no administration in time range)   ondansetron (ZOFRAN) tablet 4 mg (has no administration in time range)     Or   ondansetron (ZOFRAN) injection 4  mg (has no administration in time range)   calcium carbonate (TUMS) chewable tablet 500 mg (200 mg elemental) (has no administration in time range)   sodium chloride 0.9 % bolus 1,000 mL (0 mL Intravenous Stopped 12/11/19 0100)   HYDROmorphone PF (DILAUDID) injection 1 mg (1 mg Intravenous Given 12/10/19 2343)   ondansetron (ZOFRAN) injection 4 mg (4 mg Intravenous Given 12/10/19 2342)   HYDROmorphone PF (DILAUDID) injection 1 mg (1 mg Intravenous Given 12/11/19 0122)   HYDROmorphone (DILAUDID) injection 0.5 mg (0.5 mg Intravenous Given 12/11/19 0345)       Imaging results:  No radiology results for the last day    Ambulatory status:   - ASSIST    Social issues:   Social History     Socioeconomic History   • Marital status:      Spouse name: Not on file   • Number of children: Not on file   • Years of education: Not on file   • Highest education level: Not on file   Tobacco Use   • Smoking status: Never Smoker   • Smokeless tobacco: Never Used   Substance and Sexual Activity   • Alcohol use: Yes     Comment: current some day; occ   • Drug use: No   • Sexual activity: Yes     Partners: Male     Birth control/protection: Surgical          Nora Oliver, RN  12/11/19 9745

## 2019-12-11 NOTE — ED TRIAGE NOTES
Pt reports chronic back pain that has become worse this week. Pt reports she is supposed to have a fusion, but needs a CT scan first. Pt reports she is having difficulty walking and emptying her bladder and bowel. Pt reports pain 5/10 when sitting and 10/10 walking or sleeping.

## 2019-12-11 NOTE — NURSING NOTE
Called Floor nurseMalissa to inform about holding metformin for 48 hours after receiving myelogram contrast and pain medication received while in xray.

## 2019-12-11 NOTE — ED PROVIDER NOTES
" EMERGENCY DEPARTMENT ENCOUNTER    CHIEF COMPLAINT  Chief Complaint: back pain  History given by: pt  History limited by: nothing  Room Number: 20/20  PMD: Rene Rivera MD      HPI:  Pt is a 49 y.o. female who presents to the ED via private vehicle complaining of constant, worsening, chronic back pain, worsening over the past few weeks. She describes the pain as a \"hot knife stabbing in my back.\" The pain has begun to radiate into her groin, and has difficulty ambulating secondary to pain. Also c/o difficulty with bowel movements and difficulty urinating over the past week, secondary to back pain. Pt has been taking Norco with relief for short amount of time, then returns. Pt did see her PCP yesterday, and had XR of back, showing collapsed laminectomy (performed 6 weeks ago by Dr. Carrasco), but has been unable to get scheduled for MRI for further evaluation. Family at bedside.    MEDICAL RECORD REVIEW    Pt was seen here on 12/8/19 for lower back pain, seroma to laminectomy site, and UTI. Pt was discharged home with Dorr and Keflex for UTI. She was instructed to follow up with PCP.     Pt saw her PCP yesterday, and was given Augmentin for \"post operative seroma of subcutaneous tissue after non-dermatologic procedure\" and Diflucan for pain management. She was given instructions to follow up with surgeon.    Pt had a laminectomy and discectomy on 10/24 by Dr. Carrasco.    PAST MEDICAL HISTORY  Active Ambulatory Problems     Diagnosis Date Noted   • Hemorrhoids 05/02/2012   • Hypothyroidism    • Stress disorder, acute 11/09/2015   • ADD (attention deficit disorder) 09/08/2016   • Postmenopausal HRT (hormone replacement therapy) 09/08/2016   • Radiculopathy of leg 05/23/2018   • Subacute vulvitis 05/17/2019   • History of total vaginal hysterectomy (TVH) 05/17/2019     Resolved Ambulatory Problems     Diagnosis Date Noted   • No Resolved Ambulatory Problems     Past Medical History:   Diagnosis Date   • Allergic    • " Breast pain, left    • Elevated cholesterol    • Murmur    • Vaginal atrophy        PAST SURGICAL HISTORY  Past Surgical History:   Procedure Laterality Date   • BACK SURGERY      L4, L5 discectomy   • BREAST SURGERY     • CHOLECYSTECTOMY     • ENDOSCOPY  01/2013   • GALLBLADDER SURGERY  2016   • HEMORRHOIDECTOMY     • HYSTERECTOMY  2008   • LASIK  1998   • TONSILLECTOMY         FAMILY HISTORY  Family History   Problem Relation Age of Onset   • Heart disease Mother    • Heart disease Father    • Diabetes Paternal Aunt    • Stroke Maternal Grandmother    • Cancer Paternal Grandfather        SOCIAL HISTORY  Social History     Socioeconomic History   • Marital status:      Spouse name: Not on file   • Number of children: Not on file   • Years of education: Not on file   • Highest education level: Not on file   Tobacco Use   • Smoking status: Never Smoker   • Smokeless tobacco: Never Used   Substance and Sexual Activity   • Alcohol use: Yes     Comment: current some day; occ   • Drug use: No   • Sexual activity: Yes     Partners: Male     Birth control/protection: Surgical       ALLERGIES  Codeine and Erythromycin    REVIEW OF SYSTEMS  Review of Systems   Gastrointestinal:        (+) difficulty with bowel movments   Genitourinary: Positive for difficulty urinating.   Musculoskeletal: Positive for back pain (radiating into groin).     All systems reviewed and negative except for those discussed in HPI.    PHYSICAL EXAM  ED Triage Vitals   Temp Heart Rate Resp BP SpO2   12/10/19 2131 12/10/19 2129 12/10/19 2130 12/10/19 2134 12/10/19 2129   97.8 °F (36.6 °C) 93 16 127/80 99 %      Temp src Heart Rate Source Patient Position BP Location FiO2 (%)   12/10/19 2131 -- -- -- --   Tympanic           Physical Exam   Constitutional: She is oriented to person, place, and time. No distress.   HENT:   Head: Normocephalic and atraumatic.   Eyes: Pupils are equal, round, and reactive to light. EOM are normal.   Neck: Normal  range of motion. Neck supple.   Cardiovascular: Normal rate, regular rhythm and normal heart sounds.   Pulmonary/Chest: Effort normal and breath sounds normal. No respiratory distress.   Abdominal: Soft. There is no tenderness. There is no rebound and no guarding.   Musculoskeletal: Normal range of motion. She exhibits no edema.   Pain with slight flexion and extension of trunk.   Neurological: She is alert and oriented to person, place, and time. She has normal sensation and normal strength. She has an abnormal Straight Leg Raise Test (bilat).   Skin: Skin is warm and dry. No rash noted.   2 5 cm well healed incisions along the lower lumbar region, TTP. No signs of secondary infection, without drainage or erythema.   Psychiatric: Mood and affect normal.   Nursing note and vitals reviewed.      LAB RESULTS  Lab Results (last 24 hours)     Procedure Component Value Units Date/Time    CBC & Differential [[id number]] Collected:  12/10/19 2347    Specimen:  Blood Updated:  12/11/19 0241    Narrative:       The following orders were created for panel order CBC & Differential.  Procedure                               Abnormality         Status                     ---------                               -----------         ------                     CBC Auto Differential[[id number]]        Abnormal            Final result                 Please view results for these tests on the individual orders.    Basic Metabolic Panel [[id number]]  (Normal) Collected:  12/10/19 2347    Specimen:  Blood Updated:  12/11/19 0229     Glucose 77 mg/dL      BUN 14 mg/dL      Creatinine 0.60 mg/dL      Sodium 140 mmol/L      Potassium 4.2 mmol/L      Chloride 103 mmol/L      CO2 27.4 mmol/L      Calcium 9.2 mg/dL      eGFR Non African Amer 106 mL/min/1.73      BUN/Creatinine Ratio 23.3     Anion Gap 9.6 mmol/L     Narrative:       GFR Normal >60  Chronic Kidney Disease <60  Kidney Failure <15      hCG, Serum, Qualitative [[id number]]  (Normal)  Collected:  12/10/19 2347    Specimen:  Blood Updated:  12/11/19 0224     HCG Qualitative Negative    CBC Auto Differential [[id number]]  (Abnormal) Collected:  12/10/19 2347    Specimen:  Blood Updated:  12/11/19 0241     WBC 7.22 10*3/mm3      RBC 4.54 10*6/mm3      Hemoglobin 14.2 g/dL      Hematocrit 42.5 %      MCV 93.6 fL      MCH 31.3 pg      MCHC 33.4 g/dL      RDW 12.3 %      RDW-SD 42.3 fl      MPV 10.7 fL      Platelets 490 10*3/mm3      Neutrophil % 53.0 %      Lymphocyte % 29.8 %      Monocyte % 10.9 %      Eosinophil % 5.3 %      Basophil % 0.7 %      Immature Grans % 0.3 %      Neutrophils, Absolute 3.83 10*3/mm3      Lymphocytes, Absolute 2.15 10*3/mm3      Monocytes, Absolute 0.79 10*3/mm3      Eosinophils, Absolute 0.38 10*3/mm3      Basophils, Absolute 0.05 10*3/mm3      Immature Grans, Absolute 0.02 10*3/mm3      nRBC 0.0 /100 WBC     Urinalysis With Microscopic If Indicated (No Culture) - Urine, Clean Catch [[id number]]  (Normal) Collected:  12/11/19 0408    Specimen:  Urine, Clean Catch Updated:  12/11/19 0429     Color, UA Yellow     Appearance, UA Clear     pH, UA 6.0     Specific Gravity, UA 1.013     Glucose, UA Negative     Ketones, UA Negative     Bilirubin, UA Negative     Blood, UA Negative     Protein, UA Negative     Leuk Esterase, UA Negative     Nitrite, UA Negative     Urobilinogen, UA 0.2 E.U./dL    Narrative:       Urine microscopic not indicated.          I ordered the above labs and reviewed the results.    PROGRESS AND CONSULTS    ED Course as of Dec 11 0500   Wed Dec 11, 2019   0001 Limited bedside bladder ultrasound evaluated by me.  Measurements taken per protocol.  Total volume is 71 ml.  RADHA Reno PA-C informed.     [EP]      ED Course User Index  [EP] Meghana Lenora Beyere, APRN     2311- HR 86. O2 sat 99% on RA. /98. Discussed w/ pt and family plan to check labs and bladder scanner, as well as treat pain w/ IVF, Zofran, and Dilaudid. Pt understands and agrees with the  plan, all questions answered. Ordered labs and bladder scanner for further evaluation. Also ordered IVF, Zofran, and Dilaudid for sx management.    2325- Reviewed pt's history and workup with Dr. Marcial.  After a bedside evaluation, Dr. Marcial agrees with the plan of care.    0001- Bladder scanner reads 71, per OLGA Kowalski. She reports pt is requesting more pain medicine. Ordered another Dilaudid for pain relief.    0045- Per RN, pt ambulated to bathroom without difficulty.    0204- Pt complaining of more pain. Placed call out to [facility] for admission for pain control and further evaluation.    0234- Talked to OLGA Headley for Dr. Sánchez, [facility], who agrees w/ plan of care and agrees to admit pt for further evaluation.    0317- Per RN, pt is requesting more pain medicine. Ordered another Dilaudid.    MEDICAL DECISION MAKING  Results were reviewed/discussed with the patient and they were also made aware of online access. Pt also made aware that some labs, such as cultures, will not be resulted during ER visit and follow up with PMD is necessary.          DIAGNOSIS  Final diagnoses:   Intractable pain   Acute midline low back pain with right-sided sciatica       DISPOSITION  ADMISSION    Discussed treatment plan and reason for admission with pt/family and admitting physician.  Pt/family voiced understanding of the plan for admission for further testing/treatment as needed.         Latest Documented Vital Signs:  As of 5:00 AM  BP- 137/97 HR- 88 Temp- 97.8 °F (36.6 °C) (Tympanic) O2 sat- 96%    --  Documentation assistance provided by lurdes Jacobson for Abhishek Reno PA-C.  Information recorded by the lurdes was done at my direction and has been verified and validated by me.     Yasmine Jacobson  12/11/19 1350       Edinson Reno III, PA  12/11/19 [id number]

## 2019-12-12 ENCOUNTER — APPOINTMENT (OUTPATIENT)
Dept: BONE DENSITY | Facility: HOSPITAL | Age: 49
End: 2019-12-12

## 2019-12-12 LAB
ANION GAP SERPL CALCULATED.3IONS-SCNC: 8.2 MMOL/L (ref 5–15)
BASOPHILS # BLD AUTO: 0.04 10*3/MM3 (ref 0–0.2)
BASOPHILS NFR BLD AUTO: 0.6 % (ref 0–1.5)
BUN BLD-MCNC: 10 MG/DL (ref 6–20)
BUN/CREAT SERPL: 15.6 (ref 7–25)
CALCIUM SPEC-SCNC: 8.9 MG/DL (ref 8.6–10.5)
CHLORIDE SERPL-SCNC: 103 MMOL/L (ref 98–107)
CO2 SERPL-SCNC: 26.8 MMOL/L (ref 22–29)
CREAT BLD-MCNC: 0.64 MG/DL (ref 0.57–1)
DEPRECATED RDW RBC AUTO: 42.2 FL (ref 37–54)
EOSINOPHIL # BLD AUTO: 0.36 10*3/MM3 (ref 0–0.4)
EOSINOPHIL NFR BLD AUTO: 5.3 % (ref 0.3–6.2)
ERYTHROCYTE [DISTWIDTH] IN BLOOD BY AUTOMATED COUNT: 12.1 % (ref 12.3–15.4)
GFR SERPL CREATININE-BSD FRML MDRD: 99 ML/MIN/1.73
GLUCOSE BLD-MCNC: 102 MG/DL (ref 65–99)
HCT VFR BLD AUTO: 38.4 % (ref 34–46.6)
HGB BLD-MCNC: 12.9 G/DL (ref 12–15.9)
IMM GRANULOCYTES # BLD AUTO: 0.03 10*3/MM3 (ref 0–0.05)
IMM GRANULOCYTES NFR BLD AUTO: 0.4 % (ref 0–0.5)
LYMPHOCYTES # BLD AUTO: 1.6 10*3/MM3 (ref 0.7–3.1)
LYMPHOCYTES NFR BLD AUTO: 23.8 % (ref 19.6–45.3)
MCH RBC QN AUTO: 31.9 PG (ref 26.6–33)
MCHC RBC AUTO-ENTMCNC: 33.6 G/DL (ref 31.5–35.7)
MCV RBC AUTO: 94.8 FL (ref 79–97)
MONOCYTES # BLD AUTO: 0.79 10*3/MM3 (ref 0.1–0.9)
MONOCYTES NFR BLD AUTO: 11.7 % (ref 5–12)
NEUTROPHILS # BLD AUTO: 3.91 10*3/MM3 (ref 1.7–7)
NEUTROPHILS NFR BLD AUTO: 58.2 % (ref 42.7–76)
NRBC BLD AUTO-RTO: 0 /100 WBC (ref 0–0.2)
PLATELET # BLD AUTO: 383 10*3/MM3 (ref 140–450)
PMV BLD AUTO: 10 FL (ref 6–12)
POTASSIUM BLD-SCNC: 4.3 MMOL/L (ref 3.5–5.2)
RBC # BLD AUTO: 4.05 10*6/MM3 (ref 3.77–5.28)
SODIUM BLD-SCNC: 138 MMOL/L (ref 136–145)
WBC NRBC COR # BLD: 6.73 10*3/MM3 (ref 3.4–10.8)

## 2019-12-12 PROCEDURE — 25010000002 METHYLPREDNISOLONE PER 125 MG: Performed by: HOSPITALIST

## 2019-12-12 PROCEDURE — 99254 IP/OBS CNSLTJ NEW/EST MOD 60: CPT | Performed by: PHYSICIAN ASSISTANT

## 2019-12-12 PROCEDURE — 85025 COMPLETE CBC W/AUTO DIFF WBC: CPT | Performed by: HOSPITALIST

## 2019-12-12 PROCEDURE — 77080 DXA BONE DENSITY AXIAL: CPT

## 2019-12-12 PROCEDURE — 80048 BASIC METABOLIC PNL TOTAL CA: CPT | Performed by: HOSPITALIST

## 2019-12-12 PROCEDURE — 25010000002 HYDROMORPHONE PER 4 MG: Performed by: INTERNAL MEDICINE

## 2019-12-12 RX ORDER — METHYLPREDNISOLONE SODIUM SUCCINATE 125 MG/2ML
80 INJECTION, POWDER, LYOPHILIZED, FOR SOLUTION INTRAMUSCULAR; INTRAVENOUS EVERY 8 HOURS
Status: DISCONTINUED | OUTPATIENT
Start: 2019-12-12 | End: 2019-12-13 | Stop reason: HOSPADM

## 2019-12-12 RX ADMIN — SODIUM CHLORIDE, PRESERVATIVE FREE 10 ML: 5 INJECTION INTRAVENOUS at 22:08

## 2019-12-12 RX ADMIN — HYDROMORPHONE HYDROCHLORIDE 0.5 MG: 10 INJECTION INTRAMUSCULAR; INTRAVENOUS; SUBCUTANEOUS at 01:58

## 2019-12-12 RX ADMIN — ALPRAZOLAM 0.5 MG: 0.5 TABLET ORAL at 22:12

## 2019-12-12 RX ADMIN — HYDROCODONE BITARTRATE AND ACETAMINOPHEN 1 TABLET: 7.5; 325 TABLET ORAL at 03:36

## 2019-12-12 RX ADMIN — CYCLOBENZAPRINE 10 MG: 10 TABLET, FILM COATED ORAL at 22:12

## 2019-12-12 RX ADMIN — HYDROCODONE BITARTRATE AND ACETAMINOPHEN 1 TABLET: 7.5; 325 TABLET ORAL at 09:34

## 2019-12-12 RX ADMIN — LEVOTHYROXINE, LIOTHYRONINE 15 MG: 19; 4.5 TABLET ORAL at 07:30

## 2019-12-12 RX ADMIN — SODIUM CHLORIDE 75 ML/HR: 9 INJECTION, SOLUTION INTRAVENOUS at 03:30

## 2019-12-12 RX ADMIN — HYDROCODONE BITARTRATE AND ACETAMINOPHEN 1 TABLET: 7.5; 325 TABLET ORAL at 16:13

## 2019-12-12 RX ADMIN — HYDROMORPHONE HYDROCHLORIDE 0.5 MG: 10 INJECTION INTRAMUSCULAR; INTRAVENOUS; SUBCUTANEOUS at 19:43

## 2019-12-12 RX ADMIN — HYDROMORPHONE HYDROCHLORIDE 0.5 MG: 10 INJECTION INTRAMUSCULAR; INTRAVENOUS; SUBCUTANEOUS at 08:08

## 2019-12-12 RX ADMIN — TRAMADOL HYDROCHLORIDE 50 MG: 50 TABLET, FILM COATED ORAL at 00:44

## 2019-12-12 RX ADMIN — METHYLPREDNISOLONE SODIUM SUCCINATE 80 MG: 125 INJECTION, POWDER, FOR SOLUTION INTRAMUSCULAR; INTRAVENOUS at 15:56

## 2019-12-12 RX ADMIN — HYDROCODONE BITARTRATE AND ACETAMINOPHEN 1 TABLET: 7.5; 325 TABLET ORAL at 22:13

## 2019-12-12 RX ADMIN — LEVOTHYROXINE, LIOTHYRONINE 60 MG: 38; 9 TABLET ORAL at 07:31

## 2019-12-12 RX ADMIN — ROSUVASTATIN CALCIUM 5 MG: 5 TABLET, FILM COATED ORAL at 08:08

## 2019-12-12 RX ADMIN — CETIRIZINE HYDROCHLORIDE 5 MG: 10 TABLET, FILM COATED ORAL at 08:08

## 2019-12-12 RX ADMIN — HYDROMORPHONE HYDROCHLORIDE 0.5 MG: 10 INJECTION INTRAMUSCULAR; INTRAVENOUS; SUBCUTANEOUS at 16:12

## 2019-12-12 NOTE — CONSULTS
Inpatient Neurosurgery Consult  Consult performed by: Malina Jeffrey PA-C  Consult ordered by: Ricky Obregon MD  Reason for consult: back and R leg pain  Assessment/Recommendations: Ms. Puente is a 49 yr old female with PMH hypothyroidism. She saw Dr. Yu in 2018 for back and L leg pain and discussed surgery but ended up going [place] of [place] and had a L4-5 discectomy in [facility] in 2018. She then developed recurrent back and R leg pain earlier this year. Injections and therapy failed to help. She saw Dr. Martinez in October 2019 as a second opinion after Dr. Carrasco recommended a L4-5 fusion for a recurrent L4-5 disc herniation. Dr. Martinez discussed possible surgery with her and recommended a myelogram. She did not proceed with the myelogram and ended up going back to Dr. Carrasco and had a reop L4-5 microdisectomy 6wks ago. She states that it helped and she did well for about 3wks and then the back and R pelvic/lateral leg pain returned. No bowel or bladder incontinence, no focal weakness.  She has now had a new MRI and myelogram that I have reviewed with Dr. Martinez. It does show some mild stenosis and mild to moderate foraminal narrowing at L4-5.    Her exam does not reveal any focal weakness.    I reviewed the imaging with the patient and her . I explained that there is some foraminal narrowing that could explain her radicular pain. It would not be unreasonable to consider a decompression/fusion at that level. Given that it would be her 3rd lami at that level a fusion would absolutely required. However, given that she is only 6wks out from her recent surgery and there are no emergent needs (no infection, no severe stenosis or cauda equina syndrome, no weakness) we will not assume her care. She will need to call Dr. Carrasco and see when she can get back in to see him. From what the  said Dr. Obregon has spoken with him and he already has a copy of the recent MRI and myelogram to review.     I will S/O.  Call as needed.           Patient Care Team:  Rene Rivera MD as PCP - General  Rene Rivera MD as PCP - Family Medicine  Yasmin Mccall MA as Medical Assistant    Chief complaint:back and R leg pain    Subjective     L spine MRI and myelogram reviewed and discussed with Dr. Martinez. Description above      ..Lab             12/12/19                       0451          WBC          6.73          HEMOGLOBIN   12.9          HEMATOCRIT   38.4          PLATELETS    383             ..Lab             12/12/19                       0451          SODIUM       138           POTASSIUM    4.3           CHLORIDE     103           CO2          26.8          BUN          10            CREATININE   0.64          GLUCOSE      102*          CALCIUM      8.9                 Back Pain   This is a recurrent problem. The current episode started 1 to 4 weeks ago. The problem occurs constantly. The problem is unchanged. The pain is present in the lumbar spine. The pain is at a severity of 8/10. The pain is severe. Associated symptoms include leg pain and pelvic pain. Pertinent negatives include no bladder incontinence, bowel incontinence, numbness, perianal numbness, tingling or weakness. The treatment provided no relief.       Review of Systems   Gastrointestinal: Negative for bowel incontinence.   Genitourinary: Positive for pelvic pain. Negative for bladder incontinence and difficulty urinating.   Musculoskeletal: Positive for back pain.   Neurological: Negative for tingling, weakness and numbness.   All other systems reviewed and are negative.       Past Medical History:   Diagnosis Date   • Allergic    • Breast pain, left    • Elevated cholesterol    • Hemorrhoids 05/02/2012   • Hypothyroidism    • Murmur    • Vaginal atrophy    ,   Past Surgical History:   Procedure Laterality Date   • BACK SURGERY      L4, L5 discectomy   • BREAST SURGERY     • CHOLECYSTECTOMY     • ENDOSCOPY  01/2013   • GALLBLADDER SURGERY  2016   •  HEMORRHOIDECTOMY     • HYSTERECTOMY  2008   • LASIK  1998   • TONSILLECTOMY     ,   Family History   Problem Relation Age of Onset   • Heart disease Mother    • Heart disease Father    • Diabetes Paternal Aunt    • Stroke Maternal Grandmother    • Cancer Paternal Grandfather    ,   Social History     Tobacco Use   • Smoking status: Never Smoker   • Smokeless tobacco: Never Used   Substance Use Topics   • Alcohol use: Yes     Comment: current some day; occ   • Drug use: No   ,   Medications Prior to Admission   Medication Sig Dispense Refill Last Dose   • ALPRAZolam (XANAX) 0.5 MG tablet    Past Week at Unknown time   • ARMOUR THYROID 15 MG tablet    12/10/2019 at Unknown time   • ARMOUR THYROID 60 MG PO tablet 60 mg.   12/10/2019 at Unknown time   • cephalexin (KEFLEX) 500 MG capsule Take 1 capsule by mouth 2 (Two) Times a Day. 14 capsule 0 12/10/2019 at Unknown time   • cetirizine (zyrTEC) 5 MG tablet Take 5 mg by mouth.   12/10/2019 at Unknown time   • cyclobenzaprine (FLEXERIL) 10 MG tablet TAKE 1 TABLET BY MOUTH 3 (THREE) TIMES DAILY AS NEEDED FOR MUSCLE SPASMS FOR UP TO 14 DAYS.  0 Past Week at Unknown time   • Fish Oil-Cholecalciferol (FISH OIL + D3 PO) Take  by mouth.   12/10/2019 at Unknown time   • GRALISE 300 MG tablet TAKE 1 TABLET BY MOUTH AT BEDTIME FOR 7 DAYS, THEN TAKE 2 TABLETS...  (REFER TO PRESCRIPTION NOTES).  0 12/10/2019 at Unknown time   • HYDROcodone-acetaminophen (NORCO) 7.5-325 MG per tablet Take 1 tablet by mouth Every 6 (Six) Hours As Needed for Severe Pain . 8 tablet 0 12/10/2019 at Unknown time   • IRON PO Take  by mouth.   Past Week at Unknown time   • metFORMIN ER (GLUCOPHAGE-XR) 500 MG 24 hr tablet Take 500 mg by mouth Every Morning.  0 12/10/2019 at Unknown time   • Ped Multivitamins-Fl-Iron (MULTIVITAMIN WITH FLUORIDE/IRON) 0.25-10 MG/ML solution solution Take  by mouth daily.   Past Week at Unknown time   • progesterone (PROMETRIUM) 200 MG capsule Take 200 mg by mouth Daily.    Patient Taking Differently at Unknown time   • rosuvastatin (CRESTOR) 5 MG tablet    Past Week at Unknown time   • traMADol (ULTRAM) 50 MG tablet Take 1 tablet by mouth Every 6 (Six) Hours As Needed for Moderate Pain . 30 tablet 0 12/10/2019 at Unknown time   • VYVANSE 50 MG capsule Take 50 mg by mouth Daily  0 12/10/2019 at Unknown time   • amoxicillin-clavulanate (AUGMENTIN) 875-125 MG per tablet Take 1 tablet by mouth Every 12 (Twelve) Hours. 14 tablet 0 More than a month at Unknown time   • celecoxib (CELEBREX) 200 MG capsule Take 1 capsule by mouth Daily. (Patient not taking: Reported on 12/11/2019) 30 capsule 5 Not Taking at Unknown time   • clotrimazole-betamethasone (LOTRISONE) 1-0.05 % cream Apply to affected area twice daily 7 days, then 3 times a week 45 g 6 Unknown at Unknown time   • dexamethasone (DECADRON) 4 MG tablet Take 2 tablets by mouth Take As Directed. Take both tablets by mouth 2 hours before myelogram 2 tablet 0 Unknown at Unknown time   • nystatin (MYCOSTATIN) 181660 UNIT/GM ointment 1 APPLICATION TO AFFECTED SKIN TOPICALLY 3 TIMES PER DAY FOR 7 DAY(S)  0 Unknown at Unknown time   , Scheduled Meds:    cetirizine 5 mg Oral Daily   metFORMIN  mg Oral QAM   methylPREDNISolone sodium succinate 80 mg Intravenous Q8H   progesterone 200 mg Oral Daily   rosuvastatin 5 mg Oral Daily   sodium chloride 10 mL Intravenous Q12H   thyroid 15 mg Oral QAM AC   Thyroid 60 mg Oral QAM AC   , Continuous Infusions:   , PRN Meds:  •  acetaminophen **OR** acetaminophen **OR** acetaminophen  •  ALPRAZolam  •  bisacodyl  •  calcium carbonate  •  cyclobenzaprine  •  HYDROcodone-acetaminophen  •  HYDROcodone-acetaminophen  •  HYDROmorphone  •  ondansetron **OR** ondansetron  •  [COMPLETED] Insert peripheral IV **AND** sodium chloride  •  Insert peripheral IV **AND** sodium chloride  •  sodium chloride  •  traMADol and Allergies:  Codeine and Erythromycin    Objective      Vital Signs  Temp:  [97 °F (36.1  °C)-98.4 °F (36.9 °C)] 97.3 °F (36.3 °C)  Heart Rate:  [61-85] 77  Resp:  [16] 16  BP: (110-138)/(72-82) 138/82    Physical Exam   Constitutional: She is oriented to person, place, and time. She appears well-developed and well-nourished.   HENT:   Head: Normocephalic and atraumatic.   Right Ear: External ear normal.   Left Ear: External ear normal.   Eyes: Pupils are equal, round, and reactive to light. Conjunctivae and EOM are normal. Right eye exhibits no discharge. Left eye exhibits no discharge.   Neck: Normal range of motion. Neck supple. No tracheal deviation present.   Pulmonary/Chest: Effort normal. No stridor. No respiratory distress.   Musculoskeletal: Normal range of motion. She exhibits no edema, tenderness or deformity.   Neurological: She is alert and oriented to person, place, and time. She has normal strength and normal reflexes. She displays no atrophy, no tremor and normal reflexes. No cranial nerve deficit or sensory deficit. She exhibits normal muscle tone. She displays a negative Romberg sign. She displays no seizure activity. Coordination and gait normal.   No long tract signs   Skin: Skin is warm and dry.   Psychiatric: She has a normal mood and affect. Her behavior is normal. Judgment and thought content normal.   Nursing note and vitals reviewed.      Results Review:    I reviewed the patient's new clinical results.  I reviewed the patient's new imaging results and agree with the interpretation.        Assessment/Plan       Intractable pain    Hypothyroidism    Acute midline low back pain with right-sided sciatica      Assessment:  (Recurrent back and leg pain  L4-5 disc protrusion with radiculopathy  2 previous L4-5 lami/discectomies, the last of which was only 6wks ago with Dr. Carrasco  ).     Plan:   (As above.  Needs to return to see Dr. Carrasco to discuss additional surgical options. We will S/O. ).       I discussed the patients findings and my recommendations with patient, family and  nursing staff    Malina Jeffrey PA-C  12/12/19  2:12 PM    Time: 45min

## 2019-12-12 NOTE — PLAN OF CARE
Problem: Patient Care Overview  Goal: Plan of Care Review  Outcome: Ongoing (interventions implemented as appropriate)  Flowsheets (Taken 12/12/2019 0402)  Progress: improving  Plan of Care Reviewed With: patient  Note:   VSS. Medicated with dilaudid PRN, along with ultram and norco x1 for low back pain radiating down R leg. CT myelogram (pending) completed yesterday, site with bandaid and is CDI. MRI lumbar pending. IVF infusing. Tolerating regular diet well. Will continue to monitor.

## 2019-12-12 NOTE — PROGRESS NOTES
"Adult Nutrition  Assessment/PES    Patient Name:  Katia Puente  YOB: 1970  MRN: [id number]  Admit Date:  12/10/2019    Assessment Date:  12/12/2019    Comments: Nutrition Screen: MST 3. Pt with back pain w/ right sided sciatica.  Reports previous decreased appetite prior to admission r/t pain.  Since her pain is improved, her appetite has returned to normal. Eating % PO at meals.  Tolerating a regular diet.     RD to continue to follow as needed.     Reason for Assessment     Row Name 12/12/19 1411          Reason for Assessment    Reason For Assessment  identified at risk by screening criteria     Diagnosis  endocrine conditions;other (see comments) Intractable pain, underactive thyroid, midline low back pain w/ right sided sciatica     Identified At Risk by Screening Criteria  MST SCORE 2+         Nutrition/Diet History     Row Name 12/12/19 1412          Nutrition/Diet History    Typical Food/Fluid Intake  Decreased appetite and PO intake r/t pain.       Factors Affecting Nutritional Intake  pain         Anthropometrics     Row Name 12/12/19 1415 12/12/19 1413       Anthropometrics    Height  154.9 cm (60.98\")  --       Admit Weight    Admit Weight  --  54.1 kg (119 lb 4.3 oz) 12/11       Ideal Body Weight (IBW)    Ideal Body Weight (IBW) (kg)  48.11  --       Body Mass Index (BMI)    BMI Assessment  --  BMI 18.5-24.9: normal        Labs/Tests/Procedures/Meds     Row Name 12/12/19 1414          Labs/Procedures/Meds    Lab Results Reviewed  reviewed        Diagnostic Tests/Procedures    Diagnostic Test/Procedure Reviewed  reviewed     Diagnostic Test/Procedures Comments  CT spine        Medications    Pertinent Medications Reviewed  reviewed         Physical Findings     Row Name 12/12/19 1415          Physical Findings    Skin  -- Marko: 20         Estimated/Assessed Needs     Row Name 12/12/19 1415          Calculation Measurements    Weight Used For Calculations  54.1 kg (119 lb 4.3 " "oz)     Height  154.9 cm (60.98\")        Estimated/Assessed Needs    Additional Documentation  KCAL/KG (Group);Fluid Requirements (Group);Protein Requirements (Group)        KCAL/KG    KCAL/KG  25 Kcal/Kg (kcal);30 Kcal/Kg (kcal)     25 Kcal/Kg (kcal)  1352.5     30 Kcal/Kg (kcal)  1623        Protein Requirements    Weight Used For Protein Calculations  54.1 kg (119 lb 4.3 oz)     Est Protein Requirement Amount (gms/kg)  1.0 gm protein     Estimated Protein Requirements (gms/day)  54.1        Fluid Requirements    Estimated Fluid Requirements (mL/day)  1352     Estimated Fluid Requirement Method  RDA Method     RDA Method (mL)  1352     Daphne-Ilia Method (over 20 kg)  2582         Nutrition Prescription Ordered     Row Name 12/12/19 1416          Nutrition Prescription PO    Current PO Diet  Regular     Fluid Consistency  Thin         Evaluation of Received Nutrient/Fluid Intake     Row Name 12/12/19 1416          PO Evaluation    Number of Meals  3     % PO Intake                 Problem/Interventions:  Problem 1     Row Name 12/12/19 1417          Nutrition Diagnoses Problem 1    Problem 1  Inadequate Nutrient Intake     Etiology (related to)  Factors Affecting Nutrition;Other (comment) Pain     Appetite  Improved     Signs/Symptoms (evidenced by)  Report/Observation;PO Intake     Percent (%) intake recorded  75 %     Over number of meals  2               Intervention Goal     Row Name 12/12/19 1420          Intervention Goal    General  Maintain nutrition;Meet nutritional needs for age/condition     PO  Tolerate PO;PO intake (%)     PO Intake %  75 %     Weight  Maintain weight         Nutrition Intervention     Row Name 12/12/19 1420          Nutrition Intervention    RD/Tech Action  Follow Tx progress;Care plan reviewd;Encourage intake           Education/Evaluation     Row Name 12/12/19 1427          Education    Education  Will Instruct as appropriate        Monitor/Evaluation    Monitor  Per " protocol;PO intake;Weight;Symptoms           Electronically signed by:  Stephanie Archuleta RD  12/12/19 2:21 PM

## 2019-12-12 NOTE — PROGRESS NOTES
"DAILY PROGRESS NOTE  [facility]    Patient Identification:  Name: Katia Puente  Age: 49 y.o.  Sex: female  :  1970  MRN: [id number]         Primary Care Physician: Rene Rivera MD    Subjective:  Interval History:She complains of back pain and right leg pain.    Objective:    Scheduled Meds:  cetirizine 5 mg Oral Daily   metFORMIN  mg Oral QAM   progesterone 200 mg Oral Daily   rosuvastatin 5 mg Oral Daily   sodium chloride 10 mL Intravenous Q12H   thyroid 15 mg Oral QAM AC   Thyroid 60 mg Oral QAM AC     Continuous Infusions:  sodium chloride 75 mL/hr Last Rate: 75 mL/hr (19 0330)       Vital signs in last 24 hours:  Temp:  [97 °F (36.1 °C)-98.4 °F (36.9 °C)] 97.3 °F (36.3 °C)  Heart Rate:  [61-85] 77  Resp:  [16] 16  BP: (110-138)/(72-82) 138/82    Intake/Output:    Intake/Output Summary (Last 24 hours) at 2019 1043  Last data filed at 2019 0915  Gross per 24 hour   Intake 910 ml   Output 2300 ml   Net -1390 ml       Exam:  /82 (BP Location: Right arm, Patient Position: Sitting)   Pulse 77   Temp 97.3 °F (36.3 °C)   Resp 16   Ht 154.9 cm (60.98\")   Wt 54.1 kg (119 lb 3.2 oz)   SpO2 98%   Breastfeeding No   BMI 22.53 kg/m²     General Appearance:    Alert, cooperative, no distress   Head:    Normocephalic, without obvious abnormality, atraumatic   Eyes:       Throat:   Lips, tongue, gums normal   Neck:   Supple, symmetrical, trachea midline, no JVD   Lungs:     Clear to auscultation bilaterally, respirations unlabored   Chest Wall:    No tenderness or deformity    Heart:    Regular rate and rhythm, S1 and S2 normal, no murmur,no  Rub or gallop   Abdomen:     Soft, non-tender, bowel sounds active, no masses, no organomegaly    Extremities:   Extremities normal, atraumatic, no cyanosis or edema   Pulses:      Skin:   Skin is warm and dry,  no rashes or palpable lesions   Neurologic:   Right leg weakness      Lab Results (last 72 hours)     " Procedure Component Value Units Date/Time    Basic Metabolic Panel [[id number]]  (Abnormal) Collected:  12/12/19 0451    Specimen:  Blood Updated:  12/12/19 0557     Glucose 102 mg/dL      BUN 10 mg/dL      Creatinine 0.64 mg/dL      Sodium 138 mmol/L      Potassium 4.3 mmol/L      Chloride 103 mmol/L      CO2 26.8 mmol/L      Calcium 8.9 mg/dL      eGFR Non African Amer 99 mL/min/1.73      BUN/Creatinine Ratio 15.6     Anion Gap 8.2 mmol/L     Narrative:       GFR Normal >60  Chronic Kidney Disease <60  Kidney Failure <15      CBC & Differential [[id number]] Collected:  12/12/19 0451    Specimen:  Blood Updated:  12/12/19 0531    Narrative:       The following orders were created for panel order CBC & Differential.  Procedure                               Abnormality         Status                     ---------                               -----------         ------                     CBC Auto Differential[[id number]]        Abnormal            Final result                 Please view results for these tests on the individual orders.    CBC Auto Differential [[id number]]  (Abnormal) Collected:  12/12/19 0451    Specimen:  Blood Updated:  12/12/19 0531     WBC 6.73 10*3/mm3      RBC 4.05 10*6/mm3      Hemoglobin 12.9 g/dL      Hematocrit 38.4 %      MCV 94.8 fL      MCH 31.9 pg      MCHC 33.6 g/dL      RDW 12.1 %      RDW-SD 42.2 fl      MPV 10.0 fL      Platelets 383 10*3/mm3      Neutrophil % 58.2 %      Lymphocyte % 23.8 %      Monocyte % 11.7 %      Eosinophil % 5.3 %      Basophil % 0.6 %      Immature Grans % 0.4 %      Neutrophils, Absolute 3.91 10*3/mm3      Lymphocytes, Absolute 1.60 10*3/mm3      Monocytes, Absolute 0.79 10*3/mm3      Eosinophils, Absolute 0.36 10*3/mm3      Basophils, Absolute 0.04 10*3/mm3      Immature Grans, Absolute 0.03 10*3/mm3      nRBC 0.0 /100 WBC     Body Fluid Cell Count With Differential - Cerebrospinal Fluid, Spine, Lumbar [[id number]] Collected:  12/11/19 0887    Specimen:   Cerebrospinal Fluid from Spine, Lumbar Updated:  12/11/19 1823    Narrative:       The following orders were created for panel order Body Fluid Cell Count With Differential - Cerebrospinal Fluid, Spine, Lumbar.  Procedure                               Abnormality         Status                     ---------                               -----------         ------                     Body fluid cell count - ...[[id number]]                      Final result                 Please view results for these tests on the individual orders.    Body fluid cell count - Cerebrospinal Fluid, Spine, Lumbar [[id number]] Collected:  12/11/19 1614    Specimen:  Cerebrospinal Fluid from Spine, Lumbar Updated:  12/11/19 1823     Color, Fluid Colorless     Comment: Tube #1        Appearance, Fluid Clear     WBC, Fluid 0 /mm3      RBC, Fluid 4 /mm3     Narrative:       Differential not indicated.    Basic Metabolic Panel [[id number]]  (Normal) Collected:  12/11/19 0618    Specimen:  Blood Updated:  12/11/19 0704     Glucose 94 mg/dL      BUN 11 mg/dL      Creatinine 0.65 mg/dL      Sodium 140 mmol/L      Potassium 4.0 mmol/L      Chloride 104 mmol/L      CO2 25.6 mmol/L      Calcium 8.9 mg/dL      eGFR Non African Amer 97 mL/min/1.73      BUN/Creatinine Ratio 16.9     Anion Gap 10.4 mmol/L     Narrative:       GFR Normal >60  Chronic Kidney Disease <60  Kidney Failure <15      CBC (No Diff) [[id number]]  (Normal) Collected:  12/11/19 0618    Specimen:  Blood Updated:  12/11/19 0633     WBC 7.03 10*3/mm3      RBC 4.22 10*6/mm3      Hemoglobin 13.1 g/dL      Hematocrit 39.3 %      MCV 93.1 fL      MCH 31.0 pg      MCHC 33.3 g/dL      RDW 12.5 %      RDW-SD 42.4 fl      MPV 9.6 fL      Platelets 420 10*3/mm3     Urinalysis With Microscopic If Indicated (No Culture) - Urine, Clean Catch [[id number]]  (Normal) Collected:  12/11/19 0408    Specimen:  Urine, Clean Catch Updated:  12/11/19 0429     Color, UA Yellow     Appearance, UA Clear     pH,  UA 6.0     Specific Gravity, UA 1.013     Glucose, UA Negative     Ketones, UA Negative     Bilirubin, UA Negative     Blood, UA Negative     Protein, UA Negative     Leuk Esterase, UA Negative     Nitrite, UA Negative     Urobilinogen, UA 0.2 E.U./dL    Narrative:       Urine microscopic not indicated.    CBC & Differential [[id number]] Collected:  12/10/19 2347    Specimen:  Blood Updated:  12/11/19 0241    Narrative:       The following orders were created for panel order CBC & Differential.  Procedure                               Abnormality         Status                     ---------                               -----------         ------                     CBC Auto Differential[[id number]]        Abnormal            Final result                 Please view results for these tests on the individual orders.    CBC Auto Differential [[id number]]  (Abnormal) Collected:  12/10/19 2347    Specimen:  Blood Updated:  12/11/19 0241     WBC 7.22 10*3/mm3      RBC 4.54 10*6/mm3      Hemoglobin 14.2 g/dL      Hematocrit 42.5 %      MCV 93.6 fL      MCH 31.3 pg      MCHC 33.4 g/dL      RDW 12.3 %      RDW-SD 42.3 fl      MPV 10.7 fL      Platelets 490 10*3/mm3      Neutrophil % 53.0 %      Lymphocyte % 29.8 %      Monocyte % 10.9 %      Eosinophil % 5.3 %      Basophil % 0.7 %      Immature Grans % 0.3 %      Neutrophils, Absolute 3.83 10*3/mm3      Lymphocytes, Absolute 2.15 10*3/mm3      Monocytes, Absolute 0.79 10*3/mm3      Eosinophils, Absolute 0.38 10*3/mm3      Basophils, Absolute 0.05 10*3/mm3      Immature Grans, Absolute 0.02 10*3/mm3      nRBC 0.0 /100 WBC     Basic Metabolic Panel [[id number]]  (Normal) Collected:  12/10/19 2347    Specimen:  Blood Updated:  12/11/19 0229     Glucose 77 mg/dL      BUN 14 mg/dL      Creatinine 0.60 mg/dL      Sodium 140 mmol/L      Potassium 4.2 mmol/L      Chloride 103 mmol/L      CO2 27.4 mmol/L      Calcium 9.2 mg/dL      eGFR Non African Amer 106 mL/min/1.73       BUN/Creatinine Ratio 23.3     Anion Gap 9.6 mmol/L     Narrative:       GFR Normal >60  Chronic Kidney Disease <60  Kidney Failure <15      hCG, Serum, Qualitative [[id number]]  (Normal) Collected:  12/10/19 2347    Specimen:  Blood Updated:  12/11/19 0224     HCG Qualitative Negative    [place] Draw [[id number]] Collected:  12/10/19 2347    Specimen:  Blood Updated:  12/11/19 0100    Narrative:       The following orders were created for panel order [place] Draw.  Procedure                               Abnormality         Status                     ---------                               -----------         ------                     Light Blue Top[[id number]]                                   Final result               Green Top (Gel)[[id number]]                                  Final result               Lavender Top[[id number]]                                     Final result               Gold Top - SST[[id number]]                                   Final result                 Please view results for these tests on the individual orders.    Green Top (Gel) [[id number]] Collected:  12/10/19 2347    Specimen:  Blood Updated:  12/11/19 0100     Extra Tube Hold for add-ons.     Comment: Auto resulted.       Gold Top - SST [[id number]] Collected:  12/10/19 2347    Specimen:  Blood Updated:  12/11/19 0100     Extra Tube Hold for add-ons.     Comment: Auto resulted.       Light Blue Top [[id number]] Collected:  12/10/19 2347    Specimen:  Blood Updated:  12/11/19 0100     Extra Tube hold for add-on     Comment: Auto resulted       Lavender Top [[id number]] Collected:  12/10/19 2347    Specimen:  Blood Updated:  12/11/19 0100     Extra Tube hold for add-on     Comment: Auto resulted           Data Review:  Results from last 7 days   Lab Units 12/12/19  0451 12/11/19  0618 12/10/19  2347   SODIUM mmol/L 138 140 140   POTASSIUM mmol/L 4.3 4.0 4.2   CHLORIDE mmol/L 103 104 103   CO2 mmol/L 26.8 25.6 27.4   BUN mg/dL 10 11 14    CREATININE mg/dL 0.64 0.65 0.60   GLUCOSE mg/dL 102* 94 77   CALCIUM mg/dL 8.9 8.9 9.2     Results from last 7 days   Lab Units 12/12/19  0451 12/11/19  0618 12/10/19  2347   WBC 10*3/mm3 6.73 7.03 7.22   HEMOGLOBIN g/dL 12.9 13.1 14.2   HEMATOCRIT % 38.4 39.3 42.5   PLATELETS 10*3/mm3 383 420 490*             Lab Results   Lab Value Date/Time    TROPONINT <0.010 05/05/2019 1334    TROPONINT <0.010 05/05/2019 1114         Results from last 7 days   Lab Units 12/08/19  2349   ALK PHOS U/L 51   BILIRUBIN mg/dL <0.2*   ALT (SGPT) U/L 34*   AST (SGOT) U/L 26             No results found for: POCGLU        Past Medical History:   Diagnosis Date   • Allergic    • Breast pain, left    • Elevated cholesterol    • Hemorrhoids 05/02/2012   • Hypothyroidism    • Murmur    • Vaginal atrophy        Assessment:  Active Hospital Problems    Diagnosis  POA   • **Intractable pain [R52]  Yes   • Acute midline low back pain with right-sided sciatica [M54.41]  Unknown   • Hypothyroidism [E03.9]  Yes      Resolved Hospital Problems   No resolved problems to display.       Plan:  Will ask for PT consult and consult neurosurgery wants opinion from [facility]катерина.  Give some solumedrol IV.    Ricky Obregon MD  12/12/2019  10:43 AM

## 2019-12-12 NOTE — PAYOR COMM NOTE
"Katia Puente (49 y.o. Female)     ATTN: NURSE REVIEW    REF#[id number]  PLEASE CALL BACK TO [facility] [phone] OR [phone]  THANKS!   DIMOEDES      Date of Birth Social Security Number Address Home Phone MRN    1970  [id number] [facility] [address] [phone] [id number]    Orthodox Marital Status          Muslim        Admission Date Admission Type Admitting Provider Attending Provider Department, Room/Bed    12/10/19 Emergency Jagjit Sánchez MD Beard, Lyle E, MD [address], P689/1    Discharge Date Discharge Disposition Discharge Destination                       Attending Provider:  Ricky Obregon MD    Allergies:  Codeine, Erythromycin    Isolation:  None   Infection:  None   Code Status:  CPR    Ht:  154.9 cm (60.98\")   Wt:  54.1 kg (119 lb 3.2 oz)    Admission Cmt:  None   Principal Problem:  Intractable pain [R52]                 Active Insurance as of 12/10/2019     Primary Coverage     Payor Plan Insurance Group Employer/Plan Group    [employer] BLUE CROSS [employer] BLUE CROSS BLUE SHIELD PPO [id number]     Payor Plan Address Payor Plan Phone Number Payor Plan Fax Number Effective Dates    [address] [phone]  2019 - None Entered    [place] [address]       Subscriber Name Subscriber Birth Date Member ID       GUSTAVO PUENTE 3/12/1966 [id number]                 Emergency Contacts      (Rel.) Home Phone Work Phone Mobile Phone    Ulisses Puente (Spouse) [phone] -- [phone]               History & Physical      Ricky Obregon MD at 19 1032          HISTORY AND PHYSICAL   [facility]        Patient Identification:  Name: Katia Puente  Age: 49 y.o.  Sex: female  :  1970  MRN: [id number]                     Primary Care Physician: Rene Rivera MD    Chief Complaint:  Back and right leg pain    History of Present Illness:          The patient is a 49-year-old " white female with history of degenerative disc disease of the spine, allergies, hypothyroidism and history of having recent lumbar spine surgery several weeks ago who presents the ER with worsening back pain and pain with sciatica pain radiating down the right leg with some right groin pain and numbness and extreme difficulty walking.  The patient was evaluated in the ER and given some pain medication admitted for further evaluation treatment of her symptoms.    Past Medical History:  Past Medical History:   Diagnosis Date   • Allergic    • Breast pain, left    • Elevated cholesterol    • Hemorrhoids 05/02/2012   • Hypothyroidism    • Murmur    • Vaginal atrophy      Past Surgical History:  Past Surgical History:   Procedure Laterality Date   • BACK SURGERY      L4, L5 discectomy   • BREAST SURGERY     • CHOLECYSTECTOMY     • ENDOSCOPY  01/2013   • GALLBLADDER SURGERY  2016   • HEMORRHOIDECTOMY     • HYSTERECTOMY  2008   • LASIK  1998   • TONSILLECTOMY        Home Meds:  Medications Prior to Admission   Medication Sig Dispense Refill Last Dose   • ALPRAZolam (XANAX) 0.5 MG tablet    Past Week at Unknown time   • ARMOUR THYROID 15 MG tablet    12/10/2019 at Unknown time   • ARMOUR THYROID 60 MG PO tablet 60 mg.   12/10/2019 at Unknown time   • cephalexin (KEFLEX) 500 MG capsule Take 1 capsule by mouth 2 (Two) Times a Day. 14 capsule 0 12/10/2019 at Unknown time   • cetirizine (zyrTEC) 5 MG tablet Take 5 mg by mouth.   12/10/2019 at Unknown time   • cyclobenzaprine (FLEXERIL) 10 MG tablet TAKE 1 TABLET BY MOUTH 3 (THREE) TIMES DAILY AS NEEDED FOR MUSCLE SPASMS FOR UP TO 14 DAYS.  0 Past Week at Unknown time   • Fish Oil-Cholecalciferol (FISH OIL + D3 PO) Take  by mouth.   12/10/2019 at Unknown time   • GRALISE 300 MG tablet TAKE 1 TABLET BY MOUTH AT BEDTIME FOR 7 DAYS, THEN TAKE 2 TABLETS...  (REFER TO PRESCRIPTION NOTES).  0 12/10/2019 at Unknown time   • HYDROcodone-acetaminophen (NORCO) 7.5-325 MG per tablet Take 1  tablet by mouth Every 6 (Six) Hours As Needed for Severe Pain . 8 tablet 0 12/10/2019 at Unknown time   • IRON PO Take  by mouth.   Past Week at Unknown time   • metFORMIN ER (GLUCOPHAGE-XR) 500 MG 24 hr tablet Take 500 mg by mouth Every Morning.  0 12/10/2019 at Unknown time   • Ped Multivitamins-Fl-Iron (MULTIVITAMIN WITH FLUORIDE/IRON) 0.25-10 MG/ML solution solution Take  by mouth daily.   Past Week at Unknown time   • progesterone (PROMETRIUM) 200 MG capsule Take 200 mg by mouth Daily.   Patient Taking Differently at Unknown time   • rosuvastatin (CRESTOR) 5 MG tablet    Past Week at Unknown time   • traMADol (ULTRAM) 50 MG tablet Take 1 tablet by mouth Every 6 (Six) Hours As Needed for Moderate Pain . 30 tablet 0 12/10/2019 at Unknown time   • VYVANSE 50 MG capsule Take 50 mg by mouth Daily  0 12/10/2019 at Unknown time   • amoxicillin-clavulanate (AUGMENTIN) 875-125 MG per tablet Take 1 tablet by mouth Every 12 (Twelve) Hours. 14 tablet 0 More than a month at Unknown time   • celecoxib (CELEBREX) 200 MG capsule Take 1 capsule by mouth Daily. (Patient not taking: Reported on 12/11/2019) 30 capsule 5 Not Taking at Unknown time   • clotrimazole-betamethasone (LOTRISONE) 1-0.05 % cream Apply to affected area twice daily 7 days, then 3 times a week 45 g 6 Unknown at Unknown time   • dexamethasone (DECADRON) 4 MG tablet Take 2 tablets by mouth Take As Directed. Take both tablets by mouth 2 hours before myelogram 2 tablet 0 Unknown at Unknown time   • nystatin (MYCOSTATIN) 726407 UNIT/GM ointment 1 APPLICATION TO AFFECTED SKIN TOPICALLY 3 TIMES PER DAY FOR 7 DAY(S)  0 Unknown at Unknown time     Current meds    Current Facility-Administered Medications:   •  acetaminophen (TYLENOL) tablet 650 mg, 650 mg, Oral, Q4H PRN **OR** acetaminophen (TYLENOL) 160 MG/5ML solution 650 mg, 650 mg, Oral, Q4H PRN **OR** acetaminophen (TYLENOL) suppository 650 mg, 650 mg, Rectal, Q4H PRN, Asuncion Headley APRN  •  bisacodyl  (DULCOLAX) EC tablet 5 mg, 5 mg, Oral, Daily PRN, Asuncion Headley APRN  •  calcium carbonate (TUMS) chewable tablet 500 mg (200 mg elemental), 2 tablet, Oral, BID PRN, Asuncion Headley APRN  •  HYDROcodone-acetaminophen (NORCO) 7.5-325 MG per tablet 1 tablet, 1 tablet, Oral, Q4H PRN, Jagjit Sánchez MD, 1 tablet at 12/11/19 0803  •  HYDROmorphone (DILAUDID) injection 0.5 mg, 0.5 mg, Intravenous, Q2H PRN, Jagjit Sánchez MD, 0.5 mg at 12/11/19 1025  •  ondansetron (ZOFRAN) tablet 4 mg, 4 mg, Oral, Q6H PRN **OR** ondansetron (ZOFRAN) injection 4 mg, 4 mg, Intravenous, Q6H PRN, Asuncion Headley APRN  •  [COMPLETED] Insert peripheral IV, , , Once **AND** sodium chloride 0.9 % flush 10 mL, 10 mL, Intravenous, PRN, Florentin Marcial MD, 10 mL at 12/10/19 2336  •  Insert peripheral IV, , , Once **AND** sodium chloride 0.9 % flush 10 mL, 10 mL, Intravenous, PRN, Edinson Reno III, PA  •  sodium chloride 0.9 % flush 10 mL, 10 mL, Intravenous, Q12H, Asuncion Headley APRN, 10 mL at 12/11/19 0631  •  sodium chloride 0.9 % flush 10 mL, 10 mL, Intravenous, PRN, Asuncion Headley APRN  •  sodium chloride 0.9 % infusion, 75 mL/hr, Intravenous, Continuous, Asuncion Headley APRN, Last Rate: 75 mL/hr at 12/11/19 1025, 75 mL/hr at 12/11/19 1025  Allergies:  Allergies   Allergen Reactions   • Codeine Nausea And Vomiting   • Erythromycin      Immunizations:    There is no immunization history on file for this patient.  Social History:   Social History     Social History Narrative   • Not on file     Social History     Socioeconomic History   • Marital status:      Spouse name: Not on file   • Number of children: Not on file   • Years of education: Not on file   • Highest education level: Not on file   Tobacco Use   • Smoking status: Never Smoker   • Smokeless tobacco: Never Used   Substance and Sexual Activity   • Alcohol use: Yes     Comment: current some day; occ   •  "Drug use: No   • Sexual activity: Yes     Partners: Male     Birth control/protection: Surgical       Family History:  Family History   Problem Relation Age of Onset   • Heart disease Mother    • Heart disease Father    • Diabetes Paternal Aunt    • Stroke Maternal Grandmother    • Cancer Paternal Grandfather         Review of Systems  See history of present illness and past medical history.  Patient denies headache, dizziness, syncope, falls, trauma, change in vision, change in hearing, change in taste, changes in weight, changes in appetite, or paresthesia.  Patient denies chest pain, palpitations, dyspnea, orthopnea, PND, cough, sinus pressure, rhinorrhea, epistaxis, hemoptysis, nausea, vomiting, hematemesis, diarrhea, constipation or hematchezia.  Denies cold or heat intolerance, polydipsia, polyuria, polyphagia. Denies hematuria, pyuria, dysuria, hesitancy, frequency or urgency.  Denies fever, chills, sweats, night sweats.  Denies missing any routine medications. Remainder of ROS is negative.    Objective:  tMax 24 hrs: Temp (24hrs), Av.3 °F (36.3 °C), Min:96.9 °F (36.1 °C), Max:97.8 °F (36.6 °C)    Vitals Ranges:   Temp:  [96.9 °F (36.1 °C)-97.8 °F (36.6 °C)] 97.2 °F (36.2 °C)  Heart Rate:  [72-93] 87  Resp:  [16-18] 16  BP: (124-149)/(80-99) 131/88      Exam:  /88 (BP Location: Left arm, Patient Position: Lying)   Pulse 87   Temp 97.2 °F (36.2 °C) (Oral)   Resp 16   Ht 154.9 cm (60.98\")   Wt 54.1 kg (119 lb 3.2 oz)   SpO2 100%   Breastfeeding No   BMI 22.53 kg/m²      General Appearance:    Alert, cooperative, no distress, appears stated age   Head:    Normocephalic, without obvious abnormality, atraumatic   Eyes:    PERRL, conjunctiva/corneas clear, EOM's intact, both eyes   Ears:    Normal external ear canals, both ears   Nose:   Nares normal, septum midline, mucosa normal, no drainage    or sinus tenderness   Throat:   Lips, mucosa, and tongue normal   Neck:   Supple, symmetrical, trachea " midline, no adenopathy;     thyroid:  no enlargement/tenderness/nodules; no carotid    bruit or JVD   Back:     Symmetric, no curvature, ROM normal, no CVA tenderness   Lungs:     Clear to auscultation bilaterally, respirations unlabored   Chest Wall:    No tenderness or deformity    Heart:    Regular rate and rhythm, S1 and S2 normal, no murmur, rub   or gallop   Abdomen:     Soft, non-tender, bowel sounds active all four quadrants,     no masses, no hepatomegaly, no splenomegaly   Extremities:   Extremities normal, atraumatic, no cyanosis or edema   Pulses:   2+ and symmetric all extremities   Skin:   Skin color, texture, turgor normal, no rashes or lesions   Lymph nodes:   Cervical, supraclavicular, and axillary nodes normal   Neurologic:   CNII-XII intact, right leg weakness, sensation intact throughout      .    Data Review:  Lab Results (last 72 hours)     Procedure Component Value Units Date/Time    Basic Metabolic Panel [[id number]]  (Normal) Collected:  12/11/19 0618    Specimen:  Blood Updated:  12/11/19 0704     Glucose 94 mg/dL      BUN 11 mg/dL      Creatinine 0.65 mg/dL      Sodium 140 mmol/L      Potassium 4.0 mmol/L      Chloride 104 mmol/L      CO2 25.6 mmol/L      Calcium 8.9 mg/dL      eGFR Non African Amer 97 mL/min/1.73      BUN/Creatinine Ratio 16.9     Anion Gap 10.4 mmol/L     Narrative:       GFR Normal >60  Chronic Kidney Disease <60  Kidney Failure <15      CBC (No Diff) [[id number]]  (Normal) Collected:  12/11/19 0618    Specimen:  Blood Updated:  12/11/19 0633     WBC 7.03 10*3/mm3      RBC 4.22 10*6/mm3      Hemoglobin 13.1 g/dL      Hematocrit 39.3 %      MCV 93.1 fL      MCH 31.0 pg      MCHC 33.3 g/dL      RDW 12.5 %      RDW-SD 42.4 fl      MPV 9.6 fL      Platelets 420 10*3/mm3     Urinalysis With Microscopic If Indicated (No Culture) - Urine, Clean Catch [[id number]]  (Normal) Collected:  12/11/19 0408    Specimen:  Urine, Clean Catch Updated:  12/11/19 0429     Color, UA Yellow      Appearance, UA Clear     pH, UA 6.0     Specific Gravity, UA 1.013     Glucose, UA Negative     Ketones, UA Negative     Bilirubin, UA Negative     Blood, UA Negative     Protein, UA Negative     Leuk Esterase, UA Negative     Nitrite, UA Negative     Urobilinogen, UA 0.2 E.U./dL    Narrative:       Urine microscopic not indicated.    CBC & Differential [[id number]] Collected:  12/10/19 2347    Specimen:  Blood Updated:  12/11/19 0241    Narrative:       The following orders were created for panel order CBC & Differential.  Procedure                               Abnormality         Status                     ---------                               -----------         ------                     CBC Auto Differential[[id number]]        Abnormal            Final result                 Please view results for these tests on the individual orders.    CBC Auto Differential [[id number]]  (Abnormal) Collected:  12/10/19 2347    Specimen:  Blood Updated:  12/11/19 0241     WBC 7.22 10*3/mm3      RBC 4.54 10*6/mm3      Hemoglobin 14.2 g/dL      Hematocrit 42.5 %      MCV 93.6 fL      MCH 31.3 pg      MCHC 33.4 g/dL      RDW 12.3 %      RDW-SD 42.3 fl      MPV 10.7 fL      Platelets 490 10*3/mm3      Neutrophil % 53.0 %      Lymphocyte % 29.8 %      Monocyte % 10.9 %      Eosinophil % 5.3 %      Basophil % 0.7 %      Immature Grans % 0.3 %      Neutrophils, Absolute 3.83 10*3/mm3      Lymphocytes, Absolute 2.15 10*3/mm3      Monocytes, Absolute 0.79 10*3/mm3      Eosinophils, Absolute 0.38 10*3/mm3      Basophils, Absolute 0.05 10*3/mm3      Immature Grans, Absolute 0.02 10*3/mm3      nRBC 0.0 /100 WBC     Basic Metabolic Panel [[id number]]  (Normal) Collected:  12/10/19 2347    Specimen:  Blood Updated:  12/11/19 0229     Glucose 77 mg/dL      BUN 14 mg/dL      Creatinine 0.60 mg/dL      Sodium 140 mmol/L      Potassium 4.2 mmol/L      Chloride 103 mmol/L      CO2 27.4 mmol/L      Calcium 9.2 mg/dL      eGFR Non African Amer 106  mL/min/1.73      BUN/Creatinine Ratio 23.3     Anion Gap 9.6 mmol/L     Narrative:       GFR Normal >60  Chronic Kidney Disease <60  Kidney Failure <15      hCG, Serum, Qualitative [[id number]]  (Normal) Collected:  12/10/19 2347    Specimen:  Blood Updated:  12/11/19 0224     HCG Qualitative Negative    Cactus Draw [[id number]] Collected:  12/10/19 2347    Specimen:  Blood Updated:  12/11/19 0100    Narrative:       The following orders were created for panel order Cactus Draw.  Procedure                               Abnormality         Status                     ---------                               -----------         ------                     Light Blue Top[[id number]]                                   Final result               Green Top (Gel)[[id number]]                                  Final result               Lavender Top[[id number]]                                     Final result               Gold Top - SST[[id number]]                                   Final result                 Please view results for these tests on the individual orders.    Green Top (Gel) [[id number]] Collected:  12/10/19 2347    Specimen:  Blood Updated:  12/11/19 0100     Extra Tube Hold for add-ons.     Comment: Auto resulted.       Gold Top - SST [[id number]] Collected:  12/10/19 2347    Specimen:  Blood Updated:  12/11/19 0100     Extra Tube Hold for add-ons.     Comment: Auto resulted.       Light Blue Top [[id number]] Collected:  12/10/19 2347    Specimen:  Blood Updated:  12/11/19 0100     Extra Tube hold for add-on     Comment: Auto resulted       Lavender Top [[id number]] Collected:  12/10/19 2347    Specimen:  Blood Updated:  12/11/19 0100     Extra Tube hold for add-on     Comment: Auto resulted                      Imaging Results (All)     None        Past Medical History:   Diagnosis Date   • Allergic    • Breast pain, left    • Elevated cholesterol    • Hemorrhoids 05/02/2012   • Hypothyroidism    • Murmur    • Vaginal  "atrophy        Assessment:  Active Hospital Problems    Diagnosis  POA   • **Intractable pain [R52]  Yes   • Acute midline low back pain with right-sided sciatica [M54.41]  Unknown   • Hypothyroidism [E03.9]  Yes      Resolved Hospital Problems   No resolved problems to display.       Plan:  The patient admitted to the hospital and will get MRI of lumbar spine with and without contrast and myelogram with CT myelogram imaging per request from her spine surgeon.  I have ordered medication for pain and will get follow-up lab data.    Ricky Obregon MD  12/11/2019  10:32 AM      Electronically signed by Ricky Obregon MD at 12/11/19 1713          Emergency Department Notes      An Ferrari RN at 12/10/19 2124        Pt reports chronic back pain that has become worse this week. Pt reports she is supposed to have a fusion, but needs a CT scan first. Pt reports she is having difficulty walking and emptying her bladder and bowel. Pt reports pain 5/10 when sitting and 10/10 walking or sleeping.     Electronically signed by An Ferrari RN at 12/10/19 2128     Edinson Reno III, PA at 12/10/19 2224     Attestation signed by Florentin Marcial MD at 12/11/19 0607    I agree with the midlevel provider's findings and plan.  I reviewed the midlevel providers note.      For this patient encounter, I reviewed the NP or PA documentation, treatment plan, and medical decision making. Florentin Marcial MD 12/11/2019 6:07 AM                   EMERGENCY DEPARTMENT ENCOUNTER    CHIEF COMPLAINT  Chief Complaint: back pain  History given by: pt  History limited by: nothing  Room Number: 20/20  PMD: Rene Rivera MD      HPI:  Pt is a 49 y.o. female who presents to the ED via private vehicle complaining of constant, worsening, chronic back pain, worsening over the past few weeks. She describes the pain as a \"hot knife stabbing in my back.\" The pain has begun to radiate into her groin, and has difficulty ambulating " "secondary to pain. Also c/o difficulty with bowel movements and difficulty urinating over the past week, secondary to back pain. Pt has been taking Norco with relief for short amount of time, then returns. Pt did see her PCP yesterday, and had XR of back, showing collapsed laminectomy (performed 6 weeks ago by Dr. Carrasco), but has been unable to get scheduled for MRI for further evaluation. Family at bedside.    MEDICAL RECORD REVIEW    Pt was seen here on 12/8/19 for lower back pain, seroma to laminectomy site, and UTI. Pt was discharged home with [place] and Keflex for UTI. She was instructed to follow up with PCP.     Pt saw her PCP yesterday, and was given Augmentin for \"post operative seroma of subcutaneous tissue after non-dermatologic procedure\" and Diflucan for pain management. She was given instructions to follow up with surgeon.    Pt had a laminectomy and discectomy on 10/24 by Dr. Carrasco.    PAST MEDICAL HISTORY  Active Ambulatory Problems     Diagnosis Date Noted   • Hemorrhoids 05/02/2012   • Hypothyroidism    • Stress disorder, acute 11/09/2015   • ADD (attention deficit disorder) 09/08/2016   • Postmenopausal HRT (hormone replacement therapy) 09/08/2016   • Radiculopathy of leg 05/23/2018   • Subacute vulvitis 05/17/2019   • History of total vaginal hysterectomy (TVH) 05/17/2019     Resolved Ambulatory Problems     Diagnosis Date Noted   • No Resolved Ambulatory Problems     Past Medical History:   Diagnosis Date   • Allergic    • Breast pain, left    • Elevated cholesterol    • Murmur    • Vaginal atrophy        PAST SURGICAL HISTORY  Past Surgical History:   Procedure Laterality Date   • BACK SURGERY      L4, L5 discectomy   • BREAST SURGERY     • CHOLECYSTECTOMY     • ENDOSCOPY  01/2013   • GALLBLADDER SURGERY  2016   • HEMORRHOIDECTOMY     • HYSTERECTOMY  2008   • LASIK  1998   • TONSILLECTOMY         FAMILY HISTORY  Family History   Problem Relation Age of Onset   • Heart disease Mother    • Heart " disease Father    • Diabetes Paternal Aunt    • Stroke Maternal Grandmother    • Cancer Paternal Grandfather        SOCIAL HISTORY  Social History     Socioeconomic History   • Marital status:      Spouse name: Not on file   • Number of children: Not on file   • Years of education: Not on file   • Highest education level: Not on file   Tobacco Use   • Smoking status: Never Smoker   • Smokeless tobacco: Never Used   Substance and Sexual Activity   • Alcohol use: Yes     Comment: current some day; occ   • Drug use: No   • Sexual activity: Yes     Partners: Male     Birth control/protection: Surgical       ALLERGIES  Codeine and Erythromycin    REVIEW OF SYSTEMS  Review of Systems   Gastrointestinal:        (+) difficulty with bowel movments   Genitourinary: Positive for difficulty urinating.   Musculoskeletal: Positive for back pain (radiating into groin).     All systems reviewed and negative except for those discussed in HPI.    PHYSICAL EXAM  ED Triage Vitals   Temp Heart Rate Resp BP SpO2   12/10/19 2131 12/10/19 2129 12/10/19 2130 12/10/19 2134 12/10/19 2129   97.8 °F (36.6 °C) 93 16 127/80 99 %      Temp src Heart Rate Source Patient Position BP Location FiO2 (%)   12/10/19 2131 -- -- -- --   Tympanic           Physical Exam   Constitutional: She is oriented to person, place, and time. No distress.   HENT:   Head: Normocephalic and atraumatic.   Eyes: Pupils are equal, round, and reactive to light. EOM are normal.   Neck: Normal range of motion. Neck supple.   Cardiovascular: Normal rate, regular rhythm and normal heart sounds.   Pulmonary/Chest: Effort normal and breath sounds normal. No respiratory distress.   Abdominal: Soft. There is no tenderness. There is no rebound and no guarding.   Musculoskeletal: Normal range of motion. She exhibits no edema.   Pain with slight flexion and extension of trunk.   Neurological: She is alert and oriented to person, place, and time. She has normal sensation and  normal strength. She has an abnormal Straight Leg Raise Test (bilat).   Skin: Skin is warm and dry. No rash noted.   2 5 cm well healed incisions along the lower lumbar region, TTP. No signs of secondary infection, without drainage or erythema.   Psychiatric: Mood and affect normal.   Nursing note and vitals reviewed.      LAB RESULTS  Lab Results (last 24 hours)     Procedure Component Value Units Date/Time    CBC & Differential [[id number]] Collected:  12/10/19 2347    Specimen:  Blood Updated:  12/11/19 0241    Narrative:       The following orders were created for panel order CBC & Differential.  Procedure                               Abnormality         Status                     ---------                               -----------         ------                     CBC Auto Differential[[id number]]        Abnormal            Final result                 Please view results for these tests on the individual orders.    Basic Metabolic Panel [[id number]]  (Normal) Collected:  12/10/19 2347    Specimen:  Blood Updated:  12/11/19 0229     Glucose 77 mg/dL      BUN 14 mg/dL      Creatinine 0.60 mg/dL      Sodium 140 mmol/L      Potassium 4.2 mmol/L      Chloride 103 mmol/L      CO2 27.4 mmol/L      Calcium 9.2 mg/dL      eGFR Non African Amer 106 mL/min/1.73      BUN/Creatinine Ratio 23.3     Anion Gap 9.6 mmol/L     Narrative:       GFR Normal >60  Chronic Kidney Disease <60  Kidney Failure <15      hCG, Serum, Qualitative [[id number]]  (Normal) Collected:  12/10/19 2347    Specimen:  Blood Updated:  12/11/19 0224     HCG Qualitative Negative    CBC Auto Differential [[id number]]  (Abnormal) Collected:  12/10/19 2347    Specimen:  Blood Updated:  12/11/19 0241     WBC 7.22 10*3/mm3      RBC 4.54 10*6/mm3      Hemoglobin 14.2 g/dL      Hematocrit 42.5 %      MCV 93.6 fL      MCH 31.3 pg      MCHC 33.4 g/dL      RDW 12.3 %      RDW-SD 42.3 fl      MPV 10.7 fL      Platelets 490 10*3/mm3      Neutrophil % 53.0 %       Lymphocyte % 29.8 %      Monocyte % 10.9 %      Eosinophil % 5.3 %      Basophil % 0.7 %      Immature Grans % 0.3 %      Neutrophils, Absolute 3.83 10*3/mm3      Lymphocytes, Absolute 2.15 10*3/mm3      Monocytes, Absolute 0.79 10*3/mm3      Eosinophils, Absolute 0.38 10*3/mm3      Basophils, Absolute 0.05 10*3/mm3      Immature Grans, Absolute 0.02 10*3/mm3      nRBC 0.0 /100 WBC     Urinalysis With Microscopic If Indicated (No Culture) - Urine, Clean Catch [[id number]]  (Normal) Collected:  12/11/19 0408    Specimen:  Urine, Clean Catch Updated:  12/11/19 0429     Color, UA Yellow     Appearance, UA Clear     pH, UA 6.0     Specific Gravity, UA 1.013     Glucose, UA Negative     Ketones, UA Negative     Bilirubin, UA Negative     Blood, UA Negative     Protein, UA Negative     Leuk Esterase, UA Negative     Nitrite, UA Negative     Urobilinogen, UA 0.2 E.U./dL    Narrative:       Urine microscopic not indicated.          I ordered the above labs and reviewed the results.    PROGRESS AND CONSULTS    ED Course as of Dec 11 0500   Wed Dec 11, 2019   0001 Limited bedside bladder ultrasound evaluated by me.  Measurements taken per protocol.  Total volume is 71 ml.  RADHA Reno PA-C informed.     [EP]      ED Course User Index  [EP] Lenora Kowalski, APRN     2311- HR 86. O2 sat 99% on RA. /98. Discussed w/ pt and family plan to check labs and bladder scanner, as well as treat pain w/ IVF, Zofran, and Dilaudid. Pt understands and agrees with the plan, all questions answered. Ordered labs and bladder scanner for further evaluation. Also ordered IVF, Zofran, and Dilaudid for sx management.    2325- Reviewed pt's history and workup with Dr. Marcial.  After a bedside evaluation, Dr. Marcial agrees with the plan of care.    0001- Bladder scanner reads 71, per APRANALISA Kowalski. She reports pt is requesting more pain medicine. Ordered another Dilaudid for pain relief.    0045- Per RN, pt ambulated to bathroom without  difficulty.    0204- Pt complaining of more pain. Placed call out to [facility] for admission for pain control and further evaluation.    0234- Talked to OLGA Headley for Dr. Sánchez, [facility], who agrees w/ plan of care and agrees to admit pt for further evaluation.    0317- Per RN, pt is requesting more pain medicine. Ordered another Dilaudid.    MEDICAL DECISION MAKING  Results were reviewed/discussed with the patient and they were also made aware of online access. Pt also made aware that some labs, such as cultures, will not be resulted during ER visit and follow up with PMD is necessary.          DIAGNOSIS  Final diagnoses:   Intractable pain   Acute midline low back pain with right-sided sciatica       DISPOSITION  ADMISSION    Discussed treatment plan and reason for admission with pt/family and admitting physician.  Pt/family voiced understanding of the plan for admission for further testing/treatment as needed.         Latest Documented Vital Signs:  As of 5:00 AM  BP- 137/97 HR- 88 Temp- 97.8 °F (36.6 °C) (Tympanic) O2 sat- 96%    --  Documentation assistance provided by lurdes Jacobson for Abhishek Reno PA-C.  Information recorded by the scribe was done at my direction and has been verified and validated by me.     Yasmine Jacobson  12/11/19 0318       Edinson Reno III, PA  12/11/19 0501      Electronically signed by Florentin Marcial MD at 12/11/19 0607     Florentin Marcial MD at 12/10/19 2312               Florentin Marcial MD  12/11/19 0450      Electronically signed by Florentin Marcial MD at 12/11/19 0450     Nora Oliver, RN at 12/11/19 0406          Nursing report ED to floor  Katia CLAUDIO Bostic  49 y.o.  female    HPI (triage note):   Chief Complaint   Patient presents with   • Back Pain       Admitting doctor:   Jagjit Sánchez MD    Admitting diagnosis:   The primary encounter diagnosis was Intractable pain. A diagnosis of Acute midline low back pain with right-sided  sciatica was also pertinent to this visit.    Code status:   Current Code Status     Date Active Code Status Order ID Comments User Context       12/11/2019 0240 CPR [id number]  Asuncion Headley APRN ED       Questions for Current Code Status     Question Answer Comment    Code Status CPR     Medical Interventions (Level of Support Prior to Arrest) Full           Allergies:   Codeine and Erythromycin    Weight:       12/11/19  0401   Weight: 53.5 kg (118 lb)       Most recent vitals:   Vitals:    12/11/19 0000 12/11/19 0030 12/11/19 0217 12/11/19 0401   BP: 149/94 124/91  137/97   BP Location:    Right arm   Patient Position:    Lying   Pulse: 72 87  88   Resp:    16   Temp:       TempSrc:       SpO2: 99% 97% 98% 96%   Weight:    53.5 kg (118 lb)   Height:           Active LDAs/IV Access:   Lines, Drains & Airways    Active LDAs     Name:   Placement date:   Placement time:   Site:   Days:    Peripheral IV 12/10/19 2335 Right Antecubital   12/10/19    2335    Antecubital   less than 1                Labs (abnormal labs have a star):   Labs Reviewed   CBC WITH AUTO DIFFERENTIAL - Abnormal; Notable for the following components:       Result Value    Platelets 490 (*)     All other components within normal limits   BASIC METABOLIC PANEL - Normal    Narrative:     GFR Normal >60  Chronic Kidney Disease <60  Kidney Failure <15     HCG, SERUM, QUALITATIVE - Normal   RAINBOW DRAW    Narrative:     The following orders were created for panel order [place] Draw.  Procedure                               Abnormality         Status                     ---------                               -----------         ------                     Light Blue Top[[id number]]                                   Final result               Green Top (Gel)[[id number]]                                  Final result               Lavender Top[[id number]]                                     Final result               Gold Top - SST[[id number]]                                    Final result                 Please view results for these tests on the individual orders.   URINALYSIS W/ MICROSCOPIC IF INDICATED (NO CULTURE)   BASIC METABOLIC PANEL   CBC (NO DIFF)   LIGHT BLUE TOP   GREEN TOP   LAVENDER TOP   GOLD TOP - SST   CBC AND DIFFERENTIAL    Narrative:     The following orders were created for panel order CBC & Differential.  Procedure                               Abnormality         Status                     ---------                               -----------         ------                     CBC Auto Differential[[id number]]        Abnormal            Final result                 Please view results for these tests on the individual orders.       EKG:   No orders to display       Meds given in ED:   Medications   sodium chloride 0.9 % flush 10 mL (10 mL Intravenous Given 12/10/19 2336)   sodium chloride 0.9 % flush 10 mL (has no administration in time range)   sodium chloride 0.9 % flush 10 mL (has no administration in time range)   sodium chloride 0.9 % flush 10 mL (has no administration in time range)   sodium chloride 0.9 % infusion (has no administration in time range)   acetaminophen (TYLENOL) tablet 650 mg (has no administration in time range)     Or   acetaminophen (TYLENOL) 160 MG/5ML solution 650 mg (has no administration in time range)     Or   acetaminophen (TYLENOL) suppository 650 mg (has no administration in time range)   bisacodyl (DULCOLAX) EC tablet 5 mg (has no administration in time range)   ondansetron (ZOFRAN) tablet 4 mg (has no administration in time range)     Or   ondansetron (ZOFRAN) injection 4 mg (has no administration in time range)   calcium carbonate (TUMS) chewable tablet 500 mg (200 mg elemental) (has no administration in time range)   sodium chloride 0.9 % bolus 1,000 mL (0 mL Intravenous Stopped 12/11/19 0100)   HYDROmorphone PF (DILAUDID) injection 1 mg (1 mg Intravenous Given 12/10/19 2343)   ondansetron (ZOFRAN)  "injection 4 mg (4 mg Intravenous Given 12/10/19 2792)   HYDROmorphone PF (DILAUDID) injection 1 mg (1 mg Intravenous Given 12/11/19 0122)   HYDROmorphone (DILAUDID) injection 0.5 mg (0.5 mg Intravenous Given 12/11/19 5245)       Imaging results:  No radiology results for the last day    Ambulatory status:   - ASSIST    Social issues:   Social History     Socioeconomic History   • Marital status:      Spouse name: Not on file   • Number of children: Not on file   • Years of education: Not on file   • Highest education level: Not on file   Tobacco Use   • Smoking status: Never Smoker   • Smokeless tobacco: Never Used   Substance and Sexual Activity   • Alcohol use: Yes     Comment: current some day; occ   • Drug use: No   • Sexual activity: Yes     Partners: Male     Birth control/protection: Surgical          Nora Oliver RN  12/11/19 0406      Electronically signed by Nora Oliver RN at 12/11/19 0406       Hospital Medications (active)       Dose Frequency Start End    acetaminophen (TYLENOL) 160 MG/5ML solution 650 mg 650 mg Every 4 Hours PRN 12/11/2019     Admin Instructions: Do not exceed 4 grams of acetaminophen in a 24 hr period.<BR><BR>If given for pain, use the following pain scale: <BR>Mild Pain = Pain Score of 1-3, CPOT 1-2<BR>Moderate Pain = Pain Score of 4-6, CPOT 3-4<BR>Severe Pain = Pain Score of 7-10, CPOT 5-8    Route: Oral    Linked Group 1:  \"Or\" Linked Group Details        acetaminophen (TYLENOL) suppository 650 mg 650 mg Every 4 Hours PRN 12/11/2019     Admin Instructions: Do not exceed 4 grams of acetaminophen in a 24 hr period.<BR><BR>If given for pain, use the following pain scale: <BR>Mild Pain = Pain Score of 1-3, CPOT 1-2<BR>Moderate Pain = Pain Score of 4-6, CPOT 3-4<BR>Severe Pain = Pain Score of 7-10, CPOT 5-8    Route: Rectal    Linked Group 1:  \"Or\" Linked Group Details        acetaminophen (TYLENOL) tablet 650 mg 650 mg Every 4 Hours PRN 12/11/2019     Admin " "Instructions: Do not exceed 4 grams of acetaminophen in a 24 hr period.<BR><BR>If given for pain, use the following pain scale: <BR>Mild Pain = Pain Score of 1-3, CPOT 1-2<BR>Moderate Pain = Pain Score of 4-6, CPOT 3-4<BR>Severe Pain = Pain Score of 7-10, CPOT 5-8    Route: Oral    Linked Group 1:  \"Or\" Linked Group Details        ALPRAZolam (XANAX) tablet 0.5 mg 0.5 mg 3 Times Daily PRN 12/11/2019     Admin Instructions:  Caution: Look alike/sound alike drug alert.   Avoid grapefruit juice    Route: Oral    bisacodyl (DULCOLAX) EC tablet 5 mg 5 mg Daily PRN 12/11/2019     Admin Instructions: Swallow whole. Do not crush, split, or chew tablet.    Route: Oral    calcium carbonate (TUMS) chewable tablet 500 mg (200 mg elemental) 2 tablet 2 Times Daily PRN 12/11/2019     Admin Instructions: One tablet contains 200 mg elemental calcium.  Take with food.    Route: Oral    cetirizine (zyrTEC) tablet 5 mg 5 mg Daily 12/11/2019     Route: Oral    cyclobenzaprine (FLEXERIL) tablet 10 mg 10 mg 3 Times Daily PRN 12/11/2019     Route: Oral    HYDROcodone-acetaminophen (NORCO) 7.5-325 MG per tablet 1 tablet 1 tablet Every 4 Hours PRN 12/11/2019 12/21/2019    Admin Instructions: [HAKEEM]<BR><BR>Do not exceed 4 grams of acetaminophen in a 24 hr period.<BR><BR>If given for pain, use the following pain scale: <BR>Mild Pain = Pain Score of 1-3, CPOT 1-2<BR>Moderate Pain = Pain Score of 4-6, CPOT 3-4<BR>Severe Pain = Pain Score of 7-10, CPOT 5-8    Route: Oral    HYDROcodone-acetaminophen (NORCO) 7.5-325 MG per tablet 1 tablet 1 tablet Every 6 Hours PRN 12/11/2019     Admin Instructions: [HAKEEM]<BR><BR>Do not exceed 4 grams of acetaminophen in a 24 hr period.<BR><BR>If given for pain, use the following pain scale: <BR>Mild Pain = Pain Score of 1-3, CPOT 1-2<BR>Moderate Pain = Pain Score of 4-6, CPOT 3-4<BR>Severe Pain = Pain Score of 7-10, CPOT 5-8    Route: Oral    HYDROmorphone (DILAUDID) injection 0.5 mg 0.5 mg Every 2 Hours PRN " "12/11/2019 12/21/2019    Admin Instructions: If given for pain, use the following pain scale:<BR>Mild Pain = Pain Score of 1-3, CPOT 1-2<BR>Moderate Pain = Pain Score of 4-6, CPOT 3-4<BR>Severe Pain = Pain Score of 7-10, CPOT 5-8    Route: Intravenous    metFORMIN ER (GLUCOPHAGE-XR) 24 hr tablet 500 mg 500 mg Every Morning 12/11/2019     Admin Instructions: Do not give at time of or within 48 hours of iodinated intravenous contrast. Confirm renal function is normal before continuing.<BR>Caution: Look alike/sound alike drug alert. Do not crush, split, or chew.    Route: Oral    methylPREDNISolone sodium succinate (SOLU-Medrol) injection 80 mg 80 mg Every 8 Hours 12/12/2019     Admin Instructions: Caution: Look alike/sound alike drug alert    Route: Intravenous    ondansetron (ZOFRAN) injection 4 mg 4 mg Every 6 Hours PRN 12/11/2019     Admin Instructions: If BOTH ondansetron (ZOFRAN) and promethazine (PHENERGAN) are ordered use ondansetron first and THEN promethazine IF ondansetron is ineffective.    Route: Intravenous    Linked Group 2:  \"Or\" Linked Group Details        ondansetron (ZOFRAN) tablet 4 mg 4 mg Every 6 Hours PRN 12/11/2019     Admin Instructions: If BOTH ondansetron (ZOFRAN) and promethazine (PHENERGAN) are ordered use ondansetron first and THEN promethazine IF ondansetron is ineffective.    Route: Oral    Linked Group 2:  \"Or\" Linked Group Details        progesterone (PROMETRIUM) capsule 200 mg 200 mg Daily 12/11/2019     Admin Instructions: Swallow whole; do not crush, split, or chew.    Route: Oral    rosuvastatin (CRESTOR) tablet 5 mg 5 mg Daily 12/11/2019     Admin Instructions: Avoid grapefruit juice.    Route: Oral    sodium chloride 0.9 % flush 10 mL 10 mL As Needed 12/10/2019     Route: Intravenous    Linked Group 3:  \"And\" Linked Group Details        sodium chloride 0.9 % flush 10 mL 10 mL As Needed 12/11/2019     Route: Intravenous    Cosign for Ordering: Accepted by Florentin Marcial MD on " "2019  4:46 AM    Linked Group 4:  \"And\" Linked Group Details        sodium chloride 0.9 % flush 10 mL 10 mL Every 12 Hours Scheduled 2019     Route: Intravenous    sodium chloride 0.9 % flush 10 mL 10 mL As Needed 2019     Route: Intravenous    Thyroid (ARMOUR) tablet 15 mg 15 mg Every Morning Before Breakfast 2019     Route: Oral    Thyroid (ARMOUR) tablet 60 mg 60 mg Every Morning Before Breakfast 2019     Route: Oral    traMADol (ULTRAM) tablet 50 mg 50 mg Every 6 Hours PRN 2019     Admin Instructions: <BR><BR>Caution: Look alike/sound alike drug alert<BR><BR>If given for pain, use the following pain scale:<BR>Mild Pain = Pain Score of 1-3, CPOT 1-2<BR>Moderate Pain = Pain Score of 4-6, CPOT 3-4<BR>Severe Pain = Pain Score of 7-10, CPOT 5-8    Route: Oral             Physician Progress Notes (last 48 hours) (Notes from 12/10/19 1307 through 19 1307)      Ricky Obregon MD at 19 1042          DAILY PROGRESS NOTE  [facility]    Patient Identification:  Name: Katia Puente  Age: 49 y.o.  Sex: female  :  1970  MRN: [id number]         Primary Care Physician: Rene Rivera MD    Subjective:  Interval History:She complains of back pain and right leg pain.    Objective:    Scheduled Meds:  cetirizine 5 mg Oral Daily   metFORMIN  mg Oral QAM   progesterone 200 mg Oral Daily   rosuvastatin 5 mg Oral Daily   sodium chloride 10 mL Intravenous Q12H   thyroid 15 mg Oral QAM AC   Thyroid 60 mg Oral QAM AC     Continuous Infusions:  sodium chloride 75 mL/hr Last Rate: 75 mL/hr (19 0330)       Vital signs in last 24 hours:  Temp:  [97 °F (36.1 °C)-98.4 °F (36.9 °C)] 97.3 °F (36.3 °C)  Heart Rate:  [61-85] 77  Resp:  [16] 16  BP: (110-138)/(72-82) 138/82    Intake/Output:    Intake/Output Summary (Last 24 hours) at 2019 1043  Last data filed at 2019 0915  Gross per 24 hour   Intake 910 ml   Output 2300 ml   Net -1390 ml " "      Exam:  /82 (BP Location: Right arm, Patient Position: Sitting)   Pulse 77   Temp 97.3 °F (36.3 °C)   Resp 16   Ht 154.9 cm (60.98\")   Wt 54.1 kg (119 lb 3.2 oz)   SpO2 98%   Breastfeeding No   BMI 22.53 kg/m²      General Appearance:    Alert, cooperative, no distress   Head:    Normocephalic, without obvious abnormality, atraumatic   Eyes:       Throat:   Lips, tongue, gums normal   Neck:   Supple, symmetrical, trachea midline, no JVD   Lungs:     Clear to auscultation bilaterally, respirations unlabored   Chest Wall:    No tenderness or deformity    Heart:    Regular rate and rhythm, S1 and S2 normal, no murmur,no  Rub or gallop   Abdomen:     Soft, non-tender, bowel sounds active, no masses, no organomegaly    Extremities:   Extremities normal, atraumatic, no cyanosis or edema   Pulses:      Skin:   Skin is warm and dry,  no rashes or palpable lesions   Neurologic:   Right leg weakness      Lab Results (last 72 hours)     Procedure Component Value Units Date/Time    Basic Metabolic Panel [[id number]]  (Abnormal) Collected:  12/12/19 0451    Specimen:  Blood Updated:  12/12/19 0557     Glucose 102 mg/dL      BUN 10 mg/dL      Creatinine 0.64 mg/dL      Sodium 138 mmol/L      Potassium 4.3 mmol/L      Chloride 103 mmol/L      CO2 26.8 mmol/L      Calcium 8.9 mg/dL      eGFR Non African Amer 99 mL/min/1.73      BUN/Creatinine Ratio 15.6     Anion Gap 8.2 mmol/L     Narrative:       GFR Normal >60  Chronic Kidney Disease <60  Kidney Failure <15      CBC & Differential [[id number]] Collected:  12/12/19 0451    Specimen:  Blood Updated:  12/12/19 0531    Narrative:       The following orders were created for panel order CBC & Differential.  Procedure                               Abnormality         Status                     ---------                               -----------         ------                     CBC Auto Differential[[id number]]        Abnormal            Final result             "     Please view results for these tests on the individual orders.    CBC Auto Differential [[id number]]  (Abnormal) Collected:  12/12/19 0451    Specimen:  Blood Updated:  12/12/19 0531     WBC 6.73 10*3/mm3      RBC 4.05 10*6/mm3      Hemoglobin 12.9 g/dL      Hematocrit 38.4 %      MCV 94.8 fL      MCH 31.9 pg      MCHC 33.6 g/dL      RDW 12.1 %      RDW-SD 42.2 fl      MPV 10.0 fL      Platelets 383 10*3/mm3      Neutrophil % 58.2 %      Lymphocyte % 23.8 %      Monocyte % 11.7 %      Eosinophil % 5.3 %      Basophil % 0.6 %      Immature Grans % 0.4 %      Neutrophils, Absolute 3.91 10*3/mm3      Lymphocytes, Absolute 1.60 10*3/mm3      Monocytes, Absolute 0.79 10*3/mm3      Eosinophils, Absolute 0.36 10*3/mm3      Basophils, Absolute 0.04 10*3/mm3      Immature Grans, Absolute 0.03 10*3/mm3      nRBC 0.0 /100 WBC     Body Fluid Cell Count With Differential - Cerebrospinal Fluid, Spine, Lumbar [[id number]] Collected:  12/11/19 1614    Specimen:  Cerebrospinal Fluid from Spine, Lumbar Updated:  12/11/19 1823    Narrative:       The following orders were created for panel order Body Fluid Cell Count With Differential - Cerebrospinal Fluid, Spine, Lumbar.  Procedure                               Abnormality         Status                     ---------                               -----------         ------                     Body fluid cell count - ...[[id number]]                      Final result                 Please view results for these tests on the individual orders.    Body fluid cell count - Cerebrospinal Fluid, Spine, Lumbar [[id number]] Collected:  12/11/19 1614    Specimen:  Cerebrospinal Fluid from Spine, Lumbar Updated:  12/11/19 1823     Color, Fluid Colorless     Comment: Tube #1        Appearance, Fluid Clear     WBC, Fluid 0 /mm3      RBC, Fluid 4 /mm3     Narrative:       Differential not indicated.    Basic Metabolic Panel [[id number]]  (Normal) Collected:  12/11/19 0618    Specimen:  Blood  Updated:  12/11/19 0704     Glucose 94 mg/dL      BUN 11 mg/dL      Creatinine 0.65 mg/dL      Sodium 140 mmol/L      Potassium 4.0 mmol/L      Chloride 104 mmol/L      CO2 25.6 mmol/L      Calcium 8.9 mg/dL      eGFR Non African Amer 97 mL/min/1.73      BUN/Creatinine Ratio 16.9     Anion Gap 10.4 mmol/L     Narrative:       GFR Normal >60  Chronic Kidney Disease <60  Kidney Failure <15      CBC (No Diff) [[id number]]  (Normal) Collected:  12/11/19 0618    Specimen:  Blood Updated:  12/11/19 0633     WBC 7.03 10*3/mm3      RBC 4.22 10*6/mm3      Hemoglobin 13.1 g/dL      Hematocrit 39.3 %      MCV 93.1 fL      MCH 31.0 pg      MCHC 33.3 g/dL      RDW 12.5 %      RDW-SD 42.4 fl      MPV 9.6 fL      Platelets 420 10*3/mm3     Urinalysis With Microscopic If Indicated (No Culture) - Urine, Clean Catch [[id number]]  (Normal) Collected:  12/11/19 0408    Specimen:  Urine, Clean Catch Updated:  12/11/19 0429     Color, UA Yellow     Appearance, UA Clear     pH, UA 6.0     Specific Gravity, UA 1.013     Glucose, UA Negative     Ketones, UA Negative     Bilirubin, UA Negative     Blood, UA Negative     Protein, UA Negative     Leuk Esterase, UA Negative     Nitrite, UA Negative     Urobilinogen, UA 0.2 E.U./dL    Narrative:       Urine microscopic not indicated.    CBC & Differential [[id number]] Collected:  12/10/19 2347    Specimen:  Blood Updated:  12/11/19 0241    Narrative:       The following orders were created for panel order CBC & Differential.  Procedure                               Abnormality         Status                     ---------                               -----------         ------                     CBC Auto Differential[[id number]]        Abnormal            Final result                 Please view results for these tests on the individual orders.    CBC Auto Differential [[id number]]  (Abnormal) Collected:  12/10/19 2347    Specimen:  Blood Updated:  12/11/19 0241     WBC 7.22 10*3/mm3      RBC 4.54  10*6/mm3      Hemoglobin 14.2 g/dL      Hematocrit 42.5 %      MCV 93.6 fL      MCH 31.3 pg      MCHC 33.4 g/dL      RDW 12.3 %      RDW-SD 42.3 fl      MPV 10.7 fL      Platelets 490 10*3/mm3      Neutrophil % 53.0 %      Lymphocyte % 29.8 %      Monocyte % 10.9 %      Eosinophil % 5.3 %      Basophil % 0.7 %      Immature Grans % 0.3 %      Neutrophils, Absolute 3.83 10*3/mm3      Lymphocytes, Absolute 2.15 10*3/mm3      Monocytes, Absolute 0.79 10*3/mm3      Eosinophils, Absolute 0.38 10*3/mm3      Basophils, Absolute 0.05 10*3/mm3      Immature Grans, Absolute 0.02 10*3/mm3      nRBC 0.0 /100 WBC     Basic Metabolic Panel [[id number]]  (Normal) Collected:  12/10/19 2347    Specimen:  Blood Updated:  12/11/19 0229     Glucose 77 mg/dL      BUN 14 mg/dL      Creatinine 0.60 mg/dL      Sodium 140 mmol/L      Potassium 4.2 mmol/L      Chloride 103 mmol/L      CO2 27.4 mmol/L      Calcium 9.2 mg/dL      eGFR Non African Amer 106 mL/min/1.73      BUN/Creatinine Ratio 23.3     Anion Gap 9.6 mmol/L     Narrative:       GFR Normal >60  Chronic Kidney Disease <60  Kidney Failure <15      hCG, Serum, Qualitative [[id number]]  (Normal) Collected:  12/10/19 2347    Specimen:  Blood Updated:  12/11/19 0224     HCG Qualitative Negative    [place] Draw [[id number]] Collected:  12/10/19 2347    Specimen:  Blood Updated:  12/11/19 0100    Narrative:       The following orders were created for panel order [place] Draw.  Procedure                               Abnormality         Status                     ---------                               -----------         ------                     Light Blue Top[[id number]]                                   Final result               Green Top (Gel)[[id number]]                                  Final result               Lavender Top[[id number]]                                     Final result               Gold Top - SST[[id number]]                                   Final result                  Please view results for these tests on the individual orders.    Green Top (Gel) [[id number]] Collected:  12/10/19 2347    Specimen:  Blood Updated:  12/11/19 0100     Extra Tube Hold for add-ons.     Comment: Auto resulted.       Gold Top - SST [[id number]] Collected:  12/10/19 2347    Specimen:  Blood Updated:  12/11/19 0100     Extra Tube Hold for add-ons.     Comment: Auto resulted.       Light Blue Top [[id number]] Collected:  12/10/19 2347    Specimen:  Blood Updated:  12/11/19 0100     Extra Tube hold for add-on     Comment: Auto resulted       Lavender Top [[id number]] Collected:  12/10/19 2347    Specimen:  Blood Updated:  12/11/19 0100     Extra Tube hold for add-on     Comment: Auto resulted           Data Review:  Results from last 7 days   Lab Units 12/12/19  0451 12/11/19  0618 12/10/19  2347   SODIUM mmol/L 138 140 140   POTASSIUM mmol/L 4.3 4.0 4.2   CHLORIDE mmol/L 103 104 103   CO2 mmol/L 26.8 25.6 27.4   BUN mg/dL 10 11 14   CREATININE mg/dL 0.64 0.65 0.60   GLUCOSE mg/dL 102* 94 77   CALCIUM mg/dL 8.9 8.9 9.2     Results from last 7 days   Lab Units 12/12/19  0451 12/11/19  0618 12/10/19  2347   WBC 10*3/mm3 6.73 7.03 7.22   HEMOGLOBIN g/dL 12.9 13.1 14.2   HEMATOCRIT % 38.4 39.3 42.5   PLATELETS 10*3/mm3 383 420 490*             Lab Results   Lab Value Date/Time    TROPONINT <0.010 05/05/2019 1334    TROPONINT <0.010 05/05/2019 1114         Results from last 7 days   Lab Units 12/08/19  2349   ALK PHOS U/L 51   BILIRUBIN mg/dL <0.2*   ALT (SGPT) U/L 34*   AST (SGOT) U/L 26             No results found for: POCGLU        Past Medical History:   Diagnosis Date   • Allergic    • Breast pain, left    • Elevated cholesterol    • Hemorrhoids 05/02/2012   • Hypothyroidism    • Murmur    • Vaginal atrophy        Assessment:  Active Hospital Problems    Diagnosis  POA   • **Intractable pain [R52]  Yes   • Acute midline low back pain with right-sided sciatica [M54.41]  Unknown   • Hypothyroidism [E03.9]   Yes      Resolved Hospital Problems   No resolved problems to display.       Plan:  Will ask for PT consult and consult neurosurgery wants opinion from Екатерина.  Give some solumedrol IV.    Ricky Obregon MD  12/12/2019  10:43 AM      Electronically signed by Ricky Obregon MD at 12/12/19 1047       Consult Notes (last 48 hours) (Notes from 12/10/19 1307 through 12/12/19 1307)    No notes of this type exist for this encounter.

## 2019-12-12 NOTE — SIGNIFICANT NOTE
12/12/19 1501   Rehab Time/Intention   Evaluation Not Performed patient unavailable for evaluation  (pt off floor this pm for Dexa scan. WIll follow up tomorrow.)   Rehab Treatment   Discipline physical therapist   Recommendation   PT - Next Appointment 12/13/19

## 2019-12-13 VITALS
HEIGHT: 61 IN | HEART RATE: 84 BPM | SYSTOLIC BLOOD PRESSURE: 122 MMHG | BODY MASS INDEX: 22.51 KG/M2 | TEMPERATURE: 97 F | WEIGHT: 119.2 LBS | OXYGEN SATURATION: 100 % | DIASTOLIC BLOOD PRESSURE: 73 MMHG | RESPIRATION RATE: 16 BRPM

## 2019-12-13 PROCEDURE — 25010000002 HYDROMORPHONE PER 4 MG: Performed by: INTERNAL MEDICINE

## 2019-12-13 PROCEDURE — 25010000002 METHYLPREDNISOLONE PER 125 MG: Performed by: HOSPITALIST

## 2019-12-13 RX ORDER — HYDROCODONE BITARTRATE AND ACETAMINOPHEN 7.5; 325 MG/1; MG/1
1 TABLET ORAL EVERY 4 HOURS PRN
Qty: 30 TABLET | Refills: 0 | Status: SHIPPED | OUTPATIENT
Start: 2019-12-13 | End: 2019-12-21

## 2019-12-13 RX ADMIN — HYDROMORPHONE HYDROCHLORIDE 0.5 MG: 10 INJECTION INTRAMUSCULAR; INTRAVENOUS; SUBCUTANEOUS at 04:06

## 2019-12-13 RX ADMIN — METHYLPREDNISOLONE SODIUM SUCCINATE 80 MG: 125 INJECTION, POWDER, FOR SOLUTION INTRAMUSCULAR; INTRAVENOUS at 00:30

## 2019-12-13 RX ADMIN — METHYLPREDNISOLONE SODIUM SUCCINATE 80 MG: 125 INJECTION, POWDER, FOR SOLUTION INTRAMUSCULAR; INTRAVENOUS at 09:17

## 2019-12-13 RX ADMIN — HYDROMORPHONE HYDROCHLORIDE 0.5 MG: 10 INJECTION INTRAMUSCULAR; INTRAVENOUS; SUBCUTANEOUS at 00:30

## 2019-12-13 RX ADMIN — LEVOTHYROXINE, LIOTHYRONINE 15 MG: 19; 4.5 TABLET ORAL at 06:39

## 2019-12-13 RX ADMIN — CETIRIZINE HYDROCHLORIDE 5 MG: 10 TABLET, FILM COATED ORAL at 09:17

## 2019-12-13 RX ADMIN — ROSUVASTATIN CALCIUM 5 MG: 5 TABLET, FILM COATED ORAL at 09:17

## 2019-12-13 RX ADMIN — LEVOTHYROXINE, LIOTHYRONINE 60 MG: 38; 9 TABLET ORAL at 06:39

## 2019-12-13 NOTE — DISCHARGE SUMMARY
PHYSICIAN DISCHARGE SUMMARY                                                                        [facility]    Patient Identification:  Name: Katia Puente  Age: 49 y.o.  Sex: female  :  1970  MRN: [id number]  Primary Care Physician: Rene Rivera MD    Admit date: 12/10/2019  Discharge date and time:2019  Discharged Condition: good    Discharge Diagnoses:  Active Hospital Problems    Diagnosis  POA   • **Intractable pain [R52]  Yes   • Acute midline low back pain with right-sided sciatica [M54.41]  Unknown   • Hypothyroidism [E03.9]  Yes      Resolved Hospital Problems   No resolved problems to display.          PMHX:   Past Medical History:   Diagnosis Date   • Allergic    • Breast pain, left    • Elevated cholesterol    • Hemorrhoids 2012   • Hypothyroidism    • Murmur    • Vaginal atrophy      PSHX:   Past Surgical History:   Procedure Laterality Date   • BACK SURGERY      L4, L5 discectomy   • BREAST SURGERY     • CHOLECYSTECTOMY     • ENDOSCOPY  2013   • GALLBLADDER SURGERY     • HEMORRHOIDECTOMY     • HYSTERECTOMY     • LASIK     • TONSILLECTOMY         Hospital Course: Katia Puente  is a 49-year-old white female with history of degenerative disc disease of the spine, allergies, hypothyroidism and history of having recent lumbar spine surgery several weeks ago who presents the ER with worsening back pain and pain with sciatica pain radiating down the right leg with some right groin pain and numbness and extreme difficulty walking. The patient was evaluated in the ER and given some pain medication admitted for further evaluation treatment of her symptoms.        The patient was admitted to hospital and given medication for pain and some steroids.  Patient was seen by neurosurgery.  He was doing little better after being in the hospital for couple days and felt well enough to go  home.  She is going to follow-up with her primary neurosurgeon review the studies and possibly have surgery next week if she has continued symptoms.  She will also follow-up with her primary care provider.    Consults:     Consults     Date and Time Order Name Status Description    12/12/2019 1040 Inpatient Neurosurgery Consult Completed     12/11/2019 0204 [facility] (on-call MD unless specified) Details Completed         Results from last 7 days   Lab Units 12/12/19  0451   WBC 10*3/mm3 6.73   HEMOGLOBIN g/dL 12.9   HEMATOCRIT % 38.4   PLATELETS 10*3/mm3 383     Results from last 7 days   Lab Units 12/12/19  0451   SODIUM mmol/L 138   POTASSIUM mmol/L 4.3   CHLORIDE mmol/L 103   CO2 mmol/L 26.8   BUN mg/dL 10   CREATININE mg/dL 0.64   GLUCOSE mg/dL 102*   CALCIUM mg/dL 8.9     Significant Diagnostic Studies:   WBC   Date Value Ref Range Status   12/12/2019 6.73 3.40 - 10.80 10*3/mm3 Final     Hemoglobin   Date Value Ref Range Status   12/12/2019 12.9 12.0 - 15.9 g/dL Final     Hematocrit   Date Value Ref Range Status   12/12/2019 38.4 34.0 - 46.6 % Final     Platelets   Date Value Ref Range Status   12/12/2019 383 140 - 450 10*3/mm3 Final     Sodium   Date Value Ref Range Status   12/12/2019 138 136 - 145 mmol/L Final     Potassium   Date Value Ref Range Status   12/12/2019 4.3 3.5 - 5.2 mmol/L Final     Chloride   Date Value Ref Range Status   12/12/2019 103 98 - 107 mmol/L Final     CO2   Date Value Ref Range Status   12/12/2019 26.8 22.0 - 29.0 mmol/L Final     BUN   Date Value Ref Range Status   12/12/2019 10 6 - 20 mg/dL Final     Creatinine   Date Value Ref Range Status   12/12/2019 0.64 0.57 - 1.00 mg/dL Final     Glucose   Date Value Ref Range Status   12/12/2019 102 (H) 65 - 99 mg/dL Final     Calcium   Date Value Ref Range Status   12/12/2019 8.9 8.6 - 10.5 mg/dL Final     No results found for: AST, ALT, ALKPHOS  No results found for: APTT, INR  Color, UA   Date Value Ref Range Status   12/11/2019 Yellow  Yellow, Straw Final     Appearance, UA   Date Value Ref Range Status   12/11/2019 Clear Clear Final     pH, UA   Date Value Ref Range Status   12/11/2019 6.0 5.0 - 8.0 Final     Glucose, UA   Date Value Ref Range Status   12/11/2019 Negative Negative Final     Ketones, UA   Date Value Ref Range Status   12/11/2019 Negative Negative Final     Blood, UA   Date Value Ref Range Status   12/11/2019 Negative Negative Final     Leuk Esterase, UA   Date Value Ref Range Status   12/11/2019 Negative Negative Final     Bilirubin, UA   Date Value Ref Range Status   12/11/2019 Negative Negative Final     Urobilinogen, UA   Date Value Ref Range Status   12/11/2019 0.2 E.U./dL 0.2 - 1.0 E.U./dL Final     No results found for: TROPONINT, TROPONINI, BNP  No components found for: HGBA1C;2  No components found for: TSH;2  Imaging Results (All)     Procedure Component Value Units Date/Time    DEXA Bone Density Axial [[id number]] Collected:  12/12/19 1634     Updated:  12/12/19 1647    Narrative:       BONE MINERAL DENSITOMETRY     HISTORY: Perimenopausal. Failed back surgery. Baseline exam.     COMPARISON: None.     TECHNIQUE: The T score compares the patient's bone mineral density with  the peak bone mass of young normal patients. Patients with T-scores  between 1.0 and 2.5 standard deviations below the mean are osteopenic.  Patients with T-scores greater than 2.5 standard deviation below the  mean are osteoporotic.     The Z score compares the patient's bone mineral density with sex and age  matched patients. Z score of -2.0 and lower is an indication of low  mineral density for the patient's age.     FINDINGS:   LUMBAR SPINE:  The BMD measured in the L1 through L4 is 0.843 g/cm2 for  a T-score of -1.9 and a Z-score of -1.2.     LEFT HIP: The BMD for the femoral neck is 0.671g/cm2 for a T score of   -1.6 and a Z score of -0.9.     RIGHT HIP:  The BMD for the femoral neck is 0.648 g/cm2 for a T score of  -1.8 and a Z score of -1.1.        Impression:       If the patient is considered to be postmenopausal, the bone  density T-scores indicate osteopenia and the 10-year fracture risk for a  major osteoarthritic fracture is 4.2% and for a hip fracture 0.4%.     This report was finalized on 12/12/2019 4:44 PM by Dr. Jelani Anaya M.D.       IR Myelogram Lumbar Spine [[id number]] Collected:  12/12/19 0831     Updated:  12/12/19 0904    Narrative:       PROCEDURE: IR MYELOGRAM LUMBAR SPINE-, CT LUMBAR SPINE W INTRATHECAL  CONTRAST-performed on 12/11/2019.     HISTORY: 49-year-old female with low back pain and right-sided groin  pain and right leg pain. Patient status post laminectomy 6 weeks ago.  Lumbar pain, Rt leg weakness after spine surgery in Oct 2019; R52-Pain,  unspecified; M54.41-Lumbago with sciatica, right side;  M54.10-Radiculopathy, site unspecified     TECHNIQUE: Thin section axial CT with sagittal reconstructions following  intrathecal contrast administration at the L2-3 level via a 22-gauge  spinal needle. 13 cc of Isovue M 200 were injected. Radiographs were  then obtained prior to CT scanning.      FLUOROSCOPY TIME: 1.03 minutes, 18 images.     FINDINGS: No fracture is present. Alignment is abnormal with a  thoracolumbar scoliosis present including concavity to the left and  convex to the to the right centered at approximately L1.      T12-L1: There is mild disc space narrowing present. Conus medullaris  terminates at the T12 level without enlargement. A left paracentral disc  protrusion is seen which flattens the anterior thecal sac, but does not  cause significant spinal stenosis. No facet hypertrophy is appreciated.     L1-L2: Mild left paracentral disc protrusion which flattens the anterior  thecal sac, but does not cause any significant spinal stenosis. No facet  hypertrophy identified.     L2-L3: No significant disc disease is present. There is no canal  stenosis. A right-sided small nerve root mass is seen which is  most  likely a schwannoma. A conjoined root may also give this appearance.     L3-L4: No significant disc disease is present. There is no canal  stenosis. A right-sided small nerve root mass is seen which again is  most likely a schwannoma or conjoined root.     L4-L5: There is disc space narrowing with a broad-based disc herniation  that impinges on the thecal sac and causes some mild spinal stenosis.  Mild bilateral facet arthrosis.      L5-S1: There is disc space narrowing with a vacuum phenomenon present  and a central disc protrusion but no significant impingement on the  thecal sac. Mild bilateral facet arthrosis.       Impression:       Thoracolumbar scoliosis with the some degenerative disc  changes most significant at the L4-5 level. Possible nerve root  schwannomas as described above.      Radiation dose reduction techniques were utilized, including automated  exposure control and exposure modulation based on body size.        This report was finalized on 12/12/2019 9:01 AM by Dr. Benson Pradhan MD.       CT Lumbar Spine With Intrathecal Contrast [[id number]] Collected:  12/12/19 0831     Updated:  12/12/19 0904    Narrative:       PROCEDURE: IR MYELOGRAM LUMBAR SPINE-, CT LUMBAR SPINE W INTRATHECAL  CONTRAST-performed on 12/11/2019.     HISTORY: 49-year-old female with low back pain and right-sided groin  pain and right leg pain. Patient status post laminectomy 6 weeks ago.  Lumbar pain, Rt leg weakness after spine surgery in Oct 2019; R52-Pain,  unspecified; M54.41-Lumbago with sciatica, right side;  M54.10-Radiculopathy, site unspecified     TECHNIQUE: Thin section axial CT with sagittal reconstructions following  intrathecal contrast administration at the L2-3 level via a 22-gauge  spinal needle. 13 cc of Isovue M 200 were injected. Radiographs were  then obtained prior to CT scanning.      FLUOROSCOPY TIME: 1.03 minutes, 18 images.     FINDINGS: No fracture is present. Alignment is abnormal with  a  thoracolumbar scoliosis present including concavity to the left and  convex to the to the right centered at approximately L1.      T12-L1: There is mild disc space narrowing present. Conus medullaris  terminates at the T12 level without enlargement. A left paracentral disc  protrusion is seen which flattens the anterior thecal sac, but does not  cause significant spinal stenosis. No facet hypertrophy is appreciated.     L1-L2: Mild left paracentral disc protrusion which flattens the anterior  thecal sac, but does not cause any significant spinal stenosis. No facet  hypertrophy identified.     L2-L3: No significant disc disease is present. There is no canal  stenosis. A right-sided small nerve root mass is seen which is most  likely a schwannoma. A conjoined root may also give this appearance.     L3-L4: No significant disc disease is present. There is no canal  stenosis. A right-sided small nerve root mass is seen which again is  most likely a schwannoma or conjoined root.     L4-L5: There is disc space narrowing with a broad-based disc herniation  that impinges on the thecal sac and causes some mild spinal stenosis.  Mild bilateral facet arthrosis.      L5-S1: There is disc space narrowing with a vacuum phenomenon present  and a central disc protrusion but no significant impingement on the  thecal sac. Mild bilateral facet arthrosis.       Impression:       Thoracolumbar scoliosis with the some degenerative disc  changes most significant at the L4-5 level. Possible nerve root  schwannomas as described above.      Radiation dose reduction techniques were utilized, including automated  exposure control and exposure modulation based on body size.        This report was finalized on 12/12/2019 9:01 AM by Dr. Benson Pradhan MD.       MRI Lumbar Spine With & Without Contrast [[id number]] Collected:  12/11/19 1622     Updated:  12/11/19 1626    Narrative:       MRI EXAMINATION OF THE LUMBAR SPINE WITH AND WITHOUT  CONTRAST     HISTORY:  Back pain, right radiculopathy. Previous lumbar surgery.     COMPARISON: MRI lumbar spine 05/18/2018.     FINDINGS: There is dextroscoliosis of the lumbar spine with the apex at  the level of L3. There is moderate loss of disc height and disc  desiccation at L5-S1 and to a lesser extent L4-L5 similar in appearance  as compared to the MRI examination of 05/18/2018.     L1-L2: There is a left lateral disc osteophyte complex resulting in mild  lateral recess narrowing similar in appearance as compared to the prior  examination.     L2-3: Mild facet degenerative disease is present bilaterally. There is  no evidence of disc bulge or herniation.     L3-L4: Mild facet degenerative disease is present bilaterally. There is  no evidence of disc bulge or herniation.     L4-L5: A right laminotomy and partial medial facetectomy is noted. There  is a central disc osteophyte complex including a central disc protrusion  present previously. There is signal loss anterior lateral to the thecal  sac to the right in the epidural space T1 sequence. This area enhances  after contrast administration with the exception of a small focus of  nonenhancement suspicious for a small superiorly directed disc  protrusion or herniation (also confirmed on the sagittal T1 postcontrast  sequence).  The superiorly directed disc protrusion/herniation  approaches but does not definitively involve the exiting right L4 nerve.  It is new versus the MRI examination of 05/18/2018. The enhancing tissue  is consistent with granulation tissue and abuts the traversing right L5  nerve root. There is mild lateral recess narrowing on the right. Mild  neuroforaminal compromise is noted on the right secondary to loss of  disc height and extension of a small disc osteophyte complex into the  neural foramen.     L5-S1: A right laminotomy is noted. Enhancing tissue is identified  adjacent to the traversing S1 nerve root consistent with  granulation  tissue. There is no evidence of a disc herniation or disc fragment. Mild  neuroforaminal compromise is present on the right secondary to loss of  disc height and extension of a small disc osteophyte complex into the  neural foramen.       Impression:       The patient is status post right laminotomies and partial  medial facetectomies at L4-L5 and L5-S1. There is enhancing tissue  consistent with granulation tissue at each level anterolateral, lateral  and posteriolateral to the thecal sac to the right abutting the  traversing nerve roots. At L4-L5, a small focus of nonenhancement is  identified which was not present on 05/18/2018 and represents a small  superiorly directed disc protrusion or herniation laterally to the  right. This approaches but does not definitively involve the exiting L4  nerve. There is mild lateral recess narrowing on the right at L4-L5 and  a mild degree of canal stenosis secondary to a central disc osteophyte  complex which is slightly more prominent to the left of midline. Should  a more recent MRI examination of the lumbar spine be made available for  comparison, I would be more than happy to compare the studies and to  generate a supplemental report.     This report was finalized on 12/11/2019 4:23 PM by Dr. Zachary Naik M.D.           Lab Results (last 7 days)     Procedure Component Value Units Date/Time    Basic Metabolic Panel [[id number]]  (Abnormal) Collected:  12/12/19 0451    Specimen:  Blood Updated:  12/12/19 0557     Glucose 102 mg/dL      BUN 10 mg/dL      Creatinine 0.64 mg/dL      Sodium 138 mmol/L      Potassium 4.3 mmol/L      Chloride 103 mmol/L      CO2 26.8 mmol/L      Calcium 8.9 mg/dL      eGFR Non African Amer 99 mL/min/1.73      BUN/Creatinine Ratio 15.6     Anion Gap 8.2 mmol/L     Narrative:       GFR Normal >60  Chronic Kidney Disease <60  Kidney Failure <15      CBC & Differential [[id number]] Collected:  12/12/19 0451    Specimen:  Blood Updated:   12/12/19 0531    Narrative:       The following orders were created for panel order CBC & Differential.  Procedure                               Abnormality         Status                     ---------                               -----------         ------                     CBC Auto Differential[[id number]]        Abnormal            Final result                 Please view results for these tests on the individual orders.    CBC Auto Differential [[id number]]  (Abnormal) Collected:  12/12/19 0451    Specimen:  Blood Updated:  12/12/19 0531     WBC 6.73 10*3/mm3      RBC 4.05 10*6/mm3      Hemoglobin 12.9 g/dL      Hematocrit 38.4 %      MCV 94.8 fL      MCH 31.9 pg      MCHC 33.6 g/dL      RDW 12.1 %      RDW-SD 42.2 fl      MPV 10.0 fL      Platelets 383 10*3/mm3      Neutrophil % 58.2 %      Lymphocyte % 23.8 %      Monocyte % 11.7 %      Eosinophil % 5.3 %      Basophil % 0.6 %      Immature Grans % 0.4 %      Neutrophils, Absolute 3.91 10*3/mm3      Lymphocytes, Absolute 1.60 10*3/mm3      Monocytes, Absolute 0.79 10*3/mm3      Eosinophils, Absolute 0.36 10*3/mm3      Basophils, Absolute 0.04 10*3/mm3      Immature Grans, Absolute 0.03 10*3/mm3      nRBC 0.0 /100 WBC     Body Fluid Cell Count With Differential - Cerebrospinal Fluid, Spine, Lumbar [[id number]] Collected:  12/11/19 1614    Specimen:  Cerebrospinal Fluid from Spine, Lumbar Updated:  12/11/19 1823    Narrative:       The following orders were created for panel order Body Fluid Cell Count With Differential - Cerebrospinal Fluid, Spine, Lumbar.  Procedure                               Abnormality         Status                     ---------                               -----------         ------                     Body fluid cell count - ...[[id number]]                      Final result                 Please view results for these tests on the individual orders.    Body fluid cell count - Cerebrospinal Fluid, Spine, Lumbar [[id number]] Collected:   12/11/19 1614    Specimen:  Cerebrospinal Fluid from Spine, Lumbar Updated:  12/11/19 1823     Color, Fluid Colorless     Comment: Tube #1        Appearance, Fluid Clear     WBC, Fluid 0 /mm3      RBC, Fluid 4 /mm3     Narrative:       Differential not indicated.    Basic Metabolic Panel [[id number]]  (Normal) Collected:  12/11/19 0618    Specimen:  Blood Updated:  12/11/19 0704     Glucose 94 mg/dL      BUN 11 mg/dL      Creatinine 0.65 mg/dL      Sodium 140 mmol/L      Potassium 4.0 mmol/L      Chloride 104 mmol/L      CO2 25.6 mmol/L      Calcium 8.9 mg/dL      eGFR Non African Amer 97 mL/min/1.73      BUN/Creatinine Ratio 16.9     Anion Gap 10.4 mmol/L     Narrative:       GFR Normal >60  Chronic Kidney Disease <60  Kidney Failure <15      CBC (No Diff) [[id number]]  (Normal) Collected:  12/11/19 0618    Specimen:  Blood Updated:  12/11/19 0633     WBC 7.03 10*3/mm3      RBC 4.22 10*6/mm3      Hemoglobin 13.1 g/dL      Hematocrit 39.3 %      MCV 93.1 fL      MCH 31.0 pg      MCHC 33.3 g/dL      RDW 12.5 %      RDW-SD 42.4 fl      MPV 9.6 fL      Platelets 420 10*3/mm3     Urinalysis With Microscopic If Indicated (No Culture) - Urine, Clean Catch [[id number]]  (Normal) Collected:  12/11/19 0408    Specimen:  Urine, Clean Catch Updated:  12/11/19 0429     Color, UA Yellow     Appearance, UA Clear     pH, UA 6.0     Specific Gravity, UA 1.013     Glucose, UA Negative     Ketones, UA Negative     Bilirubin, UA Negative     Blood, UA Negative     Protein, UA Negative     Leuk Esterase, UA Negative     Nitrite, UA Negative     Urobilinogen, UA 0.2 E.U./dL    Narrative:       Urine microscopic not indicated.    CBC & Differential [[id number]] Collected:  12/10/19 2347    Specimen:  Blood Updated:  12/11/19 0241    Narrative:       The following orders were created for panel order CBC & Differential.  Procedure                               Abnormality         Status                     ---------                                -----------         ------                     CBC Auto Differential[[id number]]        Abnormal            Final result                 Please view results for these tests on the individual orders.    CBC Auto Differential [[id number]]  (Abnormal) Collected:  12/10/19 2347    Specimen:  Blood Updated:  12/11/19 0241     WBC 7.22 10*3/mm3      RBC 4.54 10*6/mm3      Hemoglobin 14.2 g/dL      Hematocrit 42.5 %      MCV 93.6 fL      MCH 31.3 pg      MCHC 33.4 g/dL      RDW 12.3 %      RDW-SD 42.3 fl      MPV 10.7 fL      Platelets 490 10*3/mm3      Neutrophil % 53.0 %      Lymphocyte % 29.8 %      Monocyte % 10.9 %      Eosinophil % 5.3 %      Basophil % 0.7 %      Immature Grans % 0.3 %      Neutrophils, Absolute 3.83 10*3/mm3      Lymphocytes, Absolute 2.15 10*3/mm3      Monocytes, Absolute 0.79 10*3/mm3      Eosinophils, Absolute 0.38 10*3/mm3      Basophils, Absolute 0.05 10*3/mm3      Immature Grans, Absolute 0.02 10*3/mm3      nRBC 0.0 /100 WBC     Basic Metabolic Panel [[id number]]  (Normal) Collected:  12/10/19 2347    Specimen:  Blood Updated:  12/11/19 0229     Glucose 77 mg/dL      BUN 14 mg/dL      Creatinine 0.60 mg/dL      Sodium 140 mmol/L      Potassium 4.2 mmol/L      Chloride 103 mmol/L      CO2 27.4 mmol/L      Calcium 9.2 mg/dL      eGFR Non African Amer 106 mL/min/1.73      BUN/Creatinine Ratio 23.3     Anion Gap 9.6 mmol/L     Narrative:       GFR Normal >60  Chronic Kidney Disease <60  Kidney Failure <15      hCG, Serum, Qualitative [[id number]]  (Normal) Collected:  12/10/19 2347    Specimen:  Blood Updated:  12/11/19 0224     HCG Qualitative Negative    [facility] Draw [[id number]] Collected:  12/10/19 2347    Specimen:  Blood Updated:  12/11/19 0100    Narrative:       The following orders were created for panel order [facility] Draw.  Procedure                               Abnormality         Status                     ---------                               -----------         ------                 "     Light Blue Top[[id number]]                                   Final result               Green Top (Gel)[[id number]]                                  Final result               Lavender Top[[id number]]                                     Final result               Gold Top - SST[[id number]]                                   Final result                 Please view results for these tests on the individual orders.    Green Top (Gel) [[id number]] Collected:  12/10/19 2347    Specimen:  Blood Updated:  12/11/19 0100     Extra Tube Hold for add-ons.     Comment: Auto resulted.       Gold Top - SST [[id number]] Collected:  12/10/19 2347    Specimen:  Blood Updated:  12/11/19 0100     Extra Tube Hold for add-ons.     Comment: Auto resulted.       Light Blue Top [[id number]] Collected:  12/10/19 2347    Specimen:  Blood Updated:  12/11/19 0100     Extra Tube hold for add-on     Comment: Auto resulted       Lavender Top [[id number]] Collected:  12/10/19 2347    Specimen:  Blood Updated:  12/11/19 0100     Extra Tube hold for add-on     Comment: Auto resulted           /73 (BP Location: Right arm, Patient Position: Lying)   Pulse 84   Temp 97 °F (36.1 °C) (Oral)   Resp 16   Ht 154.9 cm (60.98\")   Wt 54.1 kg (119 lb 3.2 oz)   SpO2 100%   Breastfeeding No   BMI 22.50 kg/m²     Discharge Exam:  General Appearance:    Alert, cooperative, no distress                          Head:    Normocephalic, without obvious abnormality, atraumatic                          Eyes:                            Throat:   Lips, tongue, gums normal                          Neck:   Supple, symmetrical, trachea midline, no JVD                        Lungs:     Clear to auscultation bilaterally, respirations unlabored                Chest Wall:    No tenderness or deformity                        Heart:    Regular rate and rhythm, S1 and S2 normal, no murmur,no  Rub or gallop                  Abdomen:     Soft, non-tender, bowel sounds " active, no masses, no organomegaly                  Extremities:   Extremities normal, atraumatic, no cyanosis or edema                             Skin:   Skin is warm and dry,  no rashes or palpable lesions                  Neurologic: Right leg weakness     Disposition:  Home    Patient Instructions:      Discharge Medications      Changes to Medications      Instructions Start Date   HYDROcodone-acetaminophen 7.5-325 MG per tablet  Commonly known as:  NORCO  What changed:    · when to take this  · reasons to take this   1 tablet, Oral, Every 4 Hours PRN         Continue These Medications      Instructions Start Date   ALPRAZolam 0.5 MG tablet  Commonly known as:  XANAX   No dose, route, or frequency recorded.      ARMOUR THYROID 15 MG tablet  Generic drug:  thyroid   No dose, route, or frequency recorded.      ARMOUR THYROID 60 MG tablet  Generic drug:  Thyroid   60 mg      celecoxib 200 MG capsule  Commonly known as:  CELEBREX   200 mg, Oral, Daily      cetirizine 5 MG tablet  Commonly known as:  zyrTEC   5 mg, Oral      cyclobenzaprine 10 MG tablet  Commonly known as:  FLEXERIL   TAKE 1 TABLET BY MOUTH 3 (THREE) TIMES DAILY AS NEEDED FOR MUSCLE SPASMS FOR UP TO 14 DAYS.      FISH OIL + D3 PO   Oral      GRALISE 300 MG tablet  Generic drug:  Gabapentin (Once-Daily)   TAKE 1 TABLET BY MOUTH AT BEDTIME FOR 7 DAYS, THEN TAKE 2 TABLETS...  (REFER TO PRESCRIPTION NOTES).      IRON PO   Oral      metFORMIN  MG 24 hr tablet  Commonly known as:  GLUCOPHAGE-XR   500 mg, Oral, Every Morning      multivitamin with fluoride/iron 0.25-10 MG/ML solution solution   Oral, Daily      progesterone 200 MG capsule  Commonly known as:  PROMETRIUM   200 mg, Oral, Daily      rosuvastatin 5 MG tablet  Commonly known as:  CRESTOR   No dose, route, or frequency recorded.      traMADol 50 MG tablet  Commonly known as:  ULTRAM   50 mg, Oral, Every 6 Hours PRN      VYVANSE 50 MG capsule  Generic drug:  lisdexamfetamine   50 mg, Oral,  Daily         Stop These Medications    amoxicillin-clavulanate 875-125 MG per tablet  Commonly known as:  AUGMENTIN     cephalexin 500 MG capsule  Commonly known as:  KEFLEX     clotrimazole-betamethasone 1-0.05 % cream  Commonly known as:  LOTRISONE     dexamethasone 4 MG tablet  Commonly known as:  DECADRON     nystatin 003875 UNIT/GM ointment  Commonly known as:  MYCOSTATIN          No future appointments.  Follow-up Information     Serafin Carrasco MD Follow up.    Specialty:  Orthopedic Surgery  Contact information:  [address] [facility] [id number] [phone]             Rene Rivera MD Follow up.    Specialty:  Family Medicine  Contact information:  [address] [facility] [address]  [facility] [address]  [phone]                 Discharge Order (From admission, onward)     Start     Ordered    12/13/19 1047  Discharge patient  Once     Expected Discharge Date:  12/13/19    Discharge Disposition:  Home or Self Care    Physician of Record for Attribution - Please select from Treatment Team:  RICKY OBREGON [[id number]]    Review needed by CMO to determine Physician of Record:  No       Question Answer Comment   Physician of Record for Attribution - Please select from Treatment Team RICKY OBREGON    Review needed by CMO to determine Physician of Record No        12/13/19 1049                Total time spent discharging patient including evaluation,post hospitalization follow up,  medication and post hospitalization instructions and education total time exceeds 30 minutes.    Signed:  Ricky Obregon MD  12/13/2019  10:50 AM

## 2019-12-13 NOTE — PLAN OF CARE
Problem: Patient Care Overview  Goal: Plan of Care Review  Outcome: Ongoing (interventions implemented as appropriate)  Flowsheets (Taken 12/13/2019 0429)  Progress: no change  Plan of Care Reviewed With: patient  Note:   Frequently asked for pain ,eds.  Lortab x1 , several doses of Dilaudid given.  C/o right leg numbness.

## 2019-12-13 NOTE — PROGRESS NOTES
Discharge Planning Assessment  [facility]     Patient Name: Katia Puente  MRN: [id number]  Today's Date: 12/13/2019    Admit Date: 12/10/2019      Discharge Plan     Row Name 12/13/19 1127       Plan    Plan Comments  Discharge order.  No discharge needs identified. Pt declined hospital follow up with PCP as she will be seeing her surgeon next week in order to have surgery.        Final Discharge Disposition Code  01 - home or self-care     Expected Discharge Date and Time     Expected Discharge Date Expected Discharge Time    Dec 13, 2019      Carley Hernández RN

## 2019-12-16 ENCOUNTER — TRANSITIONAL CARE MANAGEMENT TELEPHONE ENCOUNTER (OUTPATIENT)
Dept: FAMILY MEDICINE CLINIC | Facility: CLINIC | Age: 49
End: 2019-12-16

## 2019-12-16 NOTE — PAYOR COMM NOTE
"Katia Puente (49 y.o. Female)     ATTN: BRENT   REF#[id number]  CLINICALS AND D/C SUMMARY  PLEASE CALL BACK TO DIOMEDES [phone] OR [phone]  THANKS!   DIOMEDES      Date of Birth Social Security Number Address Home Phone MRN    1970  [id number] [facility] [address] [phone] [id number]    Shinto Marital Status          Zoroastrianism        Admission Date Admission Type Admitting Provider Attending Provider Department, Room/Bed    12/10/19 Emergency Jagjit Sánchez MD  [address], P689/1    Discharge Date Discharge Disposition Discharge Destination        12/13/2019 Home or Self Care              Attending Provider:  (none)   Allergies:  Codeine, Erythromycin    Isolation:  None   Infection:  None   Code Status:  Prior    Ht:  154.9 cm (60.98\")   Wt:  54.1 kg (119 lb 3.2 oz)    Admission Cmt:  None   Principal Problem:  Intractable pain [R52]                 Active Insurance as of 12/10/2019     Primary Coverage     Payor Plan Insurance Group Employer/Plan Group    [employer] BLUE CROSS [employer] BLUE CROSS BLUE [employer]IELD PPO [id number]     Payor Plan Address Payor Plan Phone Number Payor Plan Fax Number Effective Dates    [address] [phone]  1/1/2019 - None Entered    [place] [address]       Subscriber Name Subscriber Birth Date Member ID       GUSTAVO PUENTE 3/12/1966 [id number]                 Emergency Contacts      (Rel.) Home Phone Work Phone Mobile Phone    Ulisses Puente (Spouse) [phone] -- [phone]                Imaging Results (Last 7 Days)     Procedure Component Value Units Date/Time    DEXA Bone Density Axial [[id number]] Collected:  12/12/19 1634     Updated:  12/12/19 1647    Narrative:       BONE MINERAL DENSITOMETRY     HISTORY: Perimenopausal. Failed back surgery. Baseline exam.     COMPARISON: None.     TECHNIQUE: The T score compares the patient's bone mineral density with  the peak bone " mass of young normal patients. Patients with T-scores  between 1.0 and 2.5 standard deviations below the mean are osteopenic.  Patients with T-scores greater than 2.5 standard deviation below the  mean are osteoporotic.     The Z score compares the patient's bone mineral density with sex and age  matched patients. Z score of -2.0 and lower is an indication of low  mineral density for the patient's age.     FINDINGS:   LUMBAR SPINE:  The BMD measured in the L1 through L4 is 0.843 g/cm2 for  a T-score of -1.9 and a Z-score of -1.2.     LEFT HIP: The BMD for the femoral neck is 0.671g/cm2 for a T score of   -1.6 and a Z score of -0.9.     RIGHT HIP:  The BMD for the femoral neck is 0.648 g/cm2 for a T score of  -1.8 and a Z score of -1.1.       Impression:       If the patient is considered to be postmenopausal, the bone  density T-scores indicate osteopenia and the 10-year fracture risk for a  major osteoarthritic fracture is 4.2% and for a hip fracture 0.4%.     This report was finalized on 12/12/2019 4:44 PM by Dr. Jelani Anaya M.D.       IR Myelogram Lumbar Spine [[id number]] Collected:  12/12/19 0831     Updated:  12/12/19 0904    Narrative:       PROCEDURE: IR MYELOGRAM LUMBAR SPINE-, CT LUMBAR SPINE W INTRATHECAL  CONTRAST-performed on 12/11/2019.     HISTORY: 49-year-old female with low back pain and right-sided groin  pain and right leg pain. Patient status post laminectomy 6 weeks ago.  Lumbar pain, Rt leg weakness after spine surgery in Oct 2019; R52-Pain,  unspecified; M54.41-Lumbago with sciatica, right side;  M54.10-Radiculopathy, site unspecified     TECHNIQUE: Thin section axial CT with sagittal reconstructions following  intrathecal contrast administration at the L2-3 level via a 22-gauge  spinal needle. 13 cc of Isovue M 200 were injected. Radiographs were  then obtained prior to CT scanning.      FLUOROSCOPY TIME: 1.03 minutes, 18 images.     FINDINGS: No fracture is present. Alignment is  abnormal with a  thoracolumbar scoliosis present including concavity to the left and  convex to the to the right centered at approximately L1.      T12-L1: There is mild disc space narrowing present. Conus medullaris  terminates at the T12 level without enlargement. A left paracentral disc  protrusion is seen which flattens the anterior thecal sac, but does not  cause significant spinal stenosis. No facet hypertrophy is appreciated.     L1-L2: Mild left paracentral disc protrusion which flattens the anterior  thecal sac, but does not cause any significant spinal stenosis. No facet  hypertrophy identified.     L2-L3: No significant disc disease is present. There is no canal  stenosis. A right-sided small nerve root mass is seen which is most  likely a schwannoma. A conjoined root may also give this appearance.     L3-L4: No significant disc disease is present. There is no canal  stenosis. A right-sided small nerve root mass is seen which again is  most likely a schwannoma or conjoined root.     L4-L5: There is disc space narrowing with a broad-based disc herniation  that impinges on the thecal sac and causes some mild spinal stenosis.  Mild bilateral facet arthrosis.      L5-S1: There is disc space narrowing with a vacuum phenomenon present  and a central disc protrusion but no significant impingement on the  thecal sac. Mild bilateral facet arthrosis.       Impression:       Thoracolumbar scoliosis with the some degenerative disc  changes most significant at the L4-5 level. Possible nerve root  schwannomas as described above.      Radiation dose reduction techniques were utilized, including automated  exposure control and exposure modulation based on body size.        This report was finalized on 12/12/2019 9:01 AM by Dr. Benson Pradhan MD.       CT Lumbar Spine With Intrathecal Contrast [[id number]] Collected:  12/12/19 0831     Updated:  12/12/19 0904    Narrative:       PROCEDURE: IR MYELOGRAM LUMBAR SPINE-, CT  LUMBAR SPINE W INTRATHECAL  CONTRAST-performed on 12/11/2019.     HISTORY: 49-year-old female with low back pain and right-sided groin  pain and right leg pain. Patient status post laminectomy 6 weeks ago.  Lumbar pain, Rt leg weakness after spine surgery in Oct 2019; R52-Pain,  unspecified; M54.41-Lumbago with sciatica, right side;  M54.10-Radiculopathy, site unspecified     TECHNIQUE: Thin section axial CT with sagittal reconstructions following  intrathecal contrast administration at the L2-3 level via a 22-gauge  spinal needle. 13 cc of Isovue M 200 were injected. Radiographs were  then obtained prior to CT scanning.      FLUOROSCOPY TIME: 1.03 minutes, 18 images.     FINDINGS: No fracture is present. Alignment is abnormal with a  thoracolumbar scoliosis present including concavity to the left and  convex to the to the right centered at approximately L1.      T12-L1: There is mild disc space narrowing present. Conus medullaris  terminates at the T12 level without enlargement. A left paracentral disc  protrusion is seen which flattens the anterior thecal sac, but does not  cause significant spinal stenosis. No facet hypertrophy is appreciated.     L1-L2: Mild left paracentral disc protrusion which flattens the anterior  thecal sac, but does not cause any significant spinal stenosis. No facet  hypertrophy identified.     L2-L3: No significant disc disease is present. There is no canal  stenosis. A right-sided small nerve root mass is seen which is most  likely a schwannoma. A conjoined root may also give this appearance.     L3-L4: No significant disc disease is present. There is no canal  stenosis. A right-sided small nerve root mass is seen which again is  most likely a schwannoma or conjoined root.     L4-L5: There is disc space narrowing with a broad-based disc herniation  that impinges on the thecal sac and causes some mild spinal stenosis.  Mild bilateral facet arthrosis.      L5-S1: There is disc space  narrowing with a vacuum phenomenon present  and a central disc protrusion but no significant impingement on the  thecal sac. Mild bilateral facet arthrosis.       Impression:       Thoracolumbar scoliosis with the some degenerative disc  changes most significant at the L4-5 level. Possible nerve root  schwannomas as described above.      Radiation dose reduction techniques were utilized, including automated  exposure control and exposure modulation based on body size.        This report was finalized on 12/12/2019 9:01 AM by Dr. Benson Pradhan MD.       MRI Lumbar Spine With & Without Contrast [[id number]] Collected:  12/11/19 1622     Updated:  12/11/19 1626    Narrative:       MRI EXAMINATION OF THE LUMBAR SPINE WITH AND WITHOUT CONTRAST     HISTORY:  Back pain, right radiculopathy. Previous lumbar surgery.     COMPARISON: MRI lumbar spine 05/18/2018.     FINDINGS: There is dextroscoliosis of the lumbar spine with the apex at  the level of L3. There is moderate loss of disc height and disc  desiccation at L5-S1 and to a lesser extent L4-L5 similar in appearance  as compared to the MRI examination of 05/18/2018.     L1-L2: There is a left lateral disc osteophyte complex resulting in mild  lateral recess narrowing similar in appearance as compared to the prior  examination.     L2-3: Mild facet degenerative disease is present bilaterally. There is  no evidence of disc bulge or herniation.     L3-L4: Mild facet degenerative disease is present bilaterally. There is  no evidence of disc bulge or herniation.     L4-L5: A right laminotomy and partial medial facetectomy is noted. There  is a central disc osteophyte complex including a central disc protrusion  present previously. There is signal loss anterior lateral to the thecal  sac to the right in the epidural space T1 sequence. This area enhances  after contrast administration with the exception of a small focus of  nonenhancement suspicious for a small superiorly  directed disc  protrusion or herniation (also confirmed on the sagittal T1 postcontrast  sequence).  The superiorly directed disc protrusion/herniation  approaches but does not definitively involve the exiting right L4 nerve.  It is new versus the MRI examination of 05/18/2018. The enhancing tissue  is consistent with granulation tissue and abuts the traversing right L5  nerve root. There is mild lateral recess narrowing on the right. Mild  neuroforaminal compromise is noted on the right secondary to loss of  disc height and extension of a small disc osteophyte complex into the  neural foramen.     L5-S1: A right laminotomy is noted. Enhancing tissue is identified  adjacent to the traversing S1 nerve root consistent with granulation  tissue. There is no evidence of a disc herniation or disc fragment. Mild  neuroforaminal compromise is present on the right secondary to loss of  disc height and extension of a small disc osteophyte complex into the  neural foramen.       Impression:       The patient is status post right laminotomies and partial  medial facetectomies at L4-L5 and L5-S1. There is enhancing tissue  consistent with granulation tissue at each level anterolateral, lateral  and posteriolateral to the thecal sac to the right abutting the  traversing nerve roots. At L4-L5, a small focus of nonenhancement is  identified which was not present on 05/18/2018 and represents a small  superiorly directed disc protrusion or herniation laterally to the  right. This approaches but does not definitively involve the exiting L4  nerve. There is mild lateral recess narrowing on the right at L4-L5 and  a mild degree of canal stenosis secondary to a central disc osteophyte  complex which is slightly more prominent to the left of midline. Should  a more recent MRI examination of the lumbar spine be made available for  comparison, I would be more than happy to compare the studies and to  generate a supplemental report.     This  "report was finalized on 2019 4:23 PM by Dr. Zachary Naik M.D.              Physician Progress Notes (last 7 days) (Notes from 19 1144 through 19 1144)      Ricky Obregon MD at 19 1042          DAILY PROGRESS NOTE  [facility]    Patient Identification:  Name: Katia Puente  Age: 49 y.o.  Sex: female  :  1970  MRN: [id number]         Primary Care Physician: Rene Rivera MD    Subjective:  Interval History:She complains of back pain and right leg pain.    Objective:    Scheduled Meds:  cetirizine 5 mg Oral Daily   metFORMIN  mg Oral QAM   progesterone 200 mg Oral Daily   rosuvastatin 5 mg Oral Daily   sodium chloride 10 mL Intravenous Q12H   thyroid 15 mg Oral QAM AC   Thyroid 60 mg Oral QAM AC     Continuous Infusions:  sodium chloride 75 mL/hr Last Rate: 75 mL/hr (19 0330)       Vital signs in last 24 hours:  Temp:  [97 °F (36.1 °C)-98.4 °F (36.9 °C)] 97.3 °F (36.3 °C)  Heart Rate:  [61-85] 77  Resp:  [16] 16  BP: (110-138)/(72-82) 138/82    Intake/Output:    Intake/Output Summary (Last 24 hours) at 2019 1043  Last data filed at 2019 0915  Gross per 24 hour   Intake 910 ml   Output 2300 ml   Net -1390 ml       Exam:  /82 (BP Location: Right arm, Patient Position: Sitting)   Pulse 77   Temp 97.3 °F (36.3 °C)   Resp 16   Ht 154.9 cm (60.98\")   Wt 54.1 kg (119 lb 3.2 oz)   SpO2 98%   Breastfeeding No   BMI 22.53 kg/m²      General Appearance:    Alert, cooperative, no distress   Head:    Normocephalic, without obvious abnormality, atraumatic   Eyes:       Throat:   Lips, tongue, gums normal   Neck:   Supple, symmetrical, trachea midline, no JVD   Lungs:     Clear to auscultation bilaterally, respirations unlabored   Chest Wall:    No tenderness or deformity    Heart:    Regular rate and rhythm, S1 and S2 normal, no murmur,no  Rub or gallop   Abdomen:     Soft, non-tender, bowel sounds active, no masses, no organomegaly  "   Extremities:   Extremities normal, atraumatic, no cyanosis or edema   Pulses:      Skin:   Skin is warm and dry,  no rashes or palpable lesions   Neurologic:   Right leg weakness      Lab Results (last 72 hours)     Procedure Component Value Units Date/Time    Basic Metabolic Panel [[id number]]  (Abnormal) Collected:  12/12/19 0451    Specimen:  Blood Updated:  12/12/19 0557     Glucose 102 mg/dL      BUN 10 mg/dL      Creatinine 0.64 mg/dL      Sodium 138 mmol/L      Potassium 4.3 mmol/L      Chloride 103 mmol/L      CO2 26.8 mmol/L      Calcium 8.9 mg/dL      eGFR Non African Amer 99 mL/min/1.73      BUN/Creatinine Ratio 15.6     Anion Gap 8.2 mmol/L     Narrative:       GFR Normal >60  Chronic Kidney Disease <60  Kidney Failure <15      CBC & Differential [[id number]] Collected:  12/12/19 0451    Specimen:  Blood Updated:  12/12/19 0531    Narrative:       The following orders were created for panel order CBC & Differential.  Procedure                               Abnormality         Status                     ---------                               -----------         ------                     CBC Auto Differential[[id number]]        Abnormal            Final result                 Please view results for these tests on the individual orders.    CBC Auto Differential [[id number]]  (Abnormal) Collected:  12/12/19 0451    Specimen:  Blood Updated:  12/12/19 0531     WBC 6.73 10*3/mm3      RBC 4.05 10*6/mm3      Hemoglobin 12.9 g/dL      Hematocrit 38.4 %      MCV 94.8 fL      MCH 31.9 pg      MCHC 33.6 g/dL      RDW 12.1 %      RDW-SD 42.2 fl      MPV 10.0 fL      Platelets 383 10*3/mm3      Neutrophil % 58.2 %      Lymphocyte % 23.8 %      Monocyte % 11.7 %      Eosinophil % 5.3 %      Basophil % 0.6 %      Immature Grans % 0.4 %      Neutrophils, Absolute 3.91 10*3/mm3      Lymphocytes, Absolute 1.60 10*3/mm3      Monocytes, Absolute 0.79 10*3/mm3      Eosinophils, Absolute 0.36 10*3/mm3      Basophils, Absolute  0.04 10*3/mm3      Immature Grans, Absolute 0.03 10*3/mm3      nRBC 0.0 /100 WBC     Body Fluid Cell Count With Differential - Cerebrospinal Fluid, Spine, Lumbar [[id number]] Collected:  12/11/19 1614    Specimen:  Cerebrospinal Fluid from Spine, Lumbar Updated:  12/11/19 1823    Narrative:       The following orders were created for panel order Body Fluid Cell Count With Differential - Cerebrospinal Fluid, Spine, Lumbar.  Procedure                               Abnormality         Status                     ---------                               -----------         ------                     Body fluid cell count - ...[[id number]]                      Final result                 Please view results for these tests on the individual orders.    Body fluid cell count - Cerebrospinal Fluid, Spine, Lumbar [[id number]] Collected:  12/11/19 1614    Specimen:  Cerebrospinal Fluid from Spine, Lumbar Updated:  12/11/19 1823     Color, Fluid Colorless     Comment: Tube #1        Appearance, Fluid Clear     WBC, Fluid 0 /mm3      RBC, Fluid 4 /mm3     Narrative:       Differential not indicated.    Basic Metabolic Panel [[id number]]  (Normal) Collected:  12/11/19 0618    Specimen:  Blood Updated:  12/11/19 0704     Glucose 94 mg/dL      BUN 11 mg/dL      Creatinine 0.65 mg/dL      Sodium 140 mmol/L      Potassium 4.0 mmol/L      Chloride 104 mmol/L      CO2 25.6 mmol/L      Calcium 8.9 mg/dL      eGFR Non African Amer 97 mL/min/1.73      BUN/Creatinine Ratio 16.9     Anion Gap 10.4 mmol/L     Narrative:       GFR Normal >60  Chronic Kidney Disease <60  Kidney Failure <15      CBC (No Diff) [[id number]]  (Normal) Collected:  12/11/19 0618    Specimen:  Blood Updated:  12/11/19 0633     WBC 7.03 10*3/mm3      RBC 4.22 10*6/mm3      Hemoglobin 13.1 g/dL      Hematocrit 39.3 %      MCV 93.1 fL      MCH 31.0 pg      MCHC 33.3 g/dL      RDW 12.5 %      RDW-SD 42.4 fl      MPV 9.6 fL      Platelets 420 10*3/mm3     Urinalysis With  Microscopic If Indicated (No Culture) - Urine, Clean Catch [[id number]]  (Normal) Collected:  12/11/19 0408    Specimen:  Urine, Clean Catch Updated:  12/11/19 0429     Color, UA Yellow     Appearance, UA Clear     pH, UA 6.0     Specific Gravity, UA 1.013     Glucose, UA Negative     Ketones, UA Negative     Bilirubin, UA Negative     Blood, UA Negative     Protein, UA Negative     Leuk Esterase, UA Negative     Nitrite, UA Negative     Urobilinogen, UA 0.2 E.U./dL    Narrative:       Urine microscopic not indicated.    CBC & Differential [[id number]] Collected:  12/10/19 2347    Specimen:  Blood Updated:  12/11/19 0241    Narrative:       The following orders were created for panel order CBC & Differential.  Procedure                               Abnormality         Status                     ---------                               -----------         ------                     CBC Auto Differential[[id number]]        Abnormal            Final result                 Please view results for these tests on the individual orders.    CBC Auto Differential [[id number]]  (Abnormal) Collected:  12/10/19 2347    Specimen:  Blood Updated:  12/11/19 0241     WBC 7.22 10*3/mm3      RBC 4.54 10*6/mm3      Hemoglobin 14.2 g/dL      Hematocrit 42.5 %      MCV 93.6 fL      MCH 31.3 pg      MCHC 33.4 g/dL      RDW 12.3 %      RDW-SD 42.3 fl      MPV 10.7 fL      Platelets 490 10*3/mm3      Neutrophil % 53.0 %      Lymphocyte % 29.8 %      Monocyte % 10.9 %      Eosinophil % 5.3 %      Basophil % 0.7 %      Immature Grans % 0.3 %      Neutrophils, Absolute 3.83 10*3/mm3      Lymphocytes, Absolute 2.15 10*3/mm3      Monocytes, Absolute 0.79 10*3/mm3      Eosinophils, Absolute 0.38 10*3/mm3      Basophils, Absolute 0.05 10*3/mm3      Immature Grans, Absolute 0.02 10*3/mm3      nRBC 0.0 /100 WBC     Basic Metabolic Panel [[id number]]  (Normal) Collected:  12/10/19 2347    Specimen:  Blood Updated:  12/11/19 0229     Glucose 77 mg/dL       BUN 14 mg/dL      Creatinine 0.60 mg/dL      Sodium 140 mmol/L      Potassium 4.2 mmol/L      Chloride 103 mmol/L      CO2 27.4 mmol/L      Calcium 9.2 mg/dL      eGFR Non African Amer 106 mL/min/1.73      BUN/Creatinine Ratio 23.3     Anion Gap 9.6 mmol/L     Narrative:       GFR Normal >60  Chronic Kidney Disease <60  Kidney Failure <15      hCG, Serum, Qualitative [[id number]]  (Normal) Collected:  12/10/19 2347    Specimen:  Blood Updated:  12/11/19 0224     HCG Qualitative Negative    [place] Draw [[id number]] Collected:  12/10/19 2347    Specimen:  Blood Updated:  12/11/19 0100    Narrative:       The following orders were created for panel order [place] Draw.  Procedure                               Abnormality         Status                     ---------                               -----------         ------                     Light Blue Top[[id number]]                                   Final result               Green Top (Gel)[[id number]]                                  Final result               Lavender Top[[id number]]                                     Final result               Gold Top - SST[[id number]]                                   Final result                 Please view results for these tests on the individual orders.    Green Top (Gel) [[id number]] Collected:  12/10/19 2347    Specimen:  Blood Updated:  12/11/19 0100     Extra Tube Hold for add-ons.     Comment: Auto resulted.       Gold Top - SST [[id number]] Collected:  12/10/19 2347    Specimen:  Blood Updated:  12/11/19 0100     Extra Tube Hold for add-ons.     Comment: Auto resulted.       Light Blue Top [[id number]] Collected:  12/10/19 2347    Specimen:  Blood Updated:  12/11/19 0100     Extra Tube hold for add-on     Comment: Auto resulted       Lavender Top [[id number]] Collected:  12/10/19 2347    Specimen:  Blood Updated:  12/11/19 0100     Extra Tube hold for add-on     Comment: Auto resulted           Data Review:  Results from last 7  days   Lab Units 12/12/19  0451 12/11/19  0618 12/10/19  2347   SODIUM mmol/L 138 140 140   POTASSIUM mmol/L 4.3 4.0 4.2   CHLORIDE mmol/L 103 104 103   CO2 mmol/L 26.8 25.6 27.4   BUN mg/dL 10 11 14   CREATININE mg/dL 0.64 0.65 0.60   GLUCOSE mg/dL 102* 94 77   CALCIUM mg/dL 8.9 8.9 9.2     Results from last 7 days   Lab Units 12/12/19  0451 12/11/19  0618 12/10/19  2347   WBC 10*3/mm3 6.73 7.03 7.22   HEMOGLOBIN g/dL 12.9 13.1 14.2   HEMATOCRIT % 38.4 39.3 42.5   PLATELETS 10*3/mm3 383 420 490*             Lab Results   Lab Value Date/Time    TROPONINT <0.010 05/05/2019 1334    TROPONINT <0.010 05/05/2019 1114         Results from last 7 days   Lab Units 12/08/19  2349   ALK PHOS U/L 51   BILIRUBIN mg/dL <0.2*   ALT (SGPT) U/L 34*   AST (SGOT) U/L 26             No results found for: POCGLU        Past Medical History:   Diagnosis Date   • Allergic    • Breast pain, left    • Elevated cholesterol    • Hemorrhoids 05/02/2012   • Hypothyroidism    • Murmur    • Vaginal atrophy        Assessment:  Active Hospital Problems    Diagnosis  POA   • **Intractable pain [R52]  Yes   • Acute midline low back pain with right-sided sciatica [M54.41]  Unknown   • Hypothyroidism [E03.9]  Yes      Resolved Hospital Problems   No resolved problems to display.       Plan:  Will ask for PT consult and consult neurosurgery wants opinion from Екатерина.  Give some solumedrol IV.    Ricky Obregon MD  12/12/2019  10:43 AM      Electronically signed by Ricky Obregon MD at 12/12/19 1047          Consult Notes (last 7 days) (Notes from 12/09/19 through 12/16/19)      Malina Jeffrey PA-C at 12/12/19 1412      Consult Orders    1. Inpatient Neurosurgery Consult [[id number]] ordered by Ricky Obregon MD at 12/12/19 1040           Attestation signed by Markell Martinez MD at 12/12/19 3639    I have reviewed the documentation above and agree.                    Inpatient Neurosurgery Consult  Consult performed by: Malina Jeffrey,  GHADA  Consult ordered by: Ricky Obregon MD  Reason for consult: back and R leg pain  Assessment/Recommendations: Ms. Puente is a 49 yr old female with PMH hypothyroidism. She saw Dr. Yu in 2018 for back and L leg pain and discussed surgery but ended up going [place] of [place] and had a L4-5 discectomy in [facility] in 2018. She then developed recurrent back and R leg pain earlier this year. Injections and therapy failed to help. She saw Dr. Martinez in October 2019 as a second opinion after Dr. Carrasco recommended a L4-5 fusion for a recurrent L4-5 disc herniation. Dr. Martinez discussed possible surgery with her and recommended a myelogram. She did not proceed with the myelogram and ended up going back to Dr. Carrasco and had a reop L4-5 microdisectomy 6wks ago. She states that it helped and she did well for about 3wks and then the back and R pelvic/lateral leg pain returned. No bowel or bladder incontinence, no focal weakness.  She has now had a new MRI and myelogram that I have reviewed with Dr. Martinez. It does show some mild stenosis and mild to moderate foraminal narrowing at L4-5.    Her exam does not reveal any focal weakness.    I reviewed the imaging with the patient and her . I explained that there is some foraminal narrowing that could explain her radicular pain. It would not be unreasonable to consider a decompression/fusion at that level. Given that it would be her 3rd lami at that level a fusion would absolutely required. However, given that she is only 6wks out from her recent surgery and there are no emergent needs (no infection, no severe stenosis or cauda equina syndrome, no weakness) we will not assume her care. She will need to call Dr. Carrasco and see when she can get back in to see him. From what the  said Dr. Obregon has spoken with him and he already has a copy of the recent MRI and myelogram to review.     I will S/O. Call as needed.           Patient Care Team:  Rene Rivera MD as PCP  - General  Rene Rivera MD as PCP - Family Medicine  Yasmin Mccall MA as Medical Assistant    Chief complaint:back and R leg pain    Subjective     L spine MRI and myelogram reviewed and discussed with Dr. aMrtinez. Description above      ..Lab             12/12/19                       0451          WBC          6.73          HEMOGLOBIN   12.9          HEMATOCRIT   38.4          PLATELETS    383             ..Lab             12/12/19                       0451          SODIUM       138           POTASSIUM    4.3           CHLORIDE     103           CO2          26.8          BUN          10            CREATININE   0.64          GLUCOSE      102*          CALCIUM      8.9                 Back Pain   This is a recurrent problem. The current episode started 1 to 4 weeks ago. The problem occurs constantly. The problem is unchanged. The pain is present in the lumbar spine. The pain is at a severity of 8/10. The pain is severe. Associated symptoms include leg pain and pelvic pain. Pertinent negatives include no bladder incontinence, bowel incontinence, numbness, perianal numbness, tingling or weakness. The treatment provided no relief.       Review of Systems   Gastrointestinal: Negative for bowel incontinence.   Genitourinary: Positive for pelvic pain. Negative for bladder incontinence and difficulty urinating.   Musculoskeletal: Positive for back pain.   Neurological: Negative for tingling, weakness and numbness.   All other systems reviewed and are negative.       Past Medical History:   Diagnosis Date   • Allergic    • Breast pain, left    • Elevated cholesterol    • Hemorrhoids 05/02/2012   • Hypothyroidism    • Murmur    • Vaginal atrophy    ,   Past Surgical History:   Procedure Laterality Date   • BACK SURGERY      L4, L5 discectomy   • BREAST SURGERY     • CHOLECYSTECTOMY     • ENDOSCOPY  01/2013   • GALLBLADDER SURGERY  2016   • HEMORRHOIDECTOMY     • HYSTERECTOMY  2008   • LASIK  1998   • TONSILLECTOMY      ,   Family History   Problem Relation Age of Onset   • Heart disease Mother    • Heart disease Father    • Diabetes Paternal Aunt    • Stroke Maternal Grandmother    • Cancer Paternal Grandfather    ,   Social History     Tobacco Use   • Smoking status: Never Smoker   • Smokeless tobacco: Never Used   Substance Use Topics   • Alcohol use: Yes     Comment: current some day; occ   • Drug use: No   ,   Medications Prior to Admission   Medication Sig Dispense Refill Last Dose   • ALPRAZolam (XANAX) 0.5 MG tablet    Past Week at Unknown time   • ARMOUR THYROID 15 MG tablet    12/10/2019 at Unknown time   • ARMOUR THYROID 60 MG PO tablet 60 mg.   12/10/2019 at Unknown time   • cephalexin (KEFLEX) 500 MG capsule Take 1 capsule by mouth 2 (Two) Times a Day. 14 capsule 0 12/10/2019 at Unknown time   • cetirizine (zyrTEC) 5 MG tablet Take 5 mg by mouth.   12/10/2019 at Unknown time   • cyclobenzaprine (FLEXERIL) 10 MG tablet TAKE 1 TABLET BY MOUTH 3 (THREE) TIMES DAILY AS NEEDED FOR MUSCLE SPASMS FOR UP TO 14 DAYS.  0 Past Week at Unknown time   • Fish Oil-Cholecalciferol (FISH OIL + D3 PO) Take  by mouth.   12/10/2019 at Unknown time   • GRALISE 300 MG tablet TAKE 1 TABLET BY MOUTH AT BEDTIME FOR 7 DAYS, THEN TAKE 2 TABLETS...  (REFER TO PRESCRIPTION NOTES).  0 12/10/2019 at Unknown time   • HYDROcodone-acetaminophen (NORCO) 7.5-325 MG per tablet Take 1 tablet by mouth Every 6 (Six) Hours As Needed for Severe Pain . 8 tablet 0 12/10/2019 at Unknown time   • IRON PO Take  by mouth.   Past Week at Unknown time   • metFORMIN ER (GLUCOPHAGE-XR) 500 MG 24 hr tablet Take 500 mg by mouth Every Morning.  0 12/10/2019 at Unknown time   • Ped Multivitamins-Fl-Iron (MULTIVITAMIN WITH FLUORIDE/IRON) 0.25-10 MG/ML solution solution Take  by mouth daily.   Past Week at Unknown time   • progesterone (PROMETRIUM) 200 MG capsule Take 200 mg by mouth Daily.   Patient Taking Differently at Unknown time   • rosuvastatin (CRESTOR) 5 MG tablet     Past Week at Unknown time   • traMADol (ULTRAM) 50 MG tablet Take 1 tablet by mouth Every 6 (Six) Hours As Needed for Moderate Pain . 30 tablet 0 12/10/2019 at Unknown time   • VYVANSE 50 MG capsule Take 50 mg by mouth Daily  0 12/10/2019 at Unknown time   • amoxicillin-clavulanate (AUGMENTIN) 875-125 MG per tablet Take 1 tablet by mouth Every 12 (Twelve) Hours. 14 tablet 0 More than a month at Unknown time   • celecoxib (CELEBREX) 200 MG capsule Take 1 capsule by mouth Daily. (Patient not taking: Reported on 12/11/2019) 30 capsule 5 Not Taking at Unknown time   • clotrimazole-betamethasone (LOTRISONE) 1-0.05 % cream Apply to affected area twice daily 7 days, then 3 times a week 45 g 6 Unknown at Unknown time   • dexamethasone (DECADRON) 4 MG tablet Take 2 tablets by mouth Take As Directed. Take both tablets by mouth 2 hours before myelogram 2 tablet 0 Unknown at Unknown time   • nystatin (MYCOSTATIN) 859030 UNIT/GM ointment 1 APPLICATION TO AFFECTED SKIN TOPICALLY 3 TIMES PER DAY FOR 7 DAY(S)  0 Unknown at Unknown time   , Scheduled Meds:    cetirizine 5 mg Oral Daily   metFORMIN  mg Oral QAM   methylPREDNISolone sodium succinate 80 mg Intravenous Q8H   progesterone 200 mg Oral Daily   rosuvastatin 5 mg Oral Daily   sodium chloride 10 mL Intravenous Q12H   thyroid 15 mg Oral QAM AC   Thyroid 60 mg Oral QAM AC   , Continuous Infusions:   , PRN Meds:  •  acetaminophen **OR** acetaminophen **OR** acetaminophen  •  ALPRAZolam  •  bisacodyl  •  calcium carbonate  •  cyclobenzaprine  •  HYDROcodone-acetaminophen  •  HYDROcodone-acetaminophen  •  HYDROmorphone  •  ondansetron **OR** ondansetron  •  [COMPLETED] Insert peripheral IV **AND** sodium chloride  •  Insert peripheral IV **AND** sodium chloride  •  sodium chloride  •  traMADol and Allergies:  Codeine and Erythromycin    Objective      Vital Signs  Temp:  [97 °F (36.1 °C)-98.4 °F (36.9 °C)] 97.3 °F (36.3 °C)  Heart Rate:  [61-85] 77  Resp:  [16] 16  BP:  (110-138)/(72-82) 138/82    Physical Exam   Constitutional: She is oriented to person, place, and time. She appears well-developed and well-nourished.   HENT:   Head: Normocephalic and atraumatic.   Right Ear: External ear normal.   Left Ear: External ear normal.   Eyes: Pupils are equal, round, and reactive to light. Conjunctivae and EOM are normal. Right eye exhibits no discharge. Left eye exhibits no discharge.   Neck: Normal range of motion. Neck supple. No tracheal deviation present.   Pulmonary/Chest: Effort normal. No stridor. No respiratory distress.   Musculoskeletal: Normal range of motion. She exhibits no edema, tenderness or deformity.   Neurological: She is alert and oriented to person, place, and time. She has normal strength and normal reflexes. She displays no atrophy, no tremor and normal reflexes. No cranial nerve deficit or sensory deficit. She exhibits normal muscle tone. She displays a negative Romberg sign. She displays no seizure activity. Coordination and gait normal.   No long tract signs   Skin: Skin is warm and dry.   Psychiatric: She has a normal mood and affect. Her behavior is normal. Judgment and thought content normal.   Nursing note and vitals reviewed.      Results Review:    I reviewed the patient's new clinical results.  I reviewed the patient's new imaging results and agree with the interpretation.        Assessment/Plan       Intractable pain    Hypothyroidism    Acute midline low back pain with right-sided sciatica      Assessment:  (Recurrent back and leg pain  L4-5 disc protrusion with radiculopathy  2 previous L4-5 lami/discectomies, the last of which was only 6wks ago with Dr. Carrasco  ).     Plan:   (As above.  Needs to return to see Dr. Carrasco to discuss additional surgical options. We will S/O. ).       I discussed the patients findings and my recommendations with patient, family and nursing staff    Malina Jeffrey PA-C  12/12/19  2:12 PM    Time:  45min        Electronically signed by Markell Martinez MD at 19 [id number]          Discharge Summary      Ricky Obregon MD at 19 1052                                                                             PHYSICIAN DISCHARGE SUMMARY                                                                        [facility]    Patient Identification:  Name: Katia Puente  Age: 49 y.o.  Sex: female  :  1970  MRN: [id number]  Primary Care Physician: Rene Rivera MD    Admit date: 12/10/2019  Discharge date and time:2019  Discharged Condition: good    Discharge Diagnoses:  Active Hospital Problems    Diagnosis  POA   • **Intractable pain [R52]  Yes   • Acute midline low back pain with right-sided sciatica [M54.41]  Unknown   • Hypothyroidism [E03.9]  Yes      Resolved Hospital Problems   No resolved problems to display.          PMHX:   Past Medical History:   Diagnosis Date   • Allergic    • Breast pain, left    • Elevated cholesterol    • Hemorrhoids 2012   • Hypothyroidism    • Murmur    • Vaginal atrophy      PSHX:   Past Surgical History:   Procedure Laterality Date   • BACK SURGERY      L4, L5 discectomy   • BREAST SURGERY     • CHOLECYSTECTOMY     • ENDOSCOPY  2013   • GALLBLADDER SURGERY     • HEMORRHOIDECTOMY     • HYSTERECTOMY     • LASIK     • TONSILLECTOMY         Hospital Course: Katia Puente  is a 49-year-old white female with history of degenerative disc disease of the spine, allergies, hypothyroidism and history of having recent lumbar spine surgery several weeks ago who presents the ER with worsening back pain and pain with sciatica pain radiating down the right leg with some right groin pain and numbness and extreme difficulty walking. The patient was evaluated in the ER and given some pain medication admitted for further evaluation treatment of her symptoms.        The patient was admitted to hospital and given medication for pain and  some steroids.  Patient was seen by neurosurgery.  He was doing little better after being in the hospital for couple days and felt well enough to go home.  She is going to follow-up with her primary neurosurgeon review the studies and possibly have surgery next week if she has continued symptoms.  She will also follow-up with her primary care provider.    Consults:     Consults     Date and Time Order Name Status Description    12/12/2019 1040 Inpatient Neurosurgery Consult Completed     12/11/2019 0204 [facility] (on-call MD unless specified) Details Completed         Results from last 7 days   Lab Units 12/12/19  0451   WBC 10*3/mm3 6.73   HEMOGLOBIN g/dL 12.9   HEMATOCRIT % 38.4   PLATELETS 10*3/mm3 383     Results from last 7 days   Lab Units 12/12/19  0451   SODIUM mmol/L 138   POTASSIUM mmol/L 4.3   CHLORIDE mmol/L 103   CO2 mmol/L 26.8   BUN mg/dL 10   CREATININE mg/dL 0.64   GLUCOSE mg/dL 102*   CALCIUM mg/dL 8.9     Significant Diagnostic Studies:   WBC   Date Value Ref Range Status   12/12/2019 6.73 3.40 - 10.80 10*3/mm3 Final     Hemoglobin   Date Value Ref Range Status   12/12/2019 12.9 12.0 - 15.9 g/dL Final     Hematocrit   Date Value Ref Range Status   12/12/2019 38.4 34.0 - 46.6 % Final     Platelets   Date Value Ref Range Status   12/12/2019 383 140 - 450 10*3/mm3 Final     Sodium   Date Value Ref Range Status   12/12/2019 138 136 - 145 mmol/L Final     Potassium   Date Value Ref Range Status   12/12/2019 4.3 3.5 - 5.2 mmol/L Final     Chloride   Date Value Ref Range Status   12/12/2019 103 98 - 107 mmol/L Final     CO2   Date Value Ref Range Status   12/12/2019 26.8 22.0 - 29.0 mmol/L Final     BUN   Date Value Ref Range Status   12/12/2019 10 6 - 20 mg/dL Final     Creatinine   Date Value Ref Range Status   12/12/2019 0.64 0.57 - 1.00 mg/dL Final     Glucose   Date Value Ref Range Status   12/12/2019 102 (H) 65 - 99 mg/dL Final     Calcium   Date Value Ref Range Status   12/12/2019 8.9 8.6 - 10.5 mg/dL  Final     No results found for: AST, ALT, ALKPHOS  No results found for: APTT, INR  Color, UA   Date Value Ref Range Status   12/11/2019 Yellow Yellow, Straw Final     Appearance, UA   Date Value Ref Range Status   12/11/2019 Clear Clear Final     pH, UA   Date Value Ref Range Status   12/11/2019 6.0 5.0 - 8.0 Final     Glucose, UA   Date Value Ref Range Status   12/11/2019 Negative Negative Final     Ketones, UA   Date Value Ref Range Status   12/11/2019 Negative Negative Final     Blood, UA   Date Value Ref Range Status   12/11/2019 Negative Negative Final     Leuk Esterase, UA   Date Value Ref Range Status   12/11/2019 Negative Negative Final     Bilirubin, UA   Date Value Ref Range Status   12/11/2019 Negative Negative Final     Urobilinogen, UA   Date Value Ref Range Status   12/11/2019 0.2 E.U./dL 0.2 - 1.0 E.U./dL Final     No results found for: TROPONINT, TROPONINI, BNP  No components found for: HGBA1C;2  No components found for: TSH;2  Imaging Results (All)     Procedure Component Value Units Date/Time    DEXA Bone Density Axial [[id number]] Collected:  12/12/19 1634     Updated:  12/12/19 1647    Narrative:       BONE MINERAL DENSITOMETRY     HISTORY: Perimenopausal. Failed back surgery. Baseline exam.     COMPARISON: None.     TECHNIQUE: The T score compares the patient's bone mineral density with  the peak bone mass of young normal patients. Patients with T-scores  between 1.0 and 2.5 standard deviations below the mean are osteopenic.  Patients with T-scores greater than 2.5 standard deviation below the  mean are osteoporotic.     The Z score compares the patient's bone mineral density with sex and age  matched patients. Z score of -2.0 and lower is an indication of low  mineral density for the patient's age.     FINDINGS:   LUMBAR SPINE:  The BMD measured in the L1 through L4 is 0.843 g/cm2 for  a T-score of -1.9 and a Z-score of -1.2.     LEFT HIP: The BMD for the femoral neck is 0.671g/cm2 for a T  score of   -1.6 and a Z score of -0.9.     RIGHT HIP:  The BMD for the femoral neck is 0.648 g/cm2 for a T score of  -1.8 and a Z score of -1.1.       Impression:       If the patient is considered to be postmenopausal, the bone  density T-scores indicate osteopenia and the 10-year fracture risk for a  major osteoarthritic fracture is 4.2% and for a hip fracture 0.4%.     This report was finalized on 12/12/2019 4:44 PM by Dr. Jelani Anaya M.D.       IR Myelogram Lumbar Spine [[id number]] Collected:  12/12/19 0831     Updated:  12/12/19 0904    Narrative:       PROCEDURE: IR MYELOGRAM LUMBAR SPINE-, CT LUMBAR SPINE W INTRATHECAL  CONTRAST-performed on 12/11/2019.     HISTORY: 49-year-old female with low back pain and right-sided groin  pain and right leg pain. Patient status post laminectomy 6 weeks ago.  Lumbar pain, Rt leg weakness after spine surgery in Oct 2019; R52-Pain,  unspecified; M54.41-Lumbago with sciatica, right side;  M54.10-Radiculopathy, site unspecified     TECHNIQUE: Thin section axial CT with sagittal reconstructions following  intrathecal contrast administration at the L2-3 level via a 22-gauge  spinal needle. 13 cc of Isovue M 200 were injected. Radiographs were  then obtained prior to CT scanning.      FLUOROSCOPY TIME: 1.03 minutes, 18 images.     FINDINGS: No fracture is present. Alignment is abnormal with a  thoracolumbar scoliosis present including concavity to the left and  convex to the to the right centered at approximately L1.      T12-L1: There is mild disc space narrowing present. Conus medullaris  terminates at the T12 level without enlargement. A left paracentral disc  protrusion is seen which flattens the anterior thecal sac, but does not  cause significant spinal stenosis. No facet hypertrophy is appreciated.     L1-L2: Mild left paracentral disc protrusion which flattens the anterior  thecal sac, but does not cause any significant spinal stenosis. No facet  hypertrophy  identified.     L2-L3: No significant disc disease is present. There is no canal  stenosis. A right-sided small nerve root mass is seen which is most  likely a schwannoma. A conjoined root may also give this appearance.     L3-L4: No significant disc disease is present. There is no canal  stenosis. A right-sided small nerve root mass is seen which again is  most likely a schwannoma or conjoined root.     L4-L5: There is disc space narrowing with a broad-based disc herniation  that impinges on the thecal sac and causes some mild spinal stenosis.  Mild bilateral facet arthrosis.      L5-S1: There is disc space narrowing with a vacuum phenomenon present  and a central disc protrusion but no significant impingement on the  thecal sac. Mild bilateral facet arthrosis.       Impression:       Thoracolumbar scoliosis with the some degenerative disc  changes most significant at the L4-5 level. Possible nerve root  schwannomas as described above.      Radiation dose reduction techniques were utilized, including automated  exposure control and exposure modulation based on body size.        This report was finalized on 12/12/2019 9:01 AM by Dr. Benson Pradhan MD.       CT Lumbar Spine With Intrathecal Contrast [[id number]] Collected:  12/12/19 0831     Updated:  12/12/19 0904    Narrative:       PROCEDURE: IR MYELOGRAM LUMBAR SPINE-, CT LUMBAR SPINE W INTRATHECAL  CONTRAST-performed on 12/11/2019.     HISTORY: 49-year-old female with low back pain and right-sided groin  pain and right leg pain. Patient status post laminectomy 6 weeks ago.  Lumbar pain, Rt leg weakness after spine surgery in Oct 2019; R52-Pain,  unspecified; M54.41-Lumbago with sciatica, right side;  M54.10-Radiculopathy, site unspecified     TECHNIQUE: Thin section axial CT with sagittal reconstructions following  intrathecal contrast administration at the L2-3 level via a 22-gauge  spinal needle. 13 cc of Isovue M 200 were injected. Radiographs were  then  obtained prior to CT scanning.      FLUOROSCOPY TIME: 1.03 minutes, 18 images.     FINDINGS: No fracture is present. Alignment is abnormal with a  thoracolumbar scoliosis present including concavity to the left and  convex to the to the right centered at approximately L1.      T12-L1: There is mild disc space narrowing present. Conus medullaris  terminates at the T12 level without enlargement. A left paracentral disc  protrusion is seen which flattens the anterior thecal sac, but does not  cause significant spinal stenosis. No facet hypertrophy is appreciated.     L1-L2: Mild left paracentral disc protrusion which flattens the anterior  thecal sac, but does not cause any significant spinal stenosis. No facet  hypertrophy identified.     L2-L3: No significant disc disease is present. There is no canal  stenosis. A right-sided small nerve root mass is seen which is most  likely a schwannoma. A conjoined root may also give this appearance.     L3-L4: No significant disc disease is present. There is no canal  stenosis. A right-sided small nerve root mass is seen which again is  most likely a schwannoma or conjoined root.     L4-L5: There is disc space narrowing with a broad-based disc herniation  that impinges on the thecal sac and causes some mild spinal stenosis.  Mild bilateral facet arthrosis.      L5-S1: There is disc space narrowing with a vacuum phenomenon present  and a central disc protrusion but no significant impingement on the  thecal sac. Mild bilateral facet arthrosis.       Impression:       Thoracolumbar scoliosis with the some degenerative disc  changes most significant at the L4-5 level. Possible nerve root  schwannomas as described above.      Radiation dose reduction techniques were utilized, including automated  exposure control and exposure modulation based on body size.        This report was finalized on 12/12/2019 9:01 AM by Dr. Benson Pradhan MD.       MRI Lumbar Spine With & Without  Contrast [[id number]] Collected:  12/11/19 1622     Updated:  12/11/19 1626    Narrative:       MRI EXAMINATION OF THE LUMBAR SPINE WITH AND WITHOUT CONTRAST     HISTORY:  Back pain, right radiculopathy. Previous lumbar surgery.     COMPARISON: MRI lumbar spine 05/18/2018.     FINDINGS: There is dextroscoliosis of the lumbar spine with the apex at  the level of L3. There is moderate loss of disc height and disc  desiccation at L5-S1 and to a lesser extent L4-L5 similar in appearance  as compared to the MRI examination of 05/18/2018.     L1-L2: There is a left lateral disc osteophyte complex resulting in mild  lateral recess narrowing similar in appearance as compared to the prior  examination.     L2-3: Mild facet degenerative disease is present bilaterally. There is  no evidence of disc bulge or herniation.     L3-L4: Mild facet degenerative disease is present bilaterally. There is  no evidence of disc bulge or herniation.     L4-L5: A right laminotomy and partial medial facetectomy is noted. There  is a central disc osteophyte complex including a central disc protrusion  present previously. There is signal loss anterior lateral to the thecal  sac to the right in the epidural space T1 sequence. This area enhances  after contrast administration with the exception of a small focus of  nonenhancement suspicious for a small superiorly directed disc  protrusion or herniation (also confirmed on the sagittal T1 postcontrast  sequence).  The superiorly directed disc protrusion/herniation  approaches but does not definitively involve the exiting right L4 nerve.  It is new versus the MRI examination of 05/18/2018. The enhancing tissue  is consistent with granulation tissue and abuts the traversing right L5  nerve root. There is mild lateral recess narrowing on the right. Mild  neuroforaminal compromise is noted on the right secondary to loss of  disc height and extension of a small disc osteophyte complex into the  neural  foramen.     L5-S1: A right laminotomy is noted. Enhancing tissue is identified  adjacent to the traversing S1 nerve root consistent with granulation  tissue. There is no evidence of a disc herniation or disc fragment. Mild  neuroforaminal compromise is present on the right secondary to loss of  disc height and extension of a small disc osteophyte complex into the  neural foramen.       Impression:       The patient is status post right laminotomies and partial  medial facetectomies at L4-L5 and L5-S1. There is enhancing tissue  consistent with granulation tissue at each level anterolateral, lateral  and posteriolateral to the thecal sac to the right abutting the  traversing nerve roots. At L4-L5, a small focus of nonenhancement is  identified which was not present on 05/18/2018 and represents a small  superiorly directed disc protrusion or herniation laterally to the  right. This approaches but does not definitively involve the exiting L4  nerve. There is mild lateral recess narrowing on the right at L4-L5 and  a mild degree of canal stenosis secondary to a central disc osteophyte  complex which is slightly more prominent to the left of midline. Should  a more recent MRI examination of the lumbar spine be made available for  comparison, I would be more than happy to compare the studies and to  generate a supplemental report.     This report was finalized on 12/11/2019 4:23 PM by Dr. Zachary Naik M.D.           Lab Results (last 7 days)     Procedure Component Value Units Date/Time    Basic Metabolic Panel [[id number]]  (Abnormal) Collected:  12/12/19 0451    Specimen:  Blood Updated:  12/12/19 0557     Glucose 102 mg/dL      BUN 10 mg/dL      Creatinine 0.64 mg/dL      Sodium 138 mmol/L      Potassium 4.3 mmol/L      Chloride 103 mmol/L      CO2 26.8 mmol/L      Calcium 8.9 mg/dL      eGFR Non African Amer 99 mL/min/1.73      BUN/Creatinine Ratio 15.6     Anion Gap 8.2 mmol/L     Narrative:       GFR Normal  >60  Chronic Kidney Disease <60  Kidney Failure <15      CBC & Differential [[id number]] Collected:  12/12/19 0451    Specimen:  Blood Updated:  12/12/19 0531    Narrative:       The following orders were created for panel order CBC & Differential.  Procedure                               Abnormality         Status                     ---------                               -----------         ------                     CBC Auto Differential[[id number]]        Abnormal            Final result                 Please view results for these tests on the individual orders.    CBC Auto Differential [[id number]]  (Abnormal) Collected:  12/12/19 0451    Specimen:  Blood Updated:  12/12/19 0531     WBC 6.73 10*3/mm3      RBC 4.05 10*6/mm3      Hemoglobin 12.9 g/dL      Hematocrit 38.4 %      MCV 94.8 fL      MCH 31.9 pg      MCHC 33.6 g/dL      RDW 12.1 %      RDW-SD 42.2 fl      MPV 10.0 fL      Platelets 383 10*3/mm3      Neutrophil % 58.2 %      Lymphocyte % 23.8 %      Monocyte % 11.7 %      Eosinophil % 5.3 %      Basophil % 0.6 %      Immature Grans % 0.4 %      Neutrophils, Absolute 3.91 10*3/mm3      Lymphocytes, Absolute 1.60 10*3/mm3      Monocytes, Absolute 0.79 10*3/mm3      Eosinophils, Absolute 0.36 10*3/mm3      Basophils, Absolute 0.04 10*3/mm3      Immature Grans, Absolute 0.03 10*3/mm3      nRBC 0.0 /100 WBC     Body Fluid Cell Count With Differential - Cerebrospinal Fluid, Spine, Lumbar [[id number]] Collected:  12/11/19 1614    Specimen:  Cerebrospinal Fluid from Spine, Lumbar Updated:  12/11/19 1823    Narrative:       The following orders were created for panel order Body Fluid Cell Count With Differential - Cerebrospinal Fluid, Spine, Lumbar.  Procedure                               Abnormality         Status                     ---------                               -----------         ------                     Body fluid cell count - ...[[id number]]                      Final result                  Please view results for these tests on the individual orders.    Body fluid cell count - Cerebrospinal Fluid, Spine, Lumbar [[id number]] Collected:  12/11/19 1614    Specimen:  Cerebrospinal Fluid from Spine, Lumbar Updated:  12/11/19 1823     Color, Fluid Colorless     Comment: Tube #1        Appearance, Fluid Clear     WBC, Fluid 0 /mm3      RBC, Fluid 4 /mm3     Narrative:       Differential not indicated.    Basic Metabolic Panel [[id number]]  (Normal) Collected:  12/11/19 0618    Specimen:  Blood Updated:  12/11/19 0704     Glucose 94 mg/dL      BUN 11 mg/dL      Creatinine 0.65 mg/dL      Sodium 140 mmol/L      Potassium 4.0 mmol/L      Chloride 104 mmol/L      CO2 25.6 mmol/L      Calcium 8.9 mg/dL      eGFR Non African Amer 97 mL/min/1.73      BUN/Creatinine Ratio 16.9     Anion Gap 10.4 mmol/L     Narrative:       GFR Normal >60  Chronic Kidney Disease <60  Kidney Failure <15      CBC (No Diff) [[id number]]  (Normal) Collected:  12/11/19 0618    Specimen:  Blood Updated:  12/11/19 0633     WBC 7.03 10*3/mm3      RBC 4.22 10*6/mm3      Hemoglobin 13.1 g/dL      Hematocrit 39.3 %      MCV 93.1 fL      MCH 31.0 pg      MCHC 33.3 g/dL      RDW 12.5 %      RDW-SD 42.4 fl      MPV 9.6 fL      Platelets 420 10*3/mm3     Urinalysis With Microscopic If Indicated (No Culture) - Urine, Clean Catch [[id number]]  (Normal) Collected:  12/11/19 0408    Specimen:  Urine, Clean Catch Updated:  12/11/19 0429     Color, UA Yellow     Appearance, UA Clear     pH, UA 6.0     Specific Gravity, UA 1.013     Glucose, UA Negative     Ketones, UA Negative     Bilirubin, UA Negative     Blood, UA Negative     Protein, UA Negative     Leuk Esterase, UA Negative     Nitrite, UA Negative     Urobilinogen, UA 0.2 E.U./dL    Narrative:       Urine microscopic not indicated.    CBC & Differential [[id number]] Collected:  12/10/19 9344    Specimen:  Blood Updated:  12/11/19 0241    Narrative:       The following orders were created for panel  order CBC & Differential.  Procedure                               Abnormality         Status                     ---------                               -----------         ------                     CBC Auto Differential[[id number]]        Abnormal            Final result                 Please view results for these tests on the individual orders.    CBC Auto Differential [[id number]]  (Abnormal) Collected:  12/10/19 2347    Specimen:  Blood Updated:  12/11/19 0241     WBC 7.22 10*3/mm3      RBC 4.54 10*6/mm3      Hemoglobin 14.2 g/dL      Hematocrit 42.5 %      MCV 93.6 fL      MCH 31.3 pg      MCHC 33.4 g/dL      RDW 12.3 %      RDW-SD 42.3 fl      MPV 10.7 fL      Platelets 490 10*3/mm3      Neutrophil % 53.0 %      Lymphocyte % 29.8 %      Monocyte % 10.9 %      Eosinophil % 5.3 %      Basophil % 0.7 %      Immature Grans % 0.3 %      Neutrophils, Absolute 3.83 10*3/mm3      Lymphocytes, Absolute 2.15 10*3/mm3      Monocytes, Absolute 0.79 10*3/mm3      Eosinophils, Absolute 0.38 10*3/mm3      Basophils, Absolute 0.05 10*3/mm3      Immature Grans, Absolute 0.02 10*3/mm3      nRBC 0.0 /100 WBC     Basic Metabolic Panel [[id number]]  (Normal) Collected:  12/10/19 2347    Specimen:  Blood Updated:  12/11/19 0229     Glucose 77 mg/dL      BUN 14 mg/dL      Creatinine 0.60 mg/dL      Sodium 140 mmol/L      Potassium 4.2 mmol/L      Chloride 103 mmol/L      CO2 27.4 mmol/L      Calcium 9.2 mg/dL      eGFR Non African Amer 106 mL/min/1.73      BUN/Creatinine Ratio 23.3     Anion Gap 9.6 mmol/L     Narrative:       GFR Normal >60  Chronic Kidney Disease <60  Kidney Failure <15      hCG, Serum, Qualitative [[id number]]  (Normal) Collected:  12/10/19 2347    Specimen:  Blood Updated:  12/11/19 0224     HCG Qualitative Negative    [place] Draw [[id number]] Collected:  12/10/19 2347    Specimen:  Blood Updated:  12/11/19 0100    Narrative:       The following orders were created for panel order [place] Draw.  Procedure         "                       Abnormality         Status                     ---------                               -----------         ------                     Light Blue Top[[id number]]                                   Final result               Green Top (Gel)[[id number]]                                  Final result               Lavender Top[[id number]]                                     Final result               Gold Top - SST[[id number]]                                   Final result                 Please view results for these tests on the individual orders.    Green Top (Gel) [[id number]] Collected:  12/10/19 2347    Specimen:  Blood Updated:  12/11/19 0100     Extra Tube Hold for add-ons.     Comment: Auto resulted.       Gold Top - SST [[id number]] Collected:  12/10/19 2347    Specimen:  Blood Updated:  12/11/19 0100     Extra Tube Hold for add-ons.     Comment: Auto resulted.       Light Blue Top [[id number]] Collected:  12/10/19 2347    Specimen:  Blood Updated:  12/11/19 0100     Extra Tube hold for add-on     Comment: Auto resulted       Lavender Top [[id number]] Collected:  12/10/19 2347    Specimen:  Blood Updated:  12/11/19 0100     Extra Tube hold for add-on     Comment: Auto resulted           /73 (BP Location: Right arm, Patient Position: Lying)   Pulse 84   Temp 97 °F (36.1 °C) (Oral)   Resp 16   Ht 154.9 cm (60.98\")   Wt 54.1 kg (119 lb 3.2 oz)   SpO2 100%   Breastfeeding No   BMI 22.50 kg/m²      Discharge Exam:  General Appearance:    Alert, cooperative, no distress                          Head:    Normocephalic, without obvious abnormality, atraumatic                          Eyes:                            Throat:   Lips, tongue, gums normal                          Neck:   Supple, symmetrical, trachea midline, no JVD                        Lungs:     Clear to auscultation bilaterally, respirations unlabored                Chest Wall:    No tenderness or deformity                   "      Heart:    Regular rate and rhythm, S1 and S2 normal, no murmur,no  Rub or gallop                  Abdomen:     Soft, non-tender, bowel sounds active, no masses, no organomegaly                  Extremities:   Extremities normal, atraumatic, no cyanosis or edema                             Skin:   Skin is warm and dry,  no rashes or palpable lesions                  Neurologic: Right leg weakness     Disposition:  Home    Patient Instructions:      Discharge Medications      Changes to Medications      Instructions Start Date   HYDROcodone-acetaminophen 7.5-325 MG per tablet  Commonly known as:  NORCO  What changed:    · when to take this  · reasons to take this   1 tablet, Oral, Every 4 Hours PRN         Continue These Medications      Instructions Start Date   ALPRAZolam 0.5 MG tablet  Commonly known as:  XANAX   No dose, route, or frequency recorded.      ARMOUR THYROID 15 MG tablet  Generic drug:  thyroid   No dose, route, or frequency recorded.      ARMOUR THYROID 60 MG tablet  Generic drug:  Thyroid   60 mg      celecoxib 200 MG capsule  Commonly known as:  CELEBREX   200 mg, Oral, Daily      cetirizine 5 MG tablet  Commonly known as:  zyrTEC   5 mg, Oral      cyclobenzaprine 10 MG tablet  Commonly known as:  FLEXERIL   TAKE 1 TABLET BY MOUTH 3 (THREE) TIMES DAILY AS NEEDED FOR MUSCLE SPASMS FOR UP TO 14 DAYS.      FISH OIL + D3 PO   Oral      GRALISE 300 MG tablet  Generic drug:  Gabapentin (Once-Daily)   TAKE 1 TABLET BY MOUTH AT BEDTIME FOR 7 DAYS, THEN TAKE 2 TABLETS...  (REFER TO PRESCRIPTION NOTES).      IRON PO   Oral      metFORMIN  MG 24 hr tablet  Commonly known as:  GLUCOPHAGE-XR   500 mg, Oral, Every Morning      multivitamin with fluoride/iron 0.25-10 MG/ML solution solution   Oral, Daily      progesterone 200 MG capsule  Commonly known as:  PROMETRIUM   200 mg, Oral, Daily      rosuvastatin 5 MG tablet  Commonly known as:  CRESTOR   No dose, route, or frequency recorded.      traMADol  50 MG tablet  Commonly known as:  ULTRAM   50 mg, Oral, Every 6 Hours PRN      VYVANSE 50 MG capsule  Generic drug:  lisdexamfetamine   50 mg, Oral, Daily         Stop These Medications    amoxicillin-clavulanate 875-125 MG per tablet  Commonly known as:  AUGMENTIN     cephalexin 500 MG capsule  Commonly known as:  KEFLEX     clotrimazole-betamethasone 1-0.05 % cream  Commonly known as:  LOTRISONE     dexamethasone 4 MG tablet  Commonly known as:  DECADRON     nystatin 132705 UNIT/GM ointment  Commonly known as:  MYCOSTATIN          No future appointments.  Follow-up Information     Serafin Carrasco MD Follow up.    Specialty:  Orthopedic Surgery  Contact information:  [address] [id number] [phone]             Rene Rivera MD Follow up.    Specialty:  Family Medicine  Contact information:  [address] [address] [id number] [phone]                 Discharge Order (From admission, onward)     Start     Ordered    12/13/19 1047  Discharge patient  Once     Expected Discharge Date:  12/13/19    Discharge Disposition:  Home or Self Care    Physician of Record for Attribution - Please select from Treatment Team:  RICKY OBREGON [[id number]]    Review needed by CMO to determine Physician of Record:  No       Question Answer Comment   Physician of Record for Attribution - Please select from Treatment Team RICKY BOREGON    Review needed by CMO to determine Physician of Record No        12/13/19 1049                Total time spent discharging patient including evaluation,post hospitalization follow up,  medication and post hospitalization instructions and education total time exceeds 30 minutes.    Signed:  Ricky Obregon MD  12/13/2019  10:50 AM      Electronically signed by Ricky Obregon MD at 12/13/19 4392

## 2019-12-16 NOTE — OUTREACH NOTE
Spoke with pt. She is not improved in her back pain, she is however, now sched for spine sx with Dr Carrasco on 12/19/19. She is very relieved to be getting this done. She does decline to sched TCM HOSP fwp due to upcoming sx.

## 2019-12-18 ENCOUNTER — TRANSCRIBE ORDERS (OUTPATIENT)
Dept: ADMINISTRATIVE | Facility: HOSPITAL | Age: 49
End: 2019-12-18

## 2019-12-18 ENCOUNTER — LAB (OUTPATIENT)
Dept: LAB | Facility: HOSPITAL | Age: 49
End: 2019-12-18

## 2019-12-18 DIAGNOSIS — Z01.818 PRE-OP TESTING: ICD-10-CM

## 2019-12-18 DIAGNOSIS — Z01.818 PRE-OP TESTING: Primary | ICD-10-CM

## 2019-12-18 LAB
APTT PPP: 24.3 SECONDS (ref 24–31)
INR PPP: 1.07 (ref 0.9–1.1)
PROTHROMBIN TIME: 11.1 SECONDS (ref 9.6–11.7)

## 2019-12-18 PROCEDURE — 85730 THROMBOPLASTIN TIME PARTIAL: CPT

## 2019-12-18 PROCEDURE — 85610 PROTHROMBIN TIME: CPT

## 2019-12-18 PROCEDURE — 36415 COLL VENOUS BLD VENIPUNCTURE: CPT

## 2019-12-19 PROCEDURE — 88304 TISSUE EXAM BY PATHOLOGIST: CPT | Performed by: ORTHOPAEDIC SURGERY

## 2019-12-20 ENCOUNTER — LAB REQUISITION (OUTPATIENT)
Dept: LAB | Facility: HOSPITAL | Age: 49
End: 2019-12-20

## 2019-12-20 DIAGNOSIS — M51.26 OTHER INTERVERTEBRAL DISC DISPLACEMENT, LUMBAR REGION: ICD-10-CM

## 2019-12-23 LAB
LAB AP CASE REPORT: NORMAL
PATH REPORT.FINAL DX SPEC: NORMAL
PATH REPORT.GROSS SPEC: NORMAL

## 2020-08-06 ENCOUNTER — OFFICE VISIT (OUTPATIENT)
Dept: OBSTETRICS AND GYNECOLOGY | Age: 50
End: 2020-08-06

## 2020-08-06 VITALS
HEIGHT: 61 IN | WEIGHT: 117 LBS | BODY MASS INDEX: 22.09 KG/M2 | DIASTOLIC BLOOD PRESSURE: 70 MMHG | SYSTOLIC BLOOD PRESSURE: 122 MMHG

## 2020-08-06 DIAGNOSIS — N76.0 VAGINAL INFECTION: Primary | ICD-10-CM

## 2020-08-06 PROCEDURE — 99213 OFFICE O/P EST LOW 20 MIN: CPT | Performed by: PHYSICIAN ASSISTANT

## 2020-08-06 RX ORDER — CLOTRIMAZOLE AND BETAMETHASONE DIPROPIONATE 10; .64 MG/G; MG/G
CREAM TOPICAL EVERY 12 HOURS SCHEDULED
Qty: 15 G | Refills: 0 | Status: SHIPPED | OUTPATIENT
Start: 2020-08-06 | End: 2020-12-03 | Stop reason: HOSPADM

## 2020-08-06 NOTE — PROGRESS NOTES
"Subjective     Chief Complaint   Patient presents with   • Gynecologic Exam     c/o vaginal discharge and irritation, had yeast infection recently took diflucan just still doesn't seem back to normal       Katia Puente is a 50 y.o.  whose LMP is No LMP recorded. Patient has had a hysterectomy. presents with possible yeast infection    Noted some burning and irritation  Thought it was yeast  Could see it affecting her vaginal tissue  H/o inflammation and seen by Dr Kaur for this last year  Thought it could be lichen as well    She did use a tube of nystatin for 3-4 days  Seems better but not quite resolved      h/o back surgery last winter  Doing much better now    Pt of Dr Kaur  New to me with this concern    No Additional Complaints Reported    The following portions of the patient's history were reviewed and updated as appropriate:vital signs, allergies, current medications, past family history, past medical history, past social history, past surgical history and problem list      Review of Systems   Genitourinary:positive for vaginal discharge     Objective      /70   Ht 154.9 cm (61\")   Wt 53.1 kg (117 lb)   Breastfeeding No   BMI 22.11 kg/m²     Physical Exam    General:   alert, comfortable and no distress   Heart: Not performed today   Lungs: Not performed today.   Breast: Not performed today   Neck: na   Abdomen: {Not performed today   CVA: Not performed today   Pelvis: External genitalia: normal general appearance  Vaginal: normal mucosa without prolapse or lesions, atrophic mucosa and discharge, white and scant, culture obtained  Cervix: absent   Extremities: Not performed today   Neurologic: negative   Psychiatric: Normal affect, judgement, and mood       Lab Review   Labs: No data reviewed     Imaging   No data reviewed    Assessment/Plan     ASSESSMENT  1. Vaginal infection        PLAN  1.   Orders Placed This Encounter   Procedures   • NuSwab BV & Candida - , Cervix       2. " Medications prescribed this encounter:        New Medications Ordered This Visit   Medications   • clotrimazole-betamethasone (Lotrisone) 1-0.05 % cream     Sig: Apply  topically to the appropriate area as directed Every 12 (Twelve) Hours.     Dispense:  15 g     Refill:  0       3. Possibly resolving yeast infection. Could also be atrophic vaginitis vs lichen. Will try topical meds first. Send culture to r/o occult infection. If sx's don't improve, could consider estrogen pv.      Follow up: 3 month(s) or so for annual and mammogram    MARCOS Yancey  8/6/2020

## 2020-08-11 LAB
A VAGINAE DNA VAG QL NAA+PROBE: NORMAL SCORE
BVAB2 DNA VAG QL NAA+PROBE: NORMAL SCORE
C ALBICANS DNA VAG QL NAA+PROBE: NEGATIVE
C GLABRATA DNA VAG QL NAA+PROBE: NEGATIVE
MEGA1 DNA VAG QL NAA+PROBE: NORMAL SCORE

## 2020-09-24 ENCOUNTER — OFFICE VISIT (OUTPATIENT)
Dept: OBSTETRICS AND GYNECOLOGY | Age: 50
End: 2020-09-24

## 2020-09-24 VITALS
BODY MASS INDEX: 22.66 KG/M2 | WEIGHT: 120 LBS | HEIGHT: 61 IN | SYSTOLIC BLOOD PRESSURE: 118 MMHG | DIASTOLIC BLOOD PRESSURE: 70 MMHG

## 2020-09-24 DIAGNOSIS — N81.6 RECTOCELE: Primary | ICD-10-CM

## 2020-09-24 LAB
DEVELOPER EXPIRATION DATE: NORMAL
DEVELOPER LOT NUMBER: NORMAL
EXPIRATION DATE: NORMAL
FECAL OCCULT BLOOD SCREEN, POC: NEGATIVE
Lab: 1591
NEGATIVE CONTROL: NEGATIVE
POSITIVE CONTROL: POSITIVE

## 2020-09-24 PROCEDURE — 82274 ASSAY TEST FOR BLOOD FECAL: CPT | Performed by: PHYSICIAN ASSISTANT

## 2020-09-24 PROCEDURE — 99213 OFFICE O/P EST LOW 20 MIN: CPT | Performed by: PHYSICIAN ASSISTANT

## 2020-09-24 NOTE — PROGRESS NOTES
"Subjective     Chief Complaint   Patient presents with   • Gynecologic Exam     c/o possible rectal prolaspe, has had previous bladder surgery.        Katia Puente is a 50 y.o.  whose LMP is No LMP recorded. Patient has had a hysterectomy. presents with possible rectocoele    She had back surgery in Dec  Is doing better every day    Has h/o rectocoele and had surgery to repair it  Thinks it is back  Has to insert a finger to actually have a BM  Just recently started with sx's  Does think all this started when she would get constipated     Pt of Dr Kaur  New to me with this concern  No Additional Complaints Reported    The following portions of the patient's history were reviewed and updated as appropriate:vital signs, allergies, current medications, past family history, past medical history, past social history, past surgical history and problem list      Review of Systems   Genitourinary:positive for rectum      Objective      /70   Ht 154.9 cm (61\")   Wt 54.4 kg (120 lb)   Breastfeeding No   BMI 22.67 kg/m²     Physical Exam    General:   alert, comfortable and no distress   Heart: Not performed today   Lungs: Not performed today.   Breast: Not performed today   Neck: no adenopathy and no carotid bruit   Abdomen: {Not performed today   CVA: Not performed today   Pelvis: External genitalia: normal general appearance  Vaginal: normal without tenderness, induration or masses, normal rugae and rectocele present, mild-moderate  Cervix: absent  Rectal: no masses, guaiac negative, rectocoele noted, mild-moderate   Extremities: Not performed today   Neurologic: negative   Psychiatric: Normal affect, judgement, and mood       Lab Review   Labs: No data reviewed     Imaging   No data reviewed    Assessment/Plan     ASSESSMENT  1. Rectocele        PLAN  1.   Orders Placed This Encounter   Procedures   • Ambulatory Referral to Physical Therapy Evaluate and treat   • POC Occult Blood Stool       2. Pt " would like to try PT but also wants to schedule f/u with Dr Kaur to discuss possible surgery. I gave her a HO and counseled her about keeping stools soft and pushing fluids as well    Follow up: 4 week(s) or so for pre op discussion    MARCOS Yancey  9/24/2020

## 2020-10-26 ENCOUNTER — PREP FOR SURGERY (OUTPATIENT)
Dept: OTHER | Facility: HOSPITAL | Age: 50
End: 2020-10-26

## 2020-10-26 ENCOUNTER — OFFICE VISIT (OUTPATIENT)
Dept: FAMILY MEDICINE CLINIC | Facility: CLINIC | Age: 50
End: 2020-10-26

## 2020-10-26 ENCOUNTER — OFFICE VISIT (OUTPATIENT)
Dept: OBSTETRICS AND GYNECOLOGY | Age: 50
End: 2020-10-26

## 2020-10-26 VITALS
DIASTOLIC BLOOD PRESSURE: 72 MMHG | BODY MASS INDEX: 22.84 KG/M2 | SYSTOLIC BLOOD PRESSURE: 120 MMHG | WEIGHT: 121 LBS | HEIGHT: 61 IN

## 2020-10-26 VITALS
RESPIRATION RATE: 16 BRPM | OXYGEN SATURATION: 98 % | HEART RATE: 85 BPM | HEIGHT: 61 IN | WEIGHT: 124 LBS | TEMPERATURE: 97.1 F | SYSTOLIC BLOOD PRESSURE: 143 MMHG | DIASTOLIC BLOOD PRESSURE: 88 MMHG | BODY MASS INDEX: 23.41 KG/M2

## 2020-10-26 DIAGNOSIS — R03.0 ELEVATED BLOOD PRESSURE READING: Primary | ICD-10-CM

## 2020-10-26 DIAGNOSIS — N81.6 RECTOCELE: Primary | ICD-10-CM

## 2020-10-26 PROBLEM — E03.9 HYPOTHYROIDISM: Status: ACTIVE | Noted: 2019-08-11

## 2020-10-26 PROCEDURE — 99213 OFFICE O/P EST LOW 20 MIN: CPT | Performed by: FAMILY MEDICINE

## 2020-10-26 PROCEDURE — 99213 OFFICE O/P EST LOW 20 MIN: CPT | Performed by: OBSTETRICS & GYNECOLOGY

## 2020-10-26 RX ORDER — SODIUM CHLORIDE 0.9 % (FLUSH) 0.9 %
10 SYRINGE (ML) INJECTION AS NEEDED
Status: CANCELLED | OUTPATIENT
Start: 2020-12-02

## 2020-10-26 RX ORDER — SODIUM CHLORIDE 0.9 % (FLUSH) 0.9 %
3 SYRINGE (ML) INJECTION EVERY 12 HOURS SCHEDULED
Status: CANCELLED | OUTPATIENT
Start: 2020-12-02

## 2020-10-26 NOTE — PROGRESS NOTES
"Subjective   Katia Puente is a 50 y.o. female.     CC: Elevated BPs    History of Present Illness     Pt comes in today reporting some elevating BPs more over the past few weeks. Was 120's/70's this AM at her GYN. Pt reports \"retaining fluids\" more recently. Has had some HAs and flushing at times, too. Pt has been under a lot of family stress issues over these past months yet reports dealing with that well. Pt hasn't had issues with her BPs prior.       The following portions of the patient's history were reviewed and updated as appropriate: allergies, current medications, past family history, past medical history, past social history, past surgical history and problem list.    Review of Systems   Constitutional: Negative for activity change, chills and fever.   Respiratory: Negative for cough.    Cardiovascular: Negative for chest pain.   Psychiatric/Behavioral: Negative for dysphoric mood.       Objective   Physical Exam  Constitutional:       General: She is not in acute distress.     Appearance: She is well-developed.   Cardiovascular:      Rate and Rhythm: Normal rate and regular rhythm.   Pulmonary:      Effort: Pulmonary effort is normal.      Breath sounds: Normal breath sounds.   Neurological:      Mental Status: She is alert and oriented to person, place, and time.   Psychiatric:         Behavior: Behavior normal.         Thought Content: Thought content normal.         Assessment/Plan   Diagnoses and all orders for this visit:    1. Elevated blood pressure reading (Primary)    Pt to start logging BPs, increase exercise, watch salt and increase water. Will recheck back (with her machine) in 2 months.           "

## 2020-10-26 NOTE — PROGRESS NOTES
Subjective     Chief Complaint   Patient presents with   • Gynecologic Exam     Gyn problem: recurrent rectal prolapse       History of Present Illness  Ktaia Puente is a 50 y.o. female is being seen today for evaluation of rectocele.  Patient saw nurse practitioner previously, symptoms of rectocele have worsened over the last 6 months.  She has pressure discomfort and sometimes has to insert a finger to start a bowel movement.  Chief Complaint   Patient presents with   • Gynecologic Exam     Gyn problem: recurrent rectal prolapse   .        The following portions of the patient's history were reviewed and updated as appropriate: allergies, current medications, past family history, past medical history, past social history, past surgical history and problem list.    PAST MEDICAL HISTORY  Past Medical History:   Diagnosis Date   • Allergic    • Breast pain, left    • Elevated cholesterol    • Hemorrhoids 2012   • Hypothyroidism    • Murmur    • Rectocele 10/26/2020   • Vaginal atrophy      OB History    Para Term  AB Living   5 2 2   3     SAB TAB Ectopic Molar Multiple Live Births   3                # Outcome Date GA Lbr Richie/2nd Weight Sex Delivery Anes PTL Lv   5 SAB            4 SAB            3 SAB            2 Term            1 Term              Past Surgical History:   Procedure Laterality Date   • BACK SURGERY      L4, L5 discectomy   • BREAST SURGERY     • CHOLECYSTECTOMY     • ENDOSCOPY  2013   • GALLBLADDER SURGERY  2016   • HEMORRHOIDECTOMY     • HYSTERECTOMY     • LASIK     • TONSILLECTOMY       Family History   Problem Relation Age of Onset   • Heart disease Mother    • Heart disease Father    • Diabetes Paternal Aunt    • Stroke Maternal Grandmother    • Cancer Paternal Grandfather      Social History     Tobacco Use   Smoking Status Never Smoker   Smokeless Tobacco Never Used       Current Outpatient Medications:   •  ALPRAZolam (XANAX) 0.5 MG tablet, , Disp: , Rfl:    •  ARMOUR THYROID 15 MG tablet, , Disp: , Rfl:   •  ARMOUR THYROID 60 MG PO tablet, 60 mg., Disp: , Rfl:   •  cetirizine (zyrTEC) 5 MG tablet, Take 5 mg by mouth., Disp: , Rfl:   •  clotrimazole-betamethasone (Lotrisone) 1-0.05 % cream, Apply  topically to the appropriate area as directed Every 12 (Twelve) Hours., Disp: 15 g, Rfl: 0  •  Fish Oil-Cholecalciferol (FISH OIL + D3 PO), Take  by mouth., Disp: , Rfl:   •  IRON PO, Take  by mouth., Disp: , Rfl:   •  Ped Multivitamins-Fl-Iron (MULTIVITAMIN WITH FLUORIDE/IRON) 0.25-10 MG/ML solution solution, Take  by mouth daily., Disp: , Rfl:   •  rosuvastatin (CRESTOR) 5 MG tablet, , Disp: , Rfl:   •  VYVANSE 50 MG capsule, Take 50 mg by mouth Daily, Disp: , Rfl: 0    There is no immunization history on file for this patient.    Review of Systems       Except as outlined in history of physical illness, patient denies any changes in her GYN, , GI systems. All other systems reviewed are negative.    Objective   Physical Exam   Alert and oriented, respirations unlabored, heart regular rate and rhythm   Pelvic normal female vagina is clean dry intact cervix uterus are absent anterior vaginal wall is normal posterior wall reveals a second-degree rectocele.  Muscle tone is normal.      Assessment/Plan   Diagnoses and all orders for this visit:    1. Rectocele (Primary)    Worsening, symptomatic, patient is requesting repair.  I drew diagrams illustrated what her rectocele look like.  Talked about risk of bleeding infection potential injury to other organs long-term success rate and failure rates.  Patient would like to proceed.  She has previously had a TVH and sling with [facility], had bleeding complications afterwards.                     EMR Dragon/ Transcription disclaimer:  Much of the encounter note is an electronic transcription/translation of spoken language to printed text. The electronic translation of spoken language may permit erroneous, or at times,  nonessential words or phrases to be inadvertently transcribes; Although i have reviewed the note for such errors, some may still exist.

## 2020-11-30 ENCOUNTER — APPOINTMENT (OUTPATIENT)
Dept: PREADMISSION TESTING | Facility: HOSPITAL | Age: 50
End: 2020-11-30

## 2020-11-30 VITALS
HEIGHT: 62 IN | BODY MASS INDEX: 23.22 KG/M2 | DIASTOLIC BLOOD PRESSURE: 86 MMHG | TEMPERATURE: 97.9 F | WEIGHT: 126.2 LBS | SYSTOLIC BLOOD PRESSURE: 148 MMHG | OXYGEN SATURATION: 100 % | HEART RATE: 78 BPM | RESPIRATION RATE: 16 BRPM

## 2020-11-30 LAB
ANION GAP SERPL CALCULATED.3IONS-SCNC: 8.6 MMOL/L (ref 5–15)
BUN SERPL-MCNC: 9 MG/DL (ref 6–20)
BUN/CREAT SERPL: 13 (ref 7–25)
CALCIUM SPEC-SCNC: 9 MG/DL (ref 8.6–10.5)
CHLORIDE SERPL-SCNC: 104 MMOL/L (ref 98–107)
CO2 SERPL-SCNC: 28.4 MMOL/L (ref 22–29)
CREAT SERPL-MCNC: 0.69 MG/DL (ref 0.57–1)
DEPRECATED RDW RBC AUTO: 43.6 FL (ref 37–54)
ERYTHROCYTE [DISTWIDTH] IN BLOOD BY AUTOMATED COUNT: 12.7 % (ref 12.3–15.4)
GFR SERPL CREATININE-BSD FRML MDRD: 90 ML/MIN/1.73
GLUCOSE SERPL-MCNC: 95 MG/DL (ref 65–99)
HCT VFR BLD AUTO: 38.6 % (ref 34–46.6)
HGB BLD-MCNC: 13 G/DL (ref 12–15.9)
MCH RBC QN AUTO: 31.7 PG (ref 26.6–33)
MCHC RBC AUTO-ENTMCNC: 33.7 G/DL (ref 31.5–35.7)
MCV RBC AUTO: 94.1 FL (ref 79–97)
PLATELET # BLD AUTO: 381 10*3/MM3 (ref 140–450)
PMV BLD AUTO: 9.6 FL (ref 6–12)
POTASSIUM SERPL-SCNC: 3.5 MMOL/L (ref 3.5–5.2)
RBC # BLD AUTO: 4.1 10*6/MM3 (ref 3.77–5.28)
SODIUM SERPL-SCNC: 141 MMOL/L (ref 136–145)
WBC # BLD AUTO: 7.11 10*3/MM3 (ref 3.4–10.8)

## 2020-11-30 PROCEDURE — C9803 HOPD COVID-19 SPEC COLLECT: HCPCS

## 2020-11-30 PROCEDURE — 85027 COMPLETE CBC AUTOMATED: CPT

## 2020-11-30 PROCEDURE — 80048 BASIC METABOLIC PNL TOTAL CA: CPT

## 2020-11-30 PROCEDURE — U0004 COV-19 TEST NON-CDC HGH THRU: HCPCS

## 2020-11-30 PROCEDURE — 36415 COLL VENOUS BLD VENIPUNCTURE: CPT

## 2020-11-30 RX ORDER — VITAMIN E 268 MG
400 CAPSULE ORAL DAILY
COMMUNITY

## 2020-11-30 RX ORDER — MULTIPLE VITAMINS W/ MINERALS TAB 9MG-400MCG
1 TAB ORAL DAILY
COMMUNITY

## 2020-12-01 ENCOUNTER — TELEPHONE (OUTPATIENT)
Dept: OBSTETRICS AND GYNECOLOGY | Age: 50
End: 2020-12-01

## 2020-12-01 LAB — SARS-COV-2 RNA RESP QL NAA+PROBE: NOT DETECTED

## 2020-12-01 NOTE — TELEPHONE ENCOUNTER
----- Message from Rene Kaur MD sent at 12/1/2020  8:52 AM EST -----  Please notify pt. That labs are with in normal limits

## 2020-12-02 ENCOUNTER — HOSPITAL ENCOUNTER (OUTPATIENT)
Facility: HOSPITAL | Age: 50
Discharge: HOME | End: 2020-12-03
Attending: OBSTETRICS & GYNECOLOGY | Admitting: OBSTETRICS & GYNECOLOGY

## 2020-12-02 ENCOUNTER — ANESTHESIA EVENT (OUTPATIENT)
Dept: PERIOP | Facility: HOSPITAL | Age: 50
End: 2020-12-02

## 2020-12-02 ENCOUNTER — ANESTHESIA (OUTPATIENT)
Dept: PERIOP | Facility: HOSPITAL | Age: 50
End: 2020-12-02

## 2020-12-02 DIAGNOSIS — N81.6 RECTOCELE: ICD-10-CM

## 2020-12-02 PROCEDURE — 25010000002 FENTANYL CITRATE (PF) 100 MCG/2ML SOLUTION: Performed by: NURSE ANESTHETIST, CERTIFIED REGISTERED

## 2020-12-02 PROCEDURE — G0378 HOSPITAL OBSERVATION PER HR: HCPCS

## 2020-12-02 PROCEDURE — 25010000002 HYDROMORPHONE PER 4 MG: Performed by: NURSE ANESTHETIST, CERTIFIED REGISTERED

## 2020-12-02 PROCEDURE — 25010000002 ONDANSETRON PER 1 MG: Performed by: NURSE ANESTHETIST, CERTIFIED REGISTERED

## 2020-12-02 PROCEDURE — 25010000003 CEFAZOLIN IN DEXTROSE 2-4 GM/100ML-% SOLUTION: Performed by: NURSE ANESTHETIST, CERTIFIED REGISTERED

## 2020-12-02 PROCEDURE — 57250 REPAIR RECTUM & VAGINA: CPT | Performed by: OBSTETRICS & GYNECOLOGY

## 2020-12-02 PROCEDURE — 25010000002 PROPOFOL 10 MG/ML EMULSION: Performed by: NURSE ANESTHETIST, CERTIFIED REGISTERED

## 2020-12-02 PROCEDURE — 25010000002 MIDAZOLAM PER 1 MG: Performed by: ANESTHESIOLOGY

## 2020-12-02 PROCEDURE — 25010000002 DEXAMETHASONE PER 1 MG: Performed by: NURSE ANESTHETIST, CERTIFIED REGISTERED

## 2020-12-02 PROCEDURE — S0260 H&P FOR SURGERY: HCPCS | Performed by: OBSTETRICS & GYNECOLOGY

## 2020-12-02 PROCEDURE — 88302 TISSUE EXAM BY PATHOLOGIST: CPT | Performed by: OBSTETRICS & GYNECOLOGY

## 2020-12-02 PROCEDURE — 63710000001 PROMETHAZINE PER 25 MG: Performed by: NURSE ANESTHETIST, CERTIFIED REGISTERED

## 2020-12-02 RX ORDER — DOCUSATE SODIUM 100 MG/1
100 CAPSULE, LIQUID FILLED ORAL 2 TIMES DAILY PRN
Status: DISCONTINUED | OUTPATIENT
Start: 2020-12-02 | End: 2020-12-03 | Stop reason: HOSPADM

## 2020-12-02 RX ORDER — FENTANYL CITRATE 50 UG/ML
INJECTION, SOLUTION INTRAMUSCULAR; INTRAVENOUS AS NEEDED
Status: DISCONTINUED | OUTPATIENT
Start: 2020-12-02 | End: 2020-12-02 | Stop reason: SURG

## 2020-12-02 RX ORDER — SODIUM CHLORIDE, SODIUM LACTATE, POTASSIUM CHLORIDE, CALCIUM CHLORIDE 600; 310; 30; 20 MG/100ML; MG/100ML; MG/100ML; MG/100ML
9 INJECTION, SOLUTION INTRAVENOUS CONTINUOUS
Status: DISCONTINUED | OUTPATIENT
Start: 2020-12-02 | End: 2020-12-02

## 2020-12-02 RX ORDER — HYDROMORPHONE HCL 110MG/55ML
PATIENT CONTROLLED ANALGESIA SYRINGE INTRAVENOUS AS NEEDED
Status: DISCONTINUED | OUTPATIENT
Start: 2020-12-02 | End: 2020-12-02 | Stop reason: SURG

## 2020-12-02 RX ORDER — MIDAZOLAM HYDROCHLORIDE 1 MG/ML
1 INJECTION INTRAMUSCULAR; INTRAVENOUS
Status: DISCONTINUED | OUTPATIENT
Start: 2020-12-02 | End: 2020-12-02 | Stop reason: HOSPADM

## 2020-12-02 RX ORDER — EPHEDRINE SULFATE 50 MG/ML
5 INJECTION, SOLUTION INTRAVENOUS ONCE AS NEEDED
Status: DISCONTINUED | OUTPATIENT
Start: 2020-12-02 | End: 2020-12-02 | Stop reason: HOSPADM

## 2020-12-02 RX ORDER — MORPHINE SULFATE 2 MG/ML
1 INJECTION, SOLUTION INTRAMUSCULAR; INTRAVENOUS EVERY 4 HOURS PRN
Status: DISCONTINUED | OUTPATIENT
Start: 2020-12-02 | End: 2020-12-03 | Stop reason: HOSPADM

## 2020-12-02 RX ORDER — DIPHENHYDRAMINE HCL 25 MG
25 CAPSULE ORAL
Status: DISCONTINUED | OUTPATIENT
Start: 2020-12-02 | End: 2020-12-02 | Stop reason: HOSPADM

## 2020-12-02 RX ORDER — PROMETHAZINE HYDROCHLORIDE 25 MG/1
25 TABLET ORAL EVERY 6 HOURS PRN
Qty: 14 TABLET | Refills: 2 | Status: SHIPPED | OUTPATIENT
Start: 2020-12-02 | End: 2021-06-16

## 2020-12-02 RX ORDER — DIPHENHYDRAMINE HCL 25 MG
25 CAPSULE ORAL NIGHTLY PRN
Status: DISCONTINUED | OUTPATIENT
Start: 2020-12-02 | End: 2020-12-03 | Stop reason: HOSPADM

## 2020-12-02 RX ORDER — OXYCODONE HYDROCHLORIDE AND ACETAMINOPHEN 5; 325 MG/1; MG/1
1 TABLET ORAL EVERY 4 HOURS PRN
Status: DISCONTINUED | OUTPATIENT
Start: 2020-12-02 | End: 2020-12-03 | Stop reason: HOSPADM

## 2020-12-02 RX ORDER — SODIUM CHLORIDE 0.9 % (FLUSH) 0.9 %
10 SYRINGE (ML) INJECTION AS NEEDED
Status: DISCONTINUED | OUTPATIENT
Start: 2020-12-02 | End: 2020-12-02 | Stop reason: HOSPADM

## 2020-12-02 RX ORDER — DIPHENHYDRAMINE HYDROCHLORIDE 50 MG/ML
12.5 INJECTION INTRAMUSCULAR; INTRAVENOUS
Status: DISCONTINUED | OUTPATIENT
Start: 2020-12-02 | End: 2020-12-02 | Stop reason: HOSPADM

## 2020-12-02 RX ORDER — NALOXONE HCL 0.4 MG/ML
0.2 VIAL (ML) INJECTION AS NEEDED
Status: DISCONTINUED | OUTPATIENT
Start: 2020-12-02 | End: 2020-12-02 | Stop reason: HOSPADM

## 2020-12-02 RX ORDER — LIDOCAINE HYDROCHLORIDE 10 MG/ML
0.5 INJECTION, SOLUTION EPIDURAL; INFILTRATION; INTRACAUDAL; PERINEURAL ONCE AS NEEDED
Status: DISCONTINUED | OUTPATIENT
Start: 2020-12-02 | End: 2020-12-02 | Stop reason: HOSPADM

## 2020-12-02 RX ORDER — SIMETHICONE 80 MG
80 TABLET,CHEWABLE ORAL 4 TIMES DAILY PRN
Status: DISCONTINUED | OUTPATIENT
Start: 2020-12-02 | End: 2020-12-03 | Stop reason: HOSPADM

## 2020-12-02 RX ORDER — HYDROCODONE BITARTRATE AND ACETAMINOPHEN 7.5; 325 MG/1; MG/1
1 TABLET ORAL ONCE AS NEEDED
Status: COMPLETED | OUTPATIENT
Start: 2020-12-02 | End: 2020-12-02

## 2020-12-02 RX ORDER — HYDROMORPHONE HYDROCHLORIDE 1 MG/ML
0.5 INJECTION, SOLUTION INTRAMUSCULAR; INTRAVENOUS; SUBCUTANEOUS
Status: DISCONTINUED | OUTPATIENT
Start: 2020-12-02 | End: 2020-12-03 | Stop reason: HOSPADM

## 2020-12-02 RX ORDER — FENTANYL CITRATE 50 UG/ML
50 INJECTION, SOLUTION INTRAMUSCULAR; INTRAVENOUS
Status: DISCONTINUED | OUTPATIENT
Start: 2020-12-02 | End: 2020-12-02 | Stop reason: HOSPADM

## 2020-12-02 RX ORDER — DROPERIDOL 2.5 MG/ML
0.62 INJECTION, SOLUTION INTRAMUSCULAR; INTRAVENOUS ONCE AS NEEDED
Status: DISCONTINUED | OUTPATIENT
Start: 2020-12-02 | End: 2020-12-02 | Stop reason: HOSPADM

## 2020-12-02 RX ORDER — LEVOTHYROXINE AND LIOTHYRONINE 38; 9 UG/1; UG/1
60 TABLET ORAL DAILY
Status: DISCONTINUED | OUTPATIENT
Start: 2020-12-02 | End: 2020-12-03 | Stop reason: HOSPADM

## 2020-12-02 RX ORDER — DEXTROSE AND SODIUM CHLORIDE 5; .45 G/100ML; G/100ML
100 INJECTION, SOLUTION INTRAVENOUS CONTINUOUS
Status: DISCONTINUED | OUTPATIENT
Start: 2020-12-02 | End: 2020-12-03 | Stop reason: HOSPADM

## 2020-12-02 RX ORDER — MAGNESIUM HYDROXIDE 1200 MG/15ML
LIQUID ORAL AS NEEDED
Status: DISCONTINUED | OUTPATIENT
Start: 2020-12-02 | End: 2020-12-02 | Stop reason: HOSPADM

## 2020-12-02 RX ORDER — SODIUM CHLORIDE 0.9 % (FLUSH) 0.9 %
3 SYRINGE (ML) INJECTION EVERY 12 HOURS SCHEDULED
Status: DISCONTINUED | OUTPATIENT
Start: 2020-12-02 | End: 2020-12-02 | Stop reason: HOSPADM

## 2020-12-02 RX ORDER — SODIUM CHLORIDE 0.9 % (FLUSH) 0.9 %
3-10 SYRINGE (ML) INJECTION AS NEEDED
Status: DISCONTINUED | OUTPATIENT
Start: 2020-12-02 | End: 2020-12-02 | Stop reason: HOSPADM

## 2020-12-02 RX ORDER — 0.9 % SODIUM CHLORIDE 0.9 %
VIAL (ML) INJECTION AS NEEDED
Status: DISCONTINUED | OUTPATIENT
Start: 2020-12-02 | End: 2020-12-02 | Stop reason: HOSPADM

## 2020-12-02 RX ORDER — DIPHENHYDRAMINE HYDROCHLORIDE 50 MG/ML
25 INJECTION INTRAMUSCULAR; INTRAVENOUS NIGHTLY PRN
Status: DISCONTINUED | OUTPATIENT
Start: 2020-12-02 | End: 2020-12-03 | Stop reason: HOSPADM

## 2020-12-02 RX ORDER — PROMETHAZINE HYDROCHLORIDE 25 MG/1
25 SUPPOSITORY RECTAL ONCE AS NEEDED
Status: COMPLETED | OUTPATIENT
Start: 2020-12-02 | End: 2020-12-02

## 2020-12-02 RX ORDER — FLUMAZENIL 0.1 MG/ML
0.2 INJECTION INTRAVENOUS AS NEEDED
Status: DISCONTINUED | OUTPATIENT
Start: 2020-12-02 | End: 2020-12-02 | Stop reason: HOSPADM

## 2020-12-02 RX ORDER — ONDANSETRON 4 MG/1
4 TABLET, FILM COATED ORAL EVERY 6 HOURS PRN
Status: DISCONTINUED | OUTPATIENT
Start: 2020-12-02 | End: 2020-12-03 | Stop reason: HOSPADM

## 2020-12-02 RX ORDER — PROMETHAZINE HYDROCHLORIDE 25 MG/1
25 TABLET ORAL ONCE AS NEEDED
Status: COMPLETED | OUTPATIENT
Start: 2020-12-02 | End: 2020-12-02

## 2020-12-02 RX ORDER — FAMOTIDINE 10 MG/ML
20 INJECTION, SOLUTION INTRAVENOUS ONCE
Status: DISCONTINUED | OUTPATIENT
Start: 2020-12-02 | End: 2020-12-02 | Stop reason: HOSPADM

## 2020-12-02 RX ORDER — IBUPROFEN 800 MG/1
800 TABLET ORAL EVERY 8 HOURS PRN
Qty: 30 TABLET | Refills: 0 | Status: SHIPPED | OUTPATIENT
Start: 2020-12-02 | End: 2021-01-01

## 2020-12-02 RX ORDER — HYDROMORPHONE HYDROCHLORIDE 1 MG/ML
0.5 INJECTION, SOLUTION INTRAMUSCULAR; INTRAVENOUS; SUBCUTANEOUS
Status: DISCONTINUED | OUTPATIENT
Start: 2020-12-02 | End: 2020-12-02 | Stop reason: HOSPADM

## 2020-12-02 RX ORDER — ACETAMINOPHEN 650 MG/1
650 SUPPOSITORY RECTAL ONCE AS NEEDED
Status: DISCONTINUED | OUTPATIENT
Start: 2020-12-02 | End: 2020-12-02 | Stop reason: HOSPADM

## 2020-12-02 RX ORDER — ALPRAZOLAM 0.5 MG/1
0.5 TABLET ORAL NIGHTLY PRN
Status: DISCONTINUED | OUTPATIENT
Start: 2020-12-02 | End: 2020-12-03 | Stop reason: HOSPADM

## 2020-12-02 RX ORDER — ONDANSETRON 2 MG/ML
4 INJECTION INTRAMUSCULAR; INTRAVENOUS ONCE AS NEEDED
Status: DISCONTINUED | OUTPATIENT
Start: 2020-12-02 | End: 2020-12-02 | Stop reason: HOSPADM

## 2020-12-02 RX ORDER — ONDANSETRON 2 MG/ML
INJECTION INTRAMUSCULAR; INTRAVENOUS AS NEEDED
Status: DISCONTINUED | OUTPATIENT
Start: 2020-12-02 | End: 2020-12-02 | Stop reason: SURG

## 2020-12-02 RX ORDER — NALOXONE HCL 0.4 MG/ML
0.1 VIAL (ML) INJECTION
Status: DISCONTINUED | OUTPATIENT
Start: 2020-12-02 | End: 2020-12-03 | Stop reason: HOSPADM

## 2020-12-02 RX ORDER — IBUPROFEN 600 MG/1
600 TABLET ORAL EVERY 6 HOURS PRN
Status: DISCONTINUED | OUTPATIENT
Start: 2020-12-02 | End: 2020-12-03 | Stop reason: HOSPADM

## 2020-12-02 RX ORDER — BISACODYL 10 MG
10 SUPPOSITORY, RECTAL RECTAL DAILY PRN
Status: DISCONTINUED | OUTPATIENT
Start: 2020-12-02 | End: 2020-12-03 | Stop reason: HOSPADM

## 2020-12-02 RX ORDER — NALOXONE HCL 0.4 MG/ML
0.4 VIAL (ML) INJECTION
Status: DISCONTINUED | OUTPATIENT
Start: 2020-12-02 | End: 2020-12-03 | Stop reason: HOSPADM

## 2020-12-02 RX ORDER — ACETAMINOPHEN 325 MG/1
650 TABLET ORAL ONCE AS NEEDED
Status: DISCONTINUED | OUTPATIENT
Start: 2020-12-02 | End: 2020-12-02 | Stop reason: HOSPADM

## 2020-12-02 RX ORDER — LIDOCAINE HYDROCHLORIDE AND EPINEPHRINE BITARTRATE 20; .01 MG/ML; MG/ML
INJECTION, SOLUTION SUBCUTANEOUS AS NEEDED
Status: DISCONTINUED | OUTPATIENT
Start: 2020-12-02 | End: 2020-12-02 | Stop reason: HOSPADM

## 2020-12-02 RX ORDER — LIDOCAINE HYDROCHLORIDE 20 MG/ML
INJECTION, SOLUTION INFILTRATION; PERINEURAL AS NEEDED
Status: DISCONTINUED | OUTPATIENT
Start: 2020-12-02 | End: 2020-12-02 | Stop reason: SURG

## 2020-12-02 RX ORDER — OXYCODONE HYDROCHLORIDE AND ACETAMINOPHEN 5; 325 MG/1; MG/1
1 TABLET ORAL EVERY 6 HOURS PRN
Qty: 14 TABLET | Refills: 0 | Status: SHIPPED | OUTPATIENT
Start: 2020-12-02 | End: 2021-06-16

## 2020-12-02 RX ORDER — LEVOTHYROXINE AND LIOTHYRONINE 19; 4.5 UG/1; UG/1
15 TABLET ORAL DAILY
Status: DISCONTINUED | OUTPATIENT
Start: 2020-12-02 | End: 2020-12-03 | Stop reason: HOSPADM

## 2020-12-02 RX ORDER — DROPERIDOL 2.5 MG/ML
1.25 INJECTION, SOLUTION INTRAMUSCULAR; INTRAVENOUS ONCE AS NEEDED
Status: DISCONTINUED | OUTPATIENT
Start: 2020-12-02 | End: 2020-12-02 | Stop reason: HOSPADM

## 2020-12-02 RX ORDER — DEXAMETHASONE SODIUM PHOSPHATE 10 MG/ML
INJECTION INTRAMUSCULAR; INTRAVENOUS AS NEEDED
Status: DISCONTINUED | OUTPATIENT
Start: 2020-12-02 | End: 2020-12-02 | Stop reason: SURG

## 2020-12-02 RX ORDER — ONDANSETRON 2 MG/ML
4 INJECTION INTRAMUSCULAR; INTRAVENOUS EVERY 6 HOURS PRN
Status: DISCONTINUED | OUTPATIENT
Start: 2020-12-02 | End: 2020-12-03 | Stop reason: HOSPADM

## 2020-12-02 RX ORDER — PROPOFOL 10 MG/ML
VIAL (ML) INTRAVENOUS AS NEEDED
Status: DISCONTINUED | OUTPATIENT
Start: 2020-12-02 | End: 2020-12-02 | Stop reason: SURG

## 2020-12-02 RX ORDER — SCOLOPAMINE TRANSDERMAL SYSTEM 1 MG/1
1 PATCH, EXTENDED RELEASE TRANSDERMAL
Status: DISCONTINUED | OUTPATIENT
Start: 2020-12-02 | End: 2020-12-02

## 2020-12-02 RX ORDER — CEFAZOLIN SODIUM 2 G/100ML
INJECTION, SOLUTION INTRAVENOUS AS NEEDED
Status: DISCONTINUED | OUTPATIENT
Start: 2020-12-02 | End: 2020-12-02 | Stop reason: SURG

## 2020-12-02 RX ORDER — FAMOTIDINE 10 MG/ML
20 INJECTION, SOLUTION INTRAVENOUS ONCE
Status: COMPLETED | OUTPATIENT
Start: 2020-12-02 | End: 2020-12-02

## 2020-12-02 RX ORDER — EPHEDRINE SULFATE 50 MG/ML
INJECTION, SOLUTION INTRAVENOUS AS NEEDED
Status: DISCONTINUED | OUTPATIENT
Start: 2020-12-02 | End: 2020-12-02 | Stop reason: SURG

## 2020-12-02 RX ADMIN — FENTANYL CITRATE 50 MCG: 50 INJECTION INTRAMUSCULAR; INTRAVENOUS at 13:08

## 2020-12-02 RX ADMIN — DEXTROSE AND SODIUM CHLORIDE 100 ML/HR: 5; 450 INJECTION, SOLUTION INTRAVENOUS at 16:16

## 2020-12-02 RX ADMIN — FENTANYL CITRATE 25 MCG: 50 INJECTION INTRAMUSCULAR; INTRAVENOUS at 13:24

## 2020-12-02 RX ADMIN — SODIUM CHLORIDE, POTASSIUM CHLORIDE, SODIUM LACTATE AND CALCIUM CHLORIDE: 600; 310; 30; 20 INJECTION, SOLUTION INTRAVENOUS at 13:55

## 2020-12-02 RX ADMIN — PROMETHAZINE HYDROCHLORIDE 25 MG: 25 TABLET ORAL at 14:24

## 2020-12-02 RX ADMIN — LIDOCAINE HYDROCHLORIDE 60 MG: 20 INJECTION, SOLUTION INFILTRATION; PERINEURAL at 13:08

## 2020-12-02 RX ADMIN — ONDANSETRON HYDROCHLORIDE 4 MG: 2 SOLUTION INTRAMUSCULAR; INTRAVENOUS at 13:54

## 2020-12-02 RX ADMIN — EPHEDRINE SULFATE 5 MG: 50 INJECTION INTRAVENOUS at 13:42

## 2020-12-02 RX ADMIN — PROPOFOL 200 MG: 10 INJECTION, EMULSION INTRAVENOUS at 13:08

## 2020-12-02 RX ADMIN — EPHEDRINE SULFATE 5 MG: 50 INJECTION INTRAVENOUS at 13:51

## 2020-12-02 RX ADMIN — SODIUM CHLORIDE, POTASSIUM CHLORIDE, SODIUM LACTATE AND CALCIUM CHLORIDE 9 ML/HR: 600; 310; 30; 20 INJECTION, SOLUTION INTRAVENOUS at 12:47

## 2020-12-02 RX ADMIN — HYDROMORPHONE HYDROCHLORIDE 0.5 MG: 2 INJECTION, SOLUTION INTRAMUSCULAR; INTRAVENOUS; SUBCUTANEOUS at 13:59

## 2020-12-02 RX ADMIN — FENTANYL CITRATE 25 MCG: 50 INJECTION INTRAMUSCULAR; INTRAVENOUS at 13:15

## 2020-12-02 RX ADMIN — FAMOTIDINE 20 MG: 10 INJECTION INTRAVENOUS at 12:47

## 2020-12-02 RX ADMIN — OXYCODONE HYDROCHLORIDE AND ACETAMINOPHEN 1 TABLET: 5; 325 TABLET ORAL at 22:47

## 2020-12-02 RX ADMIN — OXYCODONE HYDROCHLORIDE AND ACETAMINOPHEN 1 TABLET: 5; 325 TABLET ORAL at 18:46

## 2020-12-02 RX ADMIN — IBUPROFEN 600 MG: 600 TABLET, FILM COATED ORAL at 16:13

## 2020-12-02 RX ADMIN — ALPRAZOLAM 0.5 MG: 0.5 TABLET ORAL at 20:48

## 2020-12-02 RX ADMIN — MIDAZOLAM 1 MG: 1 INJECTION INTRAMUSCULAR; INTRAVENOUS at 12:47

## 2020-12-02 RX ADMIN — HYDROCODONE BITARTRATE AND ACETAMINOPHEN 1 TABLET: 7.5; 325 TABLET ORAL at 14:54

## 2020-12-02 RX ADMIN — CEFAZOLIN SODIUM 2 G: 2 INJECTION, SOLUTION INTRAVENOUS at 13:14

## 2020-12-02 RX ADMIN — HYDROMORPHONE HYDROCHLORIDE 0.5 MG: 1 INJECTION, SOLUTION INTRAMUSCULAR; INTRAVENOUS; SUBCUTANEOUS at 14:45

## 2020-12-02 RX ADMIN — DEXAMETHASONE SODIUM PHOSPHATE 8 MG: 10 INJECTION INTRAMUSCULAR; INTRAVENOUS at 13:14

## 2020-12-02 RX ADMIN — IBUPROFEN 600 MG: 600 TABLET, FILM COATED ORAL at 22:17

## 2020-12-02 RX ADMIN — HYDROMORPHONE HYDROCHLORIDE 0.5 MG: 1 INJECTION, SOLUTION INTRAMUSCULAR; INTRAVENOUS; SUBCUTANEOUS at 14:24

## 2020-12-02 RX ADMIN — SCOPALAMINE 1 PATCH: 1 PATCH, EXTENDED RELEASE TRANSDERMAL at 12:47

## 2020-12-02 RX ADMIN — DOCUSATE SODIUM 100 MG: 100 CAPSULE ORAL at 16:13

## 2020-12-02 NOTE — ANESTHESIA PROCEDURE NOTES
Airway  Urgency: elective    Date/Time: 12/2/2020 1:09 PM  Airway not difficult    General Information and Staff    Patient location during procedure: OR  Anesthesiologist: Keyon Valentino MD  CRNA: Asuncion Thacker CRNA    Indications and Patient Condition  Indications for airway management: airway protection    Preoxygenated: yes (Pt pre-O2 with 100% O2)  Mask difficulty assessment: 0 - not attempted    Final Airway Details  Final airway type: supraglottic airway      Successful airway: classic  Size 4    Number of attempts at approach: 1  Assessment: lips, teeth, and gum same as pre-op and atraumatic intubation    Additional Comments  ATOLMA x1. No change in dentition. + ETCO2. Airway seal pressure <20cm H2O.

## 2020-12-02 NOTE — PLAN OF CARE
Goal Outcome Evaluation:  Plan of Care Reviewed With: patient  Progress: improving  Outcome Summary: VSS. anay diet. vag packing intact and pink tinged but no vag bleeding. f/c with adeq urine output. f/c and packing to be removed in AM.

## 2020-12-02 NOTE — H&P
[facility]  Katia Puente  : 1970  MRN: [id number]  CSN: [id number]    History and Physical  No chief complaint on file.    Subjective   Katia Puente is a 50 y.o. year old  who present for surgery due to worsening rectocele.  Patient's had a vaginal hysterectomy with a sling procedure.  This was done at another office and she had some complications following that.  She has been known to have a recurrent rectocele and over the past several years that has worsened to the point that it symptomatic enough that she now has to put a finger in the vagina to initiate bowel movements.  She is admitted for a posterior repair.  Risk benefits potential complications of bleeding infection injury to other organs possible need for corrective surgeries have been discussed.  Patient is willing to accept those risk and proceed.  Recurrence of rectocele and subsequent cystocele discussed..    Past Medical History:   Diagnosis Date   • Allergic    • Anxiety    • Breast pain, left    • Elevated cholesterol    • Heart murmur     AS CHILD   • Hemorrhoids 2012   • History of 2019 novel coronavirus disease (COVID-19)    • Hypothyroidism    • Rectocele 10/26/2020   • Vaginal atrophy      Past Surgical History:   Procedure Laterality Date   • BACK SURGERY      L4, L5 discectomy   • BACK SURGERY  2019       • BREAST SURGERY     • CHOLECYSTECTOMY     • COLONOSCOPY     • ENDOSCOPY  2013   • GALLBLADDER SURGERY     • HEMORRHOIDECTOMY     • HYSTERECTOMY  2008    VAGINAL   • LASIK     • TONSILLECTOMY       Social History    Tobacco Use      Smoking status: Never Smoker      Smokeless tobacco: Never Used    No current facility-administered medications for this encounter.     Current Outpatient Medications:   •  ALPRAZolam (XANAX) 0.5 MG tablet, Take 0.5 mg by mouth At Night As Needed., Disp: , Rfl:   •  CHAZ THYROID 15 MG tablet, Take 15 mg by mouth Daily., Disp: , Rfl:   •  CHAZ  THYROID 60 MG PO tablet, Take 60 mg by mouth Daily., Disp: , Rfl:   •  cetirizine (zyrTEC) 5 MG tablet, Take 5 mg by mouth Daily., Disp: , Rfl:   •  CHLORHEXIDINE GLUCONATE CLOTH EX, Apply  topically. USE AS DIRECTED, Disp: , Rfl:   •  clotrimazole-betamethasone (Lotrisone) 1-0.05 % cream, Apply  topically to the appropriate area as directed Every 12 (Twelve) Hours. (Patient taking differently: Apply  topically to the appropriate area as directed As Needed.), Disp: 15 g, Rfl: 0  •  Fish Oil-Cholecalciferol (FISH OIL + D3 PO), Take 1 tablet/day by mouth Daily. HOLDING FOR SURGERY, Disp: , Rfl:   •  IRON PO, Take 1 tablet by mouth Daily. HOLDING FOR SURGERY, Disp: , Rfl:   •  multivitamin with minerals (MULTIVITAMIN ADULTS PO), Take 1 tablet by mouth Daily. HOLDING FOR SURGERY, Disp: , Rfl:   •  rosuvastatin (CRESTOR) 5 MG tablet, Take 5 mg by mouth Every Night., Disp: , Rfl:   •  vitamin E 400 UNIT capsule, Take 400 Units by mouth Daily. HOLDING FOR SURGERY, Disp: , Rfl:   •  VYVANSE 50 MG capsule, Take 50 mg by mouth Daily HOLD FOR 48HOURS PRIOR TO SURGERY , Disp: , Rfl: 0    Allergies   Allergen Reactions   • Codeine Nausea And Vomiting   • Erythromycin Nausea And Vomiting       Review of Systems      Except as outlined in history of physical illness, patient denies any changes in her GYN, , GI systems. All other systems reviewed are negative.        Objective   There were no vitals taken for this visit.  General: well developed; well nourished  no acute distress  appears stated age   Heart: regular rate and rhythm, S1, S2 normal, no murmur, click, rub or gallop   Lungs: breathing is unlabored  clear to auscultation bilaterally   Abdomen: soft, non-tender; no masses  no umbilical or inguinal hernias are present  no hepato-splenomegaly   Pelvis:: Clinical staff was present for exam  External genitalia consistent with normal female, vagina shows 1/3 degree rectocele.  Vaginal cuff is moderately well supported there  is minimal relaxation anteriorly.  Cervix and uterus are absent adnexa are not enlarged.  Rectal exam is free of any masses and appears normal   Labs  Lab Results   Component Value Date     11/30/2020    HGB 13.0 11/30/2020    HCT 38.6 11/30/2020    WBC 7.11 11/30/2020     11/30/2020    K 3.5 11/30/2020     11/30/2020    CO2 28.4 11/30/2020    BUN 9 11/30/2020    CREATININE 0.69 11/30/2020    GLUCOSE 95 11/30/2020    ALBUMIN 3.90 12/08/2019    CALCIUM 9.0 11/30/2020    AST 26 12/08/2019    ALT 34 (H) 12/08/2019    BILITOT <0.2 (L) 12/08/2019        Assessment   1. Worsening rectocele  2. History of vaginal hysterectomy and sling procedure in the past     Plan   1. Exam under anesthesia, posterior repair of rectocele    Rene Kaur MD  12/2/2020  09:03 EST       EMR Dragon/ Transcription disclaimer:  Much of the encounter note is an electronic transcription/translation of spoken language to printed text. The electronic translation of spoken language may permit erroneous, or at times, nonessential words or phrases to be inadvertently transcribes; Although i have reviewed the note for such errors, some may still exist.

## 2020-12-02 NOTE — ANESTHESIA PREPROCEDURE EVALUATION
Anesthesia Evaluation     Patient summary reviewed and Nursing notes reviewed   history of anesthetic complications: PONV  NPO Solid Status: > 8 hours  NPO Liquid Status: > 2 hours           Airway   Mallampati: II  TM distance: >3 FB  Neck ROM: full  no difficulty expected  Dental - normal exam     Pulmonary - negative pulmonary ROS and normal exam   (-) decreased breath sounds, wheezes  Cardiovascular - normal exam  Exercise tolerance: good (4-7 METS)    (+) valvular problems/murmurs murmur, hyperlipidemia,   (-) hypertension      Neuro/Psych- negative ROS  (-) seizures, CVA  GI/Hepatic/Renal/Endo    (+)   thyroid problem hypothyroidism  (-) diabetes    Musculoskeletal (-) negative ROS    Abdominal  - normal exam   Substance History - negative use  (-) alcohol use, drug use     OB/GYN negative ob/gyn ROS         Other - negative ROS                     Anesthesia Plan    ASA 2     general   (Says if she doesn't get phenergan she gets PONV)  intravenous induction     Anesthetic plan, all risks, benefits, and alternatives have been provided, discussed and informed consent has been obtained with: patient.    Plan discussed with CRNA.

## 2020-12-02 NOTE — OP NOTE
.ric  Subjective     Date of Service:  12/02/20  Time of Service:  14:25 EST    Surgical Staff: Surgeon(s) and Role:     * Rene Kaur MD - Primary   Additional Staff:    Pre-operative diagnosis(es): Pre-Op Diagnosis Codes:     * Rectocele [N81.6]     Post-operative diagnosis(es): Post-Op Diagnosis Codes:     * Rectocele [N81.6]   Procedure(s): Procedure(s):  POSTERIOR VAGINAL REPAIR     Antibiotics: cefazolin (Ancef) ordered on call to OR     Anesthesia: Type: General  ASA:  II     Objective      Operative findings:  Third-degree rectocele   Specimens removed: ID Type Source Tests Collected by Time   A : vaginal mucosa Tissue Vagina TISSUE PATHOLOGY EXAM Rene Kaur MD 12/2/2020 1355      Fluid Intake:  100 mL   Output: Documented Output  Est. Blood Loss less than 5 cc mL  Urine Output 75 mL  Other Output none mL  NG/OG Output none mL    I/O this shift:  In: 1000 [I.V.:1000]  Out: 115 [Urine:100; Blood:15]     Blood products used: No   Drains: * No LDAs found *   Implant Information: Nothing was implanted during the procedure   Complications:  None   Intraoperative consult(s):    Condition: stable   Disposition: to PACU and then admit to  medical - surgical floor         Assessment/Plan     After reviewing procedure risk benefits potential complications patient was taken to the OR.  She is prepped and draped in usual fashion.  Bladder was drained.  Exam under anesthesia was performed and revealed 1/3 degree rectocele.  Vaginal mucosa was grasped with Allis clamps at the 5 and 7:00.  2% Xylocaine was used to infiltrate the vaginal mucosa and the perineum.  A triangle wedge-shaped piece of tissue was removed using a scalpel on the perineum.  Vaginal mucosa was then undermined starting at the introitus at 6:00 working the way up towards the apex.  This was done by grasping the mucosa with Allis clamps sequentially.  Mucosa was incised with Metzenbaums.  Mucosa was then dissected off the underlying vesicle  rectal fascia.  The rectocele was reduced.  Healthy tissue was grasped laterally and imbricated in 3 separate layers using 2-0 chromic.  This reduced the rectocele and reinforced with a nice healthy layer of supporting tissue.  Excess vaginal mucosa was trimmed.  Vaginal necrosis closed with 2-0 chromic.  Perineorrhaphy was performed with 2-0 Vicryl.  Everything was hemostatic.  Patient tolerated the procedure well.  Vaginal packing was placed.  Farmer was inserted.  Clear urine was noted.  Patient was awakened from anesthesia and taken recovery room in stable condition.              Rene Kaur MD  12/02/20  14:24 EST

## 2020-12-03 ENCOUNTER — READMISSION MANAGEMENT (OUTPATIENT)
Dept: CALL CENTER | Facility: HOSPITAL | Age: 50
End: 2020-12-03

## 2020-12-03 VITALS
WEIGHT: 126 LBS | SYSTOLIC BLOOD PRESSURE: 100 MMHG | TEMPERATURE: 96.8 F | BODY MASS INDEX: 23.19 KG/M2 | HEIGHT: 62 IN | RESPIRATION RATE: 16 BRPM | OXYGEN SATURATION: 99 % | DIASTOLIC BLOOD PRESSURE: 62 MMHG | HEART RATE: 64 BPM

## 2020-12-03 LAB
DEPRECATED RDW RBC AUTO: 43.3 FL (ref 37–54)
ERYTHROCYTE [DISTWIDTH] IN BLOOD BY AUTOMATED COUNT: 12.6 % (ref 12.3–15.4)
HCT VFR BLD AUTO: 31.9 % (ref 34–46.6)
HGB BLD-MCNC: 10.7 G/DL (ref 12–15.9)
LAB AP CASE REPORT: NORMAL
MCH RBC QN AUTO: 31.7 PG (ref 26.6–33)
MCHC RBC AUTO-ENTMCNC: 33.5 G/DL (ref 31.5–35.7)
MCV RBC AUTO: 94.4 FL (ref 79–97)
PATH REPORT.FINAL DX SPEC: NORMAL
PATH REPORT.GROSS SPEC: NORMAL
PLATELET # BLD AUTO: 323 10*3/MM3 (ref 140–450)
PMV BLD AUTO: 10.1 FL (ref 6–12)
RBC # BLD AUTO: 3.38 10*6/MM3 (ref 3.77–5.28)
WBC # BLD AUTO: 10.74 10*3/MM3 (ref 3.4–10.8)

## 2020-12-03 PROCEDURE — 99024 POSTOP FOLLOW-UP VISIT: CPT | Performed by: OBSTETRICS & GYNECOLOGY

## 2020-12-03 PROCEDURE — 85027 COMPLETE CBC AUTOMATED: CPT | Performed by: OBSTETRICS & GYNECOLOGY

## 2020-12-03 PROCEDURE — G0378 HOSPITAL OBSERVATION PER HR: HCPCS

## 2020-12-03 RX ORDER — DOCUSATE SODIUM 100 MG/1
100 CAPSULE, LIQUID FILLED ORAL 2 TIMES DAILY
Qty: 60 CAPSULE | Refills: 1 | Status: SHIPPED | OUTPATIENT
Start: 2020-12-03

## 2020-12-03 RX ADMIN — IBUPROFEN 600 MG: 600 TABLET, FILM COATED ORAL at 08:26

## 2020-12-03 RX ADMIN — OXYCODONE HYDROCHLORIDE AND ACETAMINOPHEN 1 TABLET: 5; 325 TABLET ORAL at 05:31

## 2020-12-03 RX ADMIN — LEVOTHYROXINE, LIOTHYRONINE 60 MG: 38; 9 TABLET ORAL at 10:42

## 2020-12-03 RX ADMIN — OXYCODONE HYDROCHLORIDE AND ACETAMINOPHEN 1 TABLET: 5; 325 TABLET ORAL at 10:42

## 2020-12-03 RX ADMIN — DEXTROSE AND SODIUM CHLORIDE 100 ML/HR: 5; 450 INJECTION, SOLUTION INTRAVENOUS at 02:14

## 2020-12-03 NOTE — PROGRESS NOTES
Case Management Discharge Note      Final Note: Discharged home. Catherine Joyner, FUAD         Transportation Services  Private: Car    Final Discharge Disposition Code: 01 - home or self-care

## 2020-12-03 NOTE — PLAN OF CARE
Goal Outcome Evaluation:  VSS, voiding w/o difficulty, no vaginal bleeding, medicated for pain x 1 with relief, d/c home  Plan of Care Reviewed With: patient  Progress: improving

## 2020-12-03 NOTE — PLAN OF CARE
Goal Outcome Evaluation:  Plan of Care Reviewed With: patient  Progress: improving  Outcome Summary: Adequate pain control with PO pain meds, vss, afebrile, silva has good urine output, silva and vaginal packing to be removed this morning,

## 2020-12-03 NOTE — OUTREACH NOTE
Prep Survey      Responses   [facility] patient discharged from?  [place]   Is LACE score < 7 ?  Yes   Eligibility  [facility]   Date of Admission  12/02/20   Date of Discharge  12/03/20   Discharge diagnosis  Rectocele,  posterior vaginal repair   Does the patient have one of the following disease processes/diagnoses(primary or secondary)?  General Surgery   Does the patient have Home health ordered?  No   Is there a DME ordered?  No   Prep survey completed?  Yes          Ayla Wheeler RN

## 2020-12-03 NOTE — PROGRESS NOTES
Continued Stay Note  [facility]     Patient Name: Katia Puente  MRN: [id number]  Today's Date: 12/3/2020    Admit Date: 12/2/2020    Discharge Plan     Row Name 12/03/20 1302       Plan    Plan  Home no needs    Plan Comments  Discharge order noted. Met with patient who confirmed DC plan is home. Stated spouse will assist as needed and will provide transportation at DC. Denies any needs/equipment.        Discharge Codes    No documentation.       Expected Discharge Date and Time     Expected Discharge Date Expected Discharge Time    Dec 3, 2020             Catherine Joyner RN

## 2020-12-03 NOTE — DISCHARGE SUMMARY
Discharge summary      Katia is a very pleasant 50-year-old white female who underwent a posterior repair yesterday afternoon.  She did well she had very little blood loss at time of surgery, anesthetic was tolerated well.  She requested discharge this morning she was tolerating regular food ambulating passing flatus.  Physical exam is reassuring.  She was discharged with the prescriptions for Percocet Motrin and Phenergan and Colace.  She will return the office in 4 weeks time she will call us with any problems questions or concerns.  Please call the office or the OB/GYN on-call if after-hours, for any questions, concerns or any of the followin) Fever - a temperature greater than 100.4  2) Uncontrolled pain  3) Heavy  Bleeding   (soaking more than 1 pad in an hour)  4) Foul-smelling discharge from the vagina    Do not place anything in the vagina - this includes tampons, douches or having sex - until after your 6 week visit to prevent infection.

## 2020-12-04 ENCOUNTER — TELEPHONE (OUTPATIENT)
Dept: OBSTETRICS AND GYNECOLOGY | Age: 50
End: 2020-12-04

## 2020-12-04 ENCOUNTER — TRANSITIONAL CARE MANAGEMENT TELEPHONE ENCOUNTER (OUTPATIENT)
Dept: CALL CENTER | Facility: HOSPITAL | Age: 50
End: 2020-12-04

## 2020-12-04 ENCOUNTER — NURSE TRIAGE (OUTPATIENT)
Dept: CALL CENTER | Facility: HOSPITAL | Age: 50
End: 2020-12-04

## 2020-12-04 NOTE — TELEPHONE ENCOUNTER
Dr larios pt had posterior vaginal repair surgery 2 days ago, she is taking stool softeners and laxatives but they are not helping. She is wondering if she can take a suppository or if there is something else that she can do? She feels like she needs to have a bowel movement but nothing is coming out when lightly pushing and shes scared to push too hard. Please advise      [phone]

## 2020-12-04 NOTE — TELEPHONE ENCOUNTER
Discussed pain medication and constipation Drinking plenty of fluids taking stool softners. Advised MOM or Miralax. Asking about suppos, advised to call surgeon before using suppos.  Reason for Disposition  • Mild constipation    Additional Information  • Negative: [1] Abdomen pain is main symptom AND [2] male  • Negative: [1] Abdomen pain is main symptom AND [2] adult female  • Negative: Rectal bleeding or blood in stool is main symptom  • Negative: Rectal pain or itching is main symptom  • Negative: Constipation in a cancer patient who is currently (or recently) receiving chemotherapy or radiation therapy, or cancer patient who has metastatic or end-stage cancer and is receiving palliative care  • Negative: Patient sounds very sick or weak to the triager  • Negative: [1] Vomiting AND [2] abdomen looks much more swollen than usual  • Negative: [1] Vomiting AND [2] contains bile (green color)  • Negative: [1] Constant abdominal pain AND [2] present > 2 hours  • Negative: [1] Rectal pain or fullness from fecal impaction (rectum full of stool) AND [2] NOT better after SITZ bath, suppository or enema  • Negative: [1] Intermittent mild abdominal pain AND [2] fever  • Negative: Abdomen is more swollen than usual  • Negative: Last bowel movement (BM) > 4 days ago  • Negative: Leaking stool  • Negative: Unable to have a bowel movement (BM) without manually removing stool (using finger to pull out stool or perform disimpaction)  • Negative: Unable to have a bowel movement (BM) without laxative or enema  • Negative: [1] Weight loss > 10 pounds (5 kg) AND [2] not dieting  • Negative: Pencil-like, narrow stools  • Negative: Taking new prescription medication  • Negative: [1] Minor bleeding from rectum (e.g., blood just on toilet paper, few drops, streaks on surface of normal formed BM) AND [2] 3 or more times  • Negative: [1] Uses laxative (e.g., PEG / Miralax. Milk of magnesia) or enema AND [2] > once a month  • Negative:  "Constipation is a chronic symptom (recurrent or ongoing AND present > 4 weeks)  • Negative: Sounds like a life-threatening emergency to the triager  • Negative: Chest pain  • Negative: Difficulty breathing  • Negative: Acting confused (e.g., disoriented, slurred speech) or excessively sleepy  • Negative: Surgical incision symptoms and questions  • Negative: [1] Discomfort (pain, burning or stinging) when passing urine AND [2] male  • Negative: [1] Discomfort (pain, burning or stinging) when passing urine AND [2] female  • Negative: New or worsening leg (calf, thigh) pain  • Negative: New or worsening leg swelling  • Negative: Dizziness is severe, or persists > 24 hours after surgery  • Constipation  • Negative: [1] Constipation persists > 1 week AND [2] no improvement after using CARE ADVICE    Answer Assessment - Initial Assessment Questions  1. SYMPTOM: \"What's the main symptom you're concerned about?\" (e.g., pain, fever, vomiting)      constipation  2. ONSET: \"When did *No Answer*  start?\"      *No Answer*  3. SURGERY: \"What surgery was performed?\"  A& P repair  4. DATE of SURGERY: \"When was surgery performed?\"   120/2020  5. ANESTHESIA: \" What type of anesthesia did you have?\" (e.g., general, spinal, epidural, local)  general  6. PAIN: \"Is there any pain?\" If so, ask: \"How bad is it?\"  (Scale 1-10; or mild, moderate, severe)  postop  7. FEVER: \"Do you have a fever?\" If so, ask: \"What is your temperature, how was it measured, and when did it start?\"  na  8. VOMITING: \"Is there any vomiting?\" If yes, ask: \"How many times?\"      *No Answer*  9. BLEEDING: \"Is there any bleeding?\" If so, ask: \"How much?\" and \"Where?\"      *No Answer*  10. OTHER SYMPTOMS: \"Do you have any other symptoms?\" (e.g., drainage from wound, painful urination, constipation)        *No Answer*    Answer Assessment - Initial Assessment Questions  1. STOOL PATTERN OR FREQUENCY: \"How often do you pass bowel movements (BMs)?\"  (Normal range: tid to " "q 3 days)  \"When was the last BM passed?\"       Yesterday very small was loose  2. STRAINING: \"Do you have to strain to have a BM?\"       *No Answer*  3. RECTAL PAIN: \"Does your rectum hurt when the stool comes out?\" If so, ask: \"Do you have hemorrhoids? How bad is the pain?\"  (Scale 1-10; or mild, moderate, severe)     Post op surgery  4. STOOL COMPOSITION: \"Are the stools hard?\"       *No Answer*  5. BLOOD ON STOOLS: \"Has there been any blood on the toilet tissue or on the surface of the BM?\" If so, ask: \"When was the last time?\"       *No Answer*  6. CHRONIC CONSTIPATION: \"Is this a new problem for you?\"  If no, ask: \"How long have you had this problem?\" (days, weeks, months)       *No Answer*  7. CHANGES IN DIET: \"Have there been any recent changes in your diet?\"       *No Answer*  8. MEDICATIONS: \"Have you been taking any new medications?\"  Pain medication  9. LAXATIVES: \"Have you been using any laxatives or enemas?\"  If yes, ask \"What, how often, and when was the last time?\"  Stool softners  10. CAUSE: \"What do you think is causing the constipation?\"    pain medication  11. OTHER SYMPTOMS: \"Do you have any other symptoms?\" (e.g., abdominal pain, fever, vomiting)    no  12. PREGNANCY: \"Is there any chance you are pregnant?\" \"When was your last menstrual period?\"        *No Answer*    Protocols used: CONSTIPATION-ADULT-, POST-OP SYMPTOMS AND QUESTIONS-ADULT-AH      "

## 2020-12-04 NOTE — OUTREACH NOTE
Call Center TCM Note      Responses   [facility] patient discharged from?  [place]   Does the patient have one of the following disease processes/diagnoses(primary or secondary)?  General Surgery   TCM attempt successful?  Yes   Call start time  1458   Call end time  1502   Discharge diagnosis  Rectocele,  posterior vaginal repair   Meds reviewed with patient/caregiver?  Yes   Is the patient having any side effects they believe may be caused by any medication additions or changes?  Yes   Side effects comments   Constipation   Does the patient have all medications related to this admission filled (includes all antibiotics, pain medications, etc.)  Yes   Is the patient taking all medications as directed (includes completed medication regime)?  Yes   Does the patient have a follow up appointment scheduled with their surgeon?  Yes   Has the patient kept scheduled appointments due by today?  N/A   Comments  Hospital d/c f/u appt is on 12/10/20 at 1:30 pm    Psychosocial issues?  No   Did the patient receive a copy of their discharge instructions?  Yes   Nursing interventions  Reviewed instructions with patient   What is the patient's perception of their health status since discharge?  Improving   Nursing interventions  Nurse provided patient education   Is the patient /caregiver able to teach back basic post-op care?  No tub bath, swimming, or hot tub until instructed by MD, Lifting as instructed by MD in discharge instructions, Drive as instructed by MD in discharge instructions   Is the patient/caregiver able to teach back signs and symptoms of incisional infection?  Fever   Is the patient/caregiver able to teach back steps to recovery at home?  Rest and rebuild strength, gradually increase activity   If the patient is a current smoker, are they able to teach back resources for cessation?  Not a smoker   Is the patient/caregiver able to teach back the hierarchy of who to call/visit for symptoms/problems? PCP,  Specialist, Home health nurse, Urgent Care, ED, 911  Yes   TCM call completed?  Yes          Katia Monahan RN    12/4/2020, 15:03 EST

## 2020-12-08 ENCOUNTER — TELEPHONE (OUTPATIENT)
Dept: OBSTETRICS AND GYNECOLOGY | Age: 50
End: 2020-12-08

## 2020-12-08 RX ORDER — SULFAMETHOXAZOLE AND TRIMETHOPRIM 400; 80 MG/1; MG/1
1 TABLET ORAL 2 TIMES DAILY
Qty: 10 TABLET | Refills: 0 | Status: SHIPPED | OUTPATIENT
Start: 2020-12-08 | End: 2020-12-13

## 2020-12-08 RX ORDER — SULFAMETHOXAZOLE AND TRIMETHOPRIM 400; 80 MG/1; MG/1
1 TABLET ORAL 2 TIMES DAILY
Qty: 10 TABLET | Refills: 0 | Status: SHIPPED | OUTPATIENT
Start: 2020-12-08 | End: 2020-12-08 | Stop reason: SDUPTHER

## 2020-12-08 RX ORDER — FLUCONAZOLE 150 MG/1
150 TABLET ORAL DAILY
Qty: 3 TABLET | Refills: 0 | Status: SHIPPED | OUTPATIENT
Start: 2020-12-08 | End: 2020-12-10

## 2020-12-08 NOTE — TELEPHONE ENCOUNTER
Pt called ER line, meds not at pharmacy  Turns out sent to wrong pharmacy.  Was confirmed over phone but not changed and meds went to [facility] instead of [facility], sent antibiotic to [facility]

## 2020-12-08 NOTE — TELEPHONE ENCOUNTER
Patient called, believes she has a UTI and would like to have something sent to her pharmacy.    Symptoms: burning & frequent/ urge to use the restroom, cloudy urine.      Patient would also like to be prescribed some diflucan for yeast infection

## 2020-12-09 ENCOUNTER — TELEPHONE (OUTPATIENT)
Dept: OBSTETRICS AND GYNECOLOGY | Age: 50
End: 2020-12-09

## 2020-12-09 NOTE — TELEPHONE ENCOUNTER
Patient called, stated that she needs her diflucan medication sent over to [facility] pharmacy that's on file.

## 2020-12-10 ENCOUNTER — OFFICE VISIT (OUTPATIENT)
Dept: FAMILY MEDICINE CLINIC | Facility: CLINIC | Age: 50
End: 2020-12-10

## 2020-12-10 VITALS
RESPIRATION RATE: 18 BRPM | BODY MASS INDEX: 22.26 KG/M2 | HEIGHT: 62 IN | HEART RATE: 96 BPM | TEMPERATURE: 97.1 F | WEIGHT: 121 LBS | SYSTOLIC BLOOD PRESSURE: 132 MMHG | DIASTOLIC BLOOD PRESSURE: 86 MMHG

## 2020-12-10 DIAGNOSIS — Z09 HOSPITAL DISCHARGE FOLLOW-UP: ICD-10-CM

## 2020-12-10 DIAGNOSIS — N81.6 RECTOCELE: Primary | ICD-10-CM

## 2020-12-10 PROCEDURE — 99024 POSTOP FOLLOW-UP VISIT: CPT | Performed by: FAMILY MEDICINE

## 2020-12-10 NOTE — PROGRESS NOTES
"Subjective   Katia Puente is a 50 y.o. female.     CC: Hospital F/U for Rectocele Repair    History of Present Illness     Pt returns today after rectocele repair last week. That report was as follows:    Katia is a very pleasant 50-year-old white female who underwent a posterior repair yesterday afternoon.  She did well she had very little blood loss at time of surgery, anesthetic was tolerated well.  She requested discharge this morning she was tolerating regular food ambulating passing flatus.  Physical exam is reassuring.  She was discharged with the prescriptions for Percocet Motrin and Phenergan and Colace.  She will return the office in 4 weeks time she will call us with any problems questions or concerns.  Please call the office or the OB/GYN on-call if after-hours, for any questions, concerns or any of the following:    Current outpatient and discharge medications have been reconciled for the patient.  Reviewed by: Rene Rivera MD    BPs at home averaging 120's-130's/low to mid 80's.     The following portions of the patient's history were reviewed and updated as appropriate: allergies, current medications, past family history, past medical history, past social history, past surgical history and problem list.    Review of Systems   Constitutional: Negative for activity change, chills and fever.   Respiratory: Negative for cough.    Cardiovascular: Negative for chest pain.   Psychiatric/Behavioral: Negative for dysphoric mood.       /86   Pulse 96   Temp 97.1 °F (36.2 °C) (Oral)   Resp 18   Ht 157.5 cm (62\")   Wt 54.9 kg (121 lb)   BMI 22.13 kg/m²     Objective   Physical Exam  Constitutional:       General: She is not in acute distress.     Appearance: She is well-developed.   Cardiovascular:      Rate and Rhythm: Normal rate and regular rhythm.   Pulmonary:      Effort: Pulmonary effort is normal.      Breath sounds: Normal breath sounds.   Neurological:      Mental Status: She is alert " and oriented to person, place, and time.   Psychiatric:         Behavior: Behavior normal.         Thought Content: Thought content normal.     Hospital records reviewed with pt confirming HPI.      Assessment/Plan   Diagnoses and all orders for this visit:    1. Rectocele (Primary)  Comments:  s/p repair    2. Hospital discharge follow-up    Close f/u with her GYN discussed.

## 2020-12-15 ENCOUNTER — TELEPHONE (OUTPATIENT)
Dept: FAMILY MEDICINE CLINIC | Facility: CLINIC | Age: 50
End: 2020-12-15

## 2020-12-15 NOTE — TELEPHONE ENCOUNTER
Patient calling had a CT SCAN done and found a soft tissue lesion of the Abd area. Does pt need to to do anything regarding this .

## 2020-12-17 ENCOUNTER — OFFICE VISIT (OUTPATIENT)
Dept: FAMILY MEDICINE CLINIC | Facility: CLINIC | Age: 50
End: 2020-12-17

## 2020-12-17 VITALS
SYSTOLIC BLOOD PRESSURE: 135 MMHG | WEIGHT: 121 LBS | TEMPERATURE: 97.4 F | HEIGHT: 62 IN | BODY MASS INDEX: 22.26 KG/M2 | HEART RATE: 76 BPM | RESPIRATION RATE: 16 BRPM | DIASTOLIC BLOOD PRESSURE: 93 MMHG

## 2020-12-17 DIAGNOSIS — R93.5 ABNORMAL CT OF THE ABDOMEN: Primary | ICD-10-CM

## 2020-12-17 PROCEDURE — 99212 OFFICE O/P EST SF 10 MIN: CPT | Performed by: FAMILY MEDICINE

## 2020-12-17 NOTE — PROGRESS NOTES
"Subjective   Katia Puente is a 50 y.o. female.     CC: Management of Abnormal CT    History of Present Illness     Pt comes in today reporting that a recent CT of her back by her back surgeon showed a ? Lesion around the left adrenal gland and she's scheduled for a more in depth look CT scan tomorrow. Pt w/o sx.      The following portions of the patient's history were reviewed and updated as appropriate: allergies, current medications, past family history, past medical history, past social history, past surgical history and problem list.    Review of Systems   Constitutional: Negative for activity change, chills and fever.   Respiratory: Negative for cough.    Cardiovascular: Negative for chest pain.   Psychiatric/Behavioral: Negative for dysphoric mood.       /93   Pulse 76   Temp 97.4 °F (36.3 °C) (Oral)   Resp 16   Ht 157.5 cm (62\")   Wt 54.9 kg (121 lb)   BMI 22.13 kg/m²     Objective   Physical Exam  Constitutional:       General: She is not in acute distress.     Appearance: She is well-developed.   Pulmonary:      Effort: Pulmonary effort is normal.   Neurological:      Mental Status: She is alert and oriented to person, place, and time.   Psychiatric:         Behavior: Behavior normal.         Thought Content: Thought content normal.     CT report reviewed showing the ? Lesion.    Assessment/Plan   Diagnoses and all orders for this visit:    1. Abnormal CT of the abdomen (Primary)    Will await the f/u CT to see what needs to be done (if anything).           "

## 2020-12-22 ENCOUNTER — TELEPHONE (OUTPATIENT)
Dept: FAMILY MEDICINE CLINIC | Facility: CLINIC | Age: 50
End: 2020-12-22

## 2020-12-22 DIAGNOSIS — I72.8 SPLENIC ARTERY ANEURYSM (HCC): Primary | ICD-10-CM

## 2021-01-04 ENCOUNTER — OFFICE VISIT (OUTPATIENT)
Dept: OBSTETRICS AND GYNECOLOGY | Age: 51
End: 2021-01-04

## 2021-01-04 VITALS
HEIGHT: 62 IN | BODY MASS INDEX: 22.6 KG/M2 | WEIGHT: 122.8 LBS | DIASTOLIC BLOOD PRESSURE: 68 MMHG | SYSTOLIC BLOOD PRESSURE: 122 MMHG

## 2021-01-04 DIAGNOSIS — Z09 POSTOP CHECK: Primary | ICD-10-CM

## 2021-01-04 PROCEDURE — 99024 POSTOP FOLLOW-UP VISIT: CPT | Performed by: OBSTETRICS & GYNECOLOGY

## 2021-01-04 RX ORDER — LIRAGLUTIDE 6 MG/ML
INJECTION SUBCUTANEOUS
COMMUNITY
Start: 2020-12-21 | End: 2021-06-16

## 2021-01-04 RX ORDER — PEN NEEDLE, DIABETIC 32GX 5/32"
NEEDLE, DISPOSABLE MISCELLANEOUS SEE ADMIN INSTRUCTIONS
COMMUNITY
Start: 2020-12-22 | End: 2021-06-16

## 2021-01-07 ENCOUNTER — TELEPHONE (OUTPATIENT)
Dept: OBSTETRICS AND GYNECOLOGY | Age: 51
End: 2021-01-07

## 2021-01-07 NOTE — TELEPHONE ENCOUNTER
Link pt    Pt called stating that she had surgery 5 weeks ago for posterior repair.. Pt stated that she started back bleeding yesterday mostly spotting. The blood is bright red. Pt stated that she had her gyn f/u this past Monday. Pt stated that she has not had intercourse . Please advise     [phone]

## 2021-01-08 ENCOUNTER — OFFICE VISIT (OUTPATIENT)
Dept: OBSTETRICS AND GYNECOLOGY | Age: 51
End: 2021-01-08

## 2021-01-08 VITALS
DIASTOLIC BLOOD PRESSURE: 76 MMHG | SYSTOLIC BLOOD PRESSURE: 122 MMHG | WEIGHT: 122 LBS | BODY MASS INDEX: 22.45 KG/M2 | HEIGHT: 62 IN

## 2021-01-08 DIAGNOSIS — Z98.890 POST-OPERATIVE STATE: Primary | ICD-10-CM

## 2021-01-08 PROCEDURE — 99024 POSTOP FOLLOW-UP VISIT: CPT | Performed by: NURSE PRACTITIONER

## 2021-01-08 NOTE — PROGRESS NOTES
"Subjective   Katia Puente is a 50 y.o. female is being seen today for   Chief Complaint   Patient presents with   • Gynecologic Exam     c/o had posterior repair 5 weeks ago and is having bleeding   .    History of Present Illness     Patient of Dr Shaw  She called yesterday with some spotting.  She had a posterior repair 5 weeks ago  She was seen for her postop visit on Monday but was not having bleeding at that time  Yesterday she noticed some spotting and small amount today as well  No increase in pain or change in vaginal discharge  No fever    The following portions of the patient's history were reviewed and updated as appropriate: allergies, current medications, past family history, past medical history, past social history, past surgical history and problem list.    /76   Ht 157.5 cm (62\")   Wt 55.3 kg (122 lb)   LMP  (LMP Unknown)   BMI 22.31 kg/m²         Review of Systems   Constitutional: Negative.    HENT: Negative.    Eyes: Negative.    Respiratory: Negative.    Cardiovascular: Negative.    Gastrointestinal: Negative.    Endocrine: Negative.    Genitourinary: Positive for vaginal bleeding.   Musculoskeletal: Negative.    Skin: Negative.    Allergic/Immunologic: Negative.    Neurological: Negative.    Hematological: Negative.    Psychiatric/Behavioral: Negative.        Objective   Physical Exam  Pulmonary:      Effort: Pulmonary effort is normal.   Abdominal:      General: Abdomen is flat.   Genitourinary:     General: Normal vulva.      Rectum: Normal.      Comments: A few visible sutures present  No bleeding or abnormal discharge  One finger inserted into the vagina and sutures are present but intact  No blood on glove  Neurological:      Mental Status: She is alert.           Assessment/Plan   Diagnoses and all orders for this visit:    1. Post-operative state (Primary)      Patient's bleeding has subsided and exam is appropriate.  Bleeding is likely related to sutures resolving and " normal healing.  We discussed continued pelvic rest for now and call with any concerns.

## 2021-02-16 NOTE — PROGRESS NOTES
Christa Puente is a 50 y.o. female.     CC: Groin Discomfort    History of Present Illness     Pt who had a posterior vaginal repair 10 weeks ago comes in today reporting a 2 day h/o a slight, tender swelling in the right groin region. No f/c. Heat and Tylenol helps somewhat.       The following portions of the patient's history were reviewed and updated as appropriate: allergies, current medications, past family history, past medical history, past social history, past surgical history and problem list.    Review of Systems   Constitutional: Negative for activity change, chills and fever.   Respiratory: Negative for cough.    Cardiovascular: Negative for chest pain.   Genitourinary:        Groin discomfort   Psychiatric/Behavioral: Negative for dysphoric mood.       Objective   Physical Exam  Constitutional:       General: She is not in acute distress.     Appearance: She is well-developed.   Pulmonary:      Effort: Pulmonary effort is normal.   Skin:     Comments: Small mobile LAD right groin   Neurological:      Mental Status: She is alert and oriented to person, place, and time.   Psychiatric:         Behavior: Behavior normal.         Thought Content: Thought content normal.     Luisa present for exam.    Assessment/Plan   Diagnoses and all orders for this visit:    1. LAD (lymphadenopathy) (Primary)  -     cephalexin (Keflex) 500 MG capsule; Take 1 capsule by mouth 2 (Two) Times a Day for 7 days.  Dispense: 14 capsule; Refill: 0    Other orders  -     fluconazole (Diflucan) 150 MG tablet; Take 1 tablet by mouth 1 (One) Time for 1 dose.  Dispense: 1 tablet; Refill: 0

## 2021-02-17 ENCOUNTER — OFFICE VISIT (OUTPATIENT)
Dept: FAMILY MEDICINE CLINIC | Facility: CLINIC | Age: 51
End: 2021-02-17

## 2021-02-17 VITALS
DIASTOLIC BLOOD PRESSURE: 90 MMHG | HEIGHT: 62 IN | HEART RATE: 96 BPM | WEIGHT: 120 LBS | BODY MASS INDEX: 22.08 KG/M2 | SYSTOLIC BLOOD PRESSURE: 127 MMHG | RESPIRATION RATE: 18 BRPM | TEMPERATURE: 97.2 F

## 2021-02-17 DIAGNOSIS — R59.1 LAD (LYMPHADENOPATHY): Primary | ICD-10-CM

## 2021-02-17 PROCEDURE — 99213 OFFICE O/P EST LOW 20 MIN: CPT | Performed by: FAMILY MEDICINE

## 2021-02-17 RX ORDER — FLUCONAZOLE 150 MG/1
150 TABLET ORAL ONCE
Qty: 1 TABLET | Refills: 0 | Status: SHIPPED | OUTPATIENT
Start: 2021-02-17 | End: 2021-02-17

## 2021-02-17 RX ORDER — CEPHALEXIN 500 MG/1
500 CAPSULE ORAL 2 TIMES DAILY
Qty: 14 CAPSULE | Refills: 0 | Status: SHIPPED | OUTPATIENT
Start: 2021-02-17 | End: 2021-02-24

## 2021-03-30 ENCOUNTER — BULK ORDERING (OUTPATIENT)
Dept: CASE MANAGEMENT | Facility: OTHER | Age: 51
End: 2021-03-30

## 2021-03-30 DIAGNOSIS — Z23 IMMUNIZATION DUE: ICD-10-CM

## 2021-05-14 ENCOUNTER — TELEPHONE (OUTPATIENT)
Dept: OBSTETRICS AND GYNECOLOGY | Age: 51
End: 2021-05-14

## 2021-06-16 ENCOUNTER — APPOINTMENT (OUTPATIENT)
Dept: WOMENS IMAGING | Facility: HOSPITAL | Age: 51
End: 2021-06-16

## 2021-06-16 ENCOUNTER — PROCEDURE VISIT (OUTPATIENT)
Dept: OBSTETRICS AND GYNECOLOGY | Age: 51
End: 2021-06-16

## 2021-06-16 ENCOUNTER — OFFICE VISIT (OUTPATIENT)
Dept: OBSTETRICS AND GYNECOLOGY | Age: 51
End: 2021-06-16

## 2021-06-16 VITALS
DIASTOLIC BLOOD PRESSURE: 70 MMHG | BODY MASS INDEX: 22.26 KG/M2 | SYSTOLIC BLOOD PRESSURE: 110 MMHG | WEIGHT: 121 LBS | HEIGHT: 62 IN

## 2021-06-16 DIAGNOSIS — Z90.710 HISTORY OF TOTAL VAGINAL HYSTERECTOMY (TVH): ICD-10-CM

## 2021-06-16 DIAGNOSIS — Z78.0 OSTEOPENIA AFTER MENOPAUSE: ICD-10-CM

## 2021-06-16 DIAGNOSIS — Z79.890 POSTMENOPAUSAL HRT (HORMONE REPLACEMENT THERAPY): ICD-10-CM

## 2021-06-16 DIAGNOSIS — M85.80 OSTEOPENIA AFTER MENOPAUSE: ICD-10-CM

## 2021-06-16 DIAGNOSIS — Z12.31 VISIT FOR SCREENING MAMMOGRAM: Primary | ICD-10-CM

## 2021-06-16 DIAGNOSIS — Z01.419 WELL WOMAN EXAM WITH ROUTINE GYNECOLOGICAL EXAM: Primary | ICD-10-CM

## 2021-06-16 DIAGNOSIS — E03.9 HYPOTHYROIDISM, UNSPECIFIED TYPE: ICD-10-CM

## 2021-06-16 PROCEDURE — 77063 BREAST TOMOSYNTHESIS BI: CPT | Performed by: NURSE PRACTITIONER

## 2021-06-16 PROCEDURE — 77067 SCR MAMMO BI INCL CAD: CPT | Performed by: RADIOLOGY

## 2021-06-16 PROCEDURE — 99396 PREV VISIT EST AGE 40-64: CPT | Performed by: NURSE PRACTITIONER

## 2021-06-16 PROCEDURE — 77063 BREAST TOMOSYNTHESIS BI: CPT | Performed by: RADIOLOGY

## 2021-06-16 PROCEDURE — 77067 SCR MAMMO BI INCL CAD: CPT | Performed by: NURSE PRACTITIONER

## 2021-06-16 RX ORDER — CLOTRIMAZOLE AND BETAMETHASONE DIPROPIONATE 10; .64 MG/G; MG/G
CREAM TOPICAL 2 TIMES DAILY
Qty: 15 G | Refills: 0 | Status: SHIPPED | OUTPATIENT
Start: 2021-06-16

## 2021-06-16 NOTE — PROGRESS NOTES
Subjective     Chief Complaint   Patient presents with   • Gynecologic Exam     AE, last pap 2017neg/ hpv neg, dexa 2019, mg 2019, csy 01/15/2013       History of Present Illness    Katia Puente is a 50 y.o.  who presents for annual exam.    Doing well  Mammogram today  Pap not indicated  Dr. Kaur recently did rectocele repair - pt doing well   S/p hysterectomy for prolapse - still has ovaries. Having some HF  Dr. Yusuf does her HRT - she uses creams  She occl has some external vaginal itching/irritation that she will use Lotrisone cream for and requesting a refill  She has no other concerns or complaints   Pt of Dr. Kaur  Obstetric History:  OB History        5    Para   2    Term   2            AB   3    Living           SAB   3    TAB        Ectopic        Molar        Multiple        Live Births                   Menstrual History:     No LMP recorded (lmp unknown). Patient has had a hysterectomy.         Current contraception: status post hysterectomy  History of abnormal Pap smear: no  Received Gardasil immunization: no  Perform regular self breast exam: yes - regualrly  Family history of uterine or ovarian cancer: no  Family History of colon cancer: no  Family history of breast cancer: no    Mammogram: done today.  Colonoscopy: up to date.  DEXA: up to date.    Exercise: very active  Calcium/Vitamin D: uses supplements    The following portions of the patient's history were reviewed and updated as appropriate: allergies, current medications, past family history, past medical history, past social history, past surgical history and problem list.    Review of Systems   Constitutional: Negative.    Respiratory: Negative.    Cardiovascular: Negative.    Gastrointestinal: Negative.    Genitourinary: Negative.    Skin: Negative.    Psychiatric/Behavioral: Negative.            Objective   Physical Exam  Constitutional:       General: She is awake.      Appearance: Normal  appearance. She is well-developed.   HENT:      Head: Normocephalic and atraumatic.      Nose: Nose normal.   Neck:      Thyroid: No thyroid mass, thyromegaly or thyroid tenderness.   Cardiovascular:      Rate and Rhythm: Normal rate and regular rhythm.      Pulses: Normal pulses.      Heart sounds: Normal heart sounds.   Pulmonary:      Effort: Pulmonary effort is normal.      Breath sounds: Normal breath sounds.   Chest:      Breasts: Breasts are symmetrical.         Right: Normal. No swelling, bleeding, inverted nipple, mass, nipple discharge, skin change or tenderness.         Left: Normal. No swelling, bleeding, inverted nipple, mass, nipple discharge, skin change or tenderness.   Abdominal:      General: Abdomen is flat. Bowel sounds are normal.      Palpations: Abdomen is soft.      Tenderness: There is no abdominal tenderness.   Genitourinary:     General: Normal vulva.      Labia:         Right: No rash, tenderness, lesion or injury.         Left: No rash, tenderness, lesion or injury.       Urethra: No prolapse, urethral pain, urethral swelling or urethral lesion.      Vagina: Normal. No signs of injury. No vaginal discharge, erythema, tenderness, bleeding, lesions or prolapsed vaginal walls.      Cervix: No discharge, friability, lesion, erythema or cervical bleeding.      Uterus: Normal. Not enlarged, not tender and no uterine prolapse.       Adnexa: Right adnexa normal and left adnexa normal.        Right: No mass, tenderness or fullness.          Left: No mass, tenderness or fullness.        Rectum: Normal. No mass.   Musculoskeletal:      Cervical back: Normal range of motion and neck supple.   Lymphadenopathy:      Upper Body:      Right upper body: No supraclavicular adenopathy.      Left upper body: No supraclavicular adenopathy.   Skin:     General: Skin is warm and dry.   Neurological:      General: No focal deficit present.      Mental Status: She is alert and oriented to person, place, and  "time.   Psychiatric:         Mood and Affect: Mood normal.         Behavior: Behavior normal. Behavior is cooperative.         Thought Content: Thought content normal.         Judgment: Judgment normal.         /70   Ht 157.5 cm (62\")   Wt 54.9 kg (121 lb)   LMP  (LMP Unknown)   BMI 22.13 kg/m²     Assessment/Plan   Diagnoses and all orders for this visit:    1. Well woman exam with routine gynecological exam (Primary)    2. Hypothyroidism, unspecified type    3. History of total vaginal hysterectomy (TVH)    4. Osteopenia after menopause    5. Postmenopausal HRT (hormone replacement therapy)    Other orders  -     clotrimazole-betamethasone (Lotrisone) 1-0.05 % cream; Apply  topically to the appropriate area as directed 2 (Two) Times a Day.  Dispense: 15 g; Refill: 0        All questions answered.  Breast self exam technique reviewed and patient encouraged to perform self-exam monthly.  Discussed healthy lifestyle modifications.  Recommended 30 minutes of aerobic exercise five times per week.  Discussed calcium needs to prevent osteoporosis.    -Pap not indicated  -Mammogram today  -Dexa and c-scope up to date  -F/u 1 year for annual, mammo and repeat dexa                "

## 2021-11-01 ENCOUNTER — PREP FOR SURGERY (OUTPATIENT)
Dept: SURGERY | Facility: SURGERY CENTER | Age: 51
End: 2021-11-01

## 2021-11-01 ENCOUNTER — OFFICE VISIT (OUTPATIENT)
Dept: GASTROENTEROLOGY | Facility: CLINIC | Age: 51
End: 2021-11-01

## 2021-11-01 VITALS
HEIGHT: 62 IN | OXYGEN SATURATION: 92 % | HEART RATE: 76 BPM | TEMPERATURE: 98 F | DIASTOLIC BLOOD PRESSURE: 84 MMHG | SYSTOLIC BLOOD PRESSURE: 124 MMHG | BODY MASS INDEX: 22.63 KG/M2 | WEIGHT: 123 LBS

## 2021-11-01 DIAGNOSIS — R43.8 BAD TASTE IN MOUTH: ICD-10-CM

## 2021-11-01 DIAGNOSIS — R12 HEARTBURN: ICD-10-CM

## 2021-11-01 DIAGNOSIS — R10.13 EPIGASTRIC PAIN: Primary | ICD-10-CM

## 2021-11-01 PROCEDURE — 99214 OFFICE O/P EST MOD 30 MIN: CPT | Performed by: NURSE PRACTITIONER

## 2021-11-01 RX ORDER — PANTOPRAZOLE SODIUM 40 MG/1
TABLET, DELAYED RELEASE ORAL
COMMUNITY
Start: 2021-10-21

## 2021-11-01 RX ORDER — SODIUM CHLORIDE 0.9 % (FLUSH) 0.9 %
3 SYRINGE (ML) INJECTION EVERY 12 HOURS SCHEDULED
Status: CANCELLED | OUTPATIENT
Start: 2021-11-01

## 2021-11-01 RX ORDER — SODIUM CHLORIDE, SODIUM LACTATE, POTASSIUM CHLORIDE, CALCIUM CHLORIDE 600; 310; 30; 20 MG/100ML; MG/100ML; MG/100ML; MG/100ML
30 INJECTION, SOLUTION INTRAVENOUS CONTINUOUS PRN
Status: CANCELLED | OUTPATIENT
Start: 2021-11-01

## 2021-11-01 RX ORDER — LEVOTHYROXINE AND LIOTHYRONINE 57; 13.5 UG/1; UG/1
90 TABLET ORAL DAILY
COMMUNITY

## 2021-11-01 RX ORDER — SODIUM CHLORIDE 0.9 % (FLUSH) 0.9 %
10 SYRINGE (ML) INJECTION AS NEEDED
Status: CANCELLED | OUTPATIENT
Start: 2021-11-01

## 2021-11-01 NOTE — PATIENT INSTRUCTIONS
Continue pantoprazole 40 mg once daily.  Change the timing of pantoprazole taking this later in the day when symptoms are more pronounced as we discussed.    Start Pepcid 20 mg over-the-counter.  This can be purchased over-the-counter, generic, famotidine is fine.  Take this in the morning.    For GERD, follow antireflux precautions.  Recommend avoiding eating within 3 to 4 hours of bedtime.  Avoid foods that can trigger symptoms which may include citrus fruits, spicy foods, tomatoes and red sauces, chocolate, coffee/tea, caffeinated or carbonated beverages, alcohol.    Schedule EGD for further evaluation of symptoms.    Schedule follow-up visit 2 weeks after EGD has been performed to review results and discuss medication regimen/EGD findings.    Colon cancer screening, up-to-date, due for surveillance colonoscopy 2023.   Peng Advancement Flap Text: The defect edges were debeveled with a #15 scalpel blade.  Given the location of the defect, shape of the defect and the proximity to free margins a Peng advancement flap was deemed most appropriate.  Using a sterile surgical marker, an appropriate advancement flap was drawn incorporating the defect and placing the expected incisions within the relaxed skin tension lines where possible. The area thus outlined was incised deep to adipose tissue with a #15 scalpel blade.  The skin margins were undermined to an appropriate distance in all directions utilizing iris scissors.

## 2021-11-01 NOTE — H&P (VIEW-ONLY)
"No chief complaint on file.          History of Present Illness  51-year-old female presents today for consult regarding epigastric abdominal pain. Symptoms started approximately August 2021 and are intermittent. She went to the emergency department 9/22/2021 f due to the symptoms. CT scan and blood work and H. pylori stool test were obtained and unremarkable.    She has symptoms of gurgling, bloating and epigastric abdominal pain that can limit oral intake. No specific food triggers. She also has a burning sensation in her throat and chest. Symptoms are present daily and worse in the evening and later in the day.     She denies pain or trouble with swallowing.    Also bad taste in her mouth that can be worse when she does not.     Years ago was told that she had ulcers after taking naproxen with suspected burn in her esophagus.    She has had some weight loss associated to symptoms, less than 5 pounds.    No previous EGD    Occassional (1 x month) NSAID use.     She was started on pantoprazole after ER visit.    Previous colonoscopy 1/15/2013 recommended for repeat colonoscopy in 10 years, January 2023.    Result Review :       SCANNED - IMAGING (09/22/2021)   LABORATORY - SCAN - HELICOBACTER PYLORI, [facility], 09/23/2021 (09/23/2021)         Vital Signs:   /84   Pulse 76   Temp 98 °F (36.7 °C)   Ht 157.5 cm (62\")   Wt 55.8 kg (123 lb)   SpO2 92%   BMI 22.50 kg/m²     Body mass index is 22.5 kg/m².     Physical Exam  Vitals reviewed.   Constitutional:       General: She is not in acute distress.     Appearance: Normal appearance. She is normal weight. She is not ill-appearing.   Abdominal:      General: Bowel sounds are normal. There is no distension.      Palpations: Abdomen is soft. Abdomen is not rigid. There is no mass or pulsatile mass.      Tenderness: There is abdominal tenderness. There is no guarding or rebound.      Comments: Epigastric and upper abdominal tenderness with light palpation "   Neurological:      Mental Status: She is alert.           Assessment and Plan    Diagnoses and all orders for this visit:    1. Epigastric pain (Primary)    2. Heartburn    3. Bad taste in mouth       New onset of symptoms with abdominal pain, heartburn, dyspeptic symptoms and bad taste in her mouth.  Reviewed recent ER evaluation including CT scan, H. pylori stool test and blood work.    Suspicion for acid reflux related changes.  Will continue pantoprazole 40 mg once daily, recommend changing timing to when symptoms are more pronounced later in the day and evening.  Will add Pepcid 20 mg over-the-counter in the morning. We will proceed with EGD for evaluation of symptoms.  Recommend GERD diet and follow-up 2 weeks after EGD has been performed to review results.  Patient verbalized agreement and understanding with the above plan, all questions answered and support provided.          Patient Instructions   Continue pantoprazole 40 mg once daily.  Change the timing of pantoprazole taking this later in the day when symptoms are more pronounced as we discussed.    Start Pepcid 20 mg over-the-counter.  This can be purchased over-the-counter, generic, famotidine is fine.  Take this in the morning.    For GERD, follow antireflux precautions.  Recommend avoiding eating within 3 to 4 hours of bedtime.  Avoid foods that can trigger symptoms which may include citrus fruits, spicy foods, tomatoes and red sauces, chocolate, coffee/tea, caffeinated or carbonated beverages, alcohol.    Schedule EGD for further evaluation of symptoms.    Schedule follow-up visit 2 weeks after EGD has been performed to review results and discuss medication regimen/EGD findings.    Colon cancer screening, up-to-date, due for surveillance colonoscopy 2023.          EMR Dragon/Transcription Disclaimer:  This document has been Dictated utilizing Dragon dictation.

## 2021-11-01 NOTE — PROGRESS NOTES
"No chief complaint on file.          History of Present Illness  51-year-old female presents today for consult regarding epigastric abdominal pain. Symptoms started approximately August 2021 and are intermittent. She went to the emergency department 9/22/2021 f due to the symptoms. CT scan and blood work and H. pylori stool test were obtained and unremarkable.    She has symptoms of gurgling, bloating and epigastric abdominal pain that can limit oral intake. No specific food triggers. She also has a burning sensation in her throat and chest. Symptoms are present daily and worse in the evening and later in the day.     She denies pain or trouble with swallowing.    Also bad taste in her mouth that can be worse when she does not.     Years ago was told that she had ulcers after taking naproxen with suspected burn in her esophagus.    She has had some weight loss associated to symptoms, less than 5 pounds.    No previous EGD    Occassional (1 x month) NSAID use.     She was started on pantoprazole after ER visit.    Previous colonoscopy 1/15/2013 recommended for repeat colonoscopy in 10 years, January 2023.    Result Review :       SCANNED - IMAGING (09/22/2021)   LABORATORY - SCAN - HELICOBACTER PYLORI, [facility], 09/23/2021 (09/23/2021)         Vital Signs:   /84   Pulse 76   Temp 98 °F (36.7 °C)   Ht 157.5 cm (62\")   Wt 55.8 kg (123 lb)   SpO2 92%   BMI 22.50 kg/m²     Body mass index is 22.5 kg/m².     Physical Exam  Vitals reviewed.   Constitutional:       General: She is not in acute distress.     Appearance: Normal appearance. She is normal weight. She is not ill-appearing.   Abdominal:      General: Bowel sounds are normal. There is no distension.      Palpations: Abdomen is soft. Abdomen is not rigid. There is no mass or pulsatile mass.      Tenderness: There is abdominal tenderness. There is no guarding or rebound.      Comments: Epigastric and upper abdominal tenderness with light palpation "   Neurological:      Mental Status: She is alert.           Assessment and Plan    Diagnoses and all orders for this visit:    1. Epigastric pain (Primary)    2. Heartburn    3. Bad taste in mouth       New onset of symptoms with abdominal pain, heartburn, dyspeptic symptoms and bad taste in her mouth.  Reviewed recent ER evaluation including CT scan, H. pylori stool test and blood work.    Suspicion for acid reflux related changes.  Will continue pantoprazole 40 mg once daily, recommend changing timing to when symptoms are more pronounced later in the day and evening.  Will add Pepcid 20 mg over-the-counter in the morning. We will proceed with EGD for evaluation of symptoms.  Recommend GERD diet and follow-up 2 weeks after EGD has been performed to review results.  Patient verbalized agreement and understanding with the above plan, all questions answered and support provided.          Patient Instructions   Continue pantoprazole 40 mg once daily.  Change the timing of pantoprazole taking this later in the day when symptoms are more pronounced as we discussed.    Start Pepcid 20 mg over-the-counter.  This can be purchased over-the-counter, generic, famotidine is fine.  Take this in the morning.    For GERD, follow antireflux precautions.  Recommend avoiding eating within 3 to 4 hours of bedtime.  Avoid foods that can trigger symptoms which may include citrus fruits, spicy foods, tomatoes and red sauces, chocolate, coffee/tea, caffeinated or carbonated beverages, alcohol.    Schedule EGD for further evaluation of symptoms.    Schedule follow-up visit 2 weeks after EGD has been performed to review results and discuss medication regimen/EGD findings.    Colon cancer screening, up-to-date, due for surveillance colonoscopy 2023.          EMR Dragon/Transcription Disclaimer:  This document has been Dictated utilizing Dragon dictation.

## 2021-11-08 PROBLEM — R43.8 BAD TASTE IN MOUTH: Status: ACTIVE | Noted: 2021-11-08

## 2021-11-08 PROBLEM — R10.13 EPIGASTRIC PAIN: Status: ACTIVE | Noted: 2021-11-08

## 2021-11-08 PROBLEM — R12 HEARTBURN: Status: ACTIVE | Noted: 2021-11-08

## 2021-11-09 ENCOUNTER — LAB (OUTPATIENT)
Dept: LAB | Facility: SURGERY CENTER | Age: 51
End: 2021-11-09

## 2021-11-09 DIAGNOSIS — R12 HEARTBURN: ICD-10-CM

## 2021-11-09 DIAGNOSIS — R10.13 EPIGASTRIC PAIN: ICD-10-CM

## 2021-11-09 DIAGNOSIS — R43.8 BAD TASTE IN MOUTH: ICD-10-CM

## 2021-11-09 LAB — SARS-COV-2 ORF1AB RESP QL NAA+PROBE: NOT DETECTED

## 2021-11-09 PROCEDURE — U0004 COV-19 TEST NON-CDC HGH THRU: HCPCS | Performed by: NURSE PRACTITIONER

## 2021-11-09 PROCEDURE — C9803 HOPD COVID-19 SPEC COLLECT: HCPCS

## 2021-11-11 ENCOUNTER — HOSPITAL ENCOUNTER (OUTPATIENT)
Facility: SURGERY CENTER | Age: 51
Setting detail: HOSPITAL OUTPATIENT SURGERY
Discharge: HOME | End: 2021-11-11
Attending: INTERNAL MEDICINE | Admitting: INTERNAL MEDICINE

## 2021-11-11 ENCOUNTER — ANESTHESIA (OUTPATIENT)
Dept: SURGERY | Facility: SURGERY CENTER | Age: 51
End: 2021-11-11

## 2021-11-11 ENCOUNTER — ANESTHESIA EVENT (OUTPATIENT)
Dept: SURGERY | Facility: SURGERY CENTER | Age: 51
End: 2021-11-11

## 2021-11-11 VITALS
RESPIRATION RATE: 16 BRPM | HEIGHT: 62 IN | HEART RATE: 67 BPM | TEMPERATURE: 98 F | SYSTOLIC BLOOD PRESSURE: 134 MMHG | OXYGEN SATURATION: 99 % | DIASTOLIC BLOOD PRESSURE: 81 MMHG | BODY MASS INDEX: 22.52 KG/M2 | WEIGHT: 122.4 LBS

## 2021-11-11 DIAGNOSIS — R43.8 BAD TASTE IN MOUTH: ICD-10-CM

## 2021-11-11 DIAGNOSIS — R10.13 EPIGASTRIC PAIN: ICD-10-CM

## 2021-11-11 DIAGNOSIS — R12 HEARTBURN: ICD-10-CM

## 2021-11-11 PROCEDURE — 88305 TISSUE EXAM BY PATHOLOGIST: CPT | Performed by: INTERNAL MEDICINE

## 2021-11-11 PROCEDURE — 43239 EGD BIOPSY SINGLE/MULTIPLE: CPT | Performed by: INTERNAL MEDICINE

## 2021-11-11 PROCEDURE — 25010000002 PROPOFOL 10 MG/ML EMULSION: Performed by: ANESTHESIOLOGY

## 2021-11-11 PROCEDURE — 0DB98ZZ EXCISION OF DUODENUM, VIA NATURAL OR ARTIFICIAL OPENING ENDOSCOPIC: ICD-10-PCS | Performed by: NURSE PRACTITIONER

## 2021-11-11 RX ORDER — LIDOCAINE HYDROCHLORIDE 10 MG/ML
0.5 INJECTION, SOLUTION INFILTRATION; PERINEURAL ONCE AS NEEDED
Status: DISCONTINUED | OUTPATIENT
Start: 2021-11-11 | End: 2021-11-11 | Stop reason: HOSPADM

## 2021-11-11 RX ORDER — LIDOCAINE HYDROCHLORIDE 20 MG/ML
INJECTION, SOLUTION INFILTRATION; PERINEURAL AS NEEDED
Status: DISCONTINUED | OUTPATIENT
Start: 2021-11-11 | End: 2021-11-11 | Stop reason: SURG

## 2021-11-11 RX ORDER — ONDANSETRON 2 MG/ML
4 INJECTION INTRAMUSCULAR; INTRAVENOUS ONCE AS NEEDED
Status: DISCONTINUED | OUTPATIENT
Start: 2021-11-11 | End: 2021-11-11 | Stop reason: HOSPADM

## 2021-11-11 RX ORDER — SODIUM CHLORIDE, SODIUM LACTATE, POTASSIUM CHLORIDE, CALCIUM CHLORIDE 600; 310; 30; 20 MG/100ML; MG/100ML; MG/100ML; MG/100ML
30 INJECTION, SOLUTION INTRAVENOUS CONTINUOUS PRN
Status: DISCONTINUED | OUTPATIENT
Start: 2021-11-11 | End: 2021-11-11 | Stop reason: HOSPADM

## 2021-11-11 RX ORDER — SODIUM CHLORIDE, SODIUM LACTATE, POTASSIUM CHLORIDE, CALCIUM CHLORIDE 600; 310; 30; 20 MG/100ML; MG/100ML; MG/100ML; MG/100ML
1000 INJECTION, SOLUTION INTRAVENOUS CONTINUOUS
Status: DISCONTINUED | OUTPATIENT
Start: 2021-11-11 | End: 2021-11-11 | Stop reason: HOSPADM

## 2021-11-11 RX ORDER — SODIUM CHLORIDE 0.9 % (FLUSH) 0.9 %
3 SYRINGE (ML) INJECTION EVERY 12 HOURS SCHEDULED
Status: DISCONTINUED | OUTPATIENT
Start: 2021-11-11 | End: 2021-11-11 | Stop reason: HOSPADM

## 2021-11-11 RX ORDER — PROPOFOL 10 MG/ML
VIAL (ML) INTRAVENOUS AS NEEDED
Status: DISCONTINUED | OUTPATIENT
Start: 2021-11-11 | End: 2021-11-11 | Stop reason: SURG

## 2021-11-11 RX ORDER — PROPOFOL 10 MG/ML
VIAL (ML) INTRAVENOUS CONTINUOUS PRN
Status: DISCONTINUED | OUTPATIENT
Start: 2021-11-11 | End: 2021-11-11 | Stop reason: SURG

## 2021-11-11 RX ORDER — SODIUM CHLORIDE 0.9 % (FLUSH) 0.9 %
10 SYRINGE (ML) INJECTION AS NEEDED
Status: DISCONTINUED | OUTPATIENT
Start: 2021-11-11 | End: 2021-11-11 | Stop reason: HOSPADM

## 2021-11-11 RX ADMIN — PROPOFOL 100 MG: 10 INJECTION, EMULSION INTRAVENOUS at 09:15

## 2021-11-11 RX ADMIN — SODIUM CHLORIDE, POTASSIUM CHLORIDE, SODIUM LACTATE AND CALCIUM CHLORIDE 1000 ML: 600; 310; 30; 20 INJECTION, SOLUTION INTRAVENOUS at 08:09

## 2021-11-11 RX ADMIN — Medication 200 MCG/KG/MIN: at 09:15

## 2021-11-11 RX ADMIN — LIDOCAINE HYDROCHLORIDE 40 MG: 20 INJECTION, SOLUTION INFILTRATION; PERINEURAL at 09:14

## 2021-11-11 NOTE — ANESTHESIA PREPROCEDURE EVALUATION
Anesthesia Evaluation     Patient summary reviewed and Nursing notes reviewed   NPO Solid Status: > 8 hours  NPO Liquid Status: > 2 hours           Airway   Mallampati: II  Dental - normal exam     Pulmonary - normal exam   Cardiovascular - normal exam    (+) valvular problems/murmurs murmur, hyperlipidemia,       Neuro/Psych  GI/Hepatic/Renal/Endo      Musculoskeletal     Abdominal    Substance History      OB/GYN          Other                      Anesthesia Plan    ASA 2     MAC       Anesthetic plan, all risks, benefits, and alternatives have been provided, discussed and informed consent has been obtained with: patient.

## 2021-11-11 NOTE — ANESTHESIA POSTPROCEDURE EVALUATION
"Patient: Katia Puente    Procedure Summary     Date: 11/11/21 Room / Location: [facility] OR [facility] / [facility]    Anesthesia Start: 0912 Anesthesia Stop: 0924    Procedure: ESOPHAGOGASTRODUODENOSCOPY WITH BIOPSIES  (N/A ) Diagnosis:       Epigastric pain      Heartburn      Bad taste in mouth      (Epigastric pain [R10.13])      (Heartburn [R12])      (Bad taste in mouth [R43.8])    Surgeons: Hemant Villanueva MD Provider: Jaymie Brandt MD    Anesthesia Type: MAC ASA Status: 2          Anesthesia Type: MAC    Vitals  Vitals Value Taken Time   /81 11/11/21 0948   Temp 36.7 °C (98 °F) 11/11/21 0929   Pulse 67 11/11/21 0948   Resp 16 11/11/21 0948   SpO2 99 % 11/11/21 0948           Post Anesthesia Care and Evaluation    Patient location during evaluation: PHASE II  Patient participation: complete - patient participated  Level of consciousness: awake  Pain management: adequate  Airway patency: patent  Anesthetic complications: No anesthetic complications    Cardiovascular status: acceptable  Respiratory status: acceptable  Hydration status: acceptable    Comments: /81 (BP Location: Left arm, Patient Position: Lying)   Pulse 67   Temp 36.7 °C (98 °F) (Infrared)   Resp 16   Ht 157.5 cm (62\")   Wt 55.5 kg (122 lb 6.4 oz)   LMP  (LMP Unknown)   SpO2 99%   BMI 22.39 kg/m²       "

## 2021-11-11 NOTE — DISCHARGE INSTRUCTIONS
Upper Endoscopy, Adult, Care After  This sheet gives you information about how to care for yourself after your procedure. Your health care provider may also give you more specific instructions. If you have problems or questions, contact your health care provider.  What can I expect after the procedure?  After the procedure, it is common to have:  · A sore throat.  · Mild stomach pain or discomfort.  · Bloating.  · Nausea.  Follow these instructions at home:       · Follow instructions from your health care provider about what to eat or drink after your procedure.  · Return to your normal activities as told by your health care provider. Ask your health care provider what activities are safe for you.  · Take over-the-counter and prescription medicines only as told by your health care provider.  · DO NOT DRIVE FOR THE REST OF TODAY if you were given a sedative during your procedure.  · Keep all follow-up visits as told by your health care provider. This is important.  Contact a health care provider if you have:  · A sore throat that lasts longer than one day.  · Trouble swallowing.  Get help right away if:  · You vomit blood or your vomit looks like coffee grounds.  · You have:  ? A fever.  ? Bloody, black, or tarry stools.  ? A severe sore throat or you cannot swallow.  ? Difficulty breathing.  ? Severe pain in your chest or abdomen.  Summary  · After the procedure, it is common to have a sore throat, mild stomach discomfort, bloating, and nausea.  · Do not drive TODAY if you were given a sedative during the procedure.  · Follow instructions from your health care provider about what to eat or drink after your procedure.  · Return to your normal activities as told by your health care provider.  This information is not intended to replace advice given to you by your health care provider. Make sure you discuss any questions you have with your health care provider.  Document Released: 06/18/2013 Document Revised:  05/20/2019 Document Reviewed: 05/20/2019  [place] [employer] Interactive Patient Education © 2019 [employer]

## 2021-11-12 LAB
LAB AP CASE REPORT: NORMAL
LAB AP CLINICAL INFORMATION: NORMAL
PATH REPORT.FINAL DX SPEC: NORMAL
PATH REPORT.GROSS SPEC: NORMAL

## 2022-08-11 ENCOUNTER — OFFICE VISIT (OUTPATIENT)
Dept: FAMILY MEDICINE CLINIC | Facility: CLINIC | Age: 52
End: 2022-08-11

## 2022-08-11 VITALS
RESPIRATION RATE: 16 BRPM | DIASTOLIC BLOOD PRESSURE: 92 MMHG | HEIGHT: 62 IN | SYSTOLIC BLOOD PRESSURE: 156 MMHG | WEIGHT: 120 LBS | BODY MASS INDEX: 22.08 KG/M2 | HEART RATE: 84 BPM | TEMPERATURE: 97.5 F

## 2022-08-11 DIAGNOSIS — F43.21 GRIEF: ICD-10-CM

## 2022-08-11 DIAGNOSIS — H18.892 CORNEAL IRRITATION OF LEFT EYE: Primary | ICD-10-CM

## 2022-08-11 PROCEDURE — 99214 OFFICE O/P EST MOD 30 MIN: CPT | Performed by: FAMILY MEDICINE

## 2022-08-11 RX ORDER — SULFACETAMIDE SODIUM 100 MG/ML
1 SOLUTION/ DROPS OPHTHALMIC 4 TIMES DAILY
Qty: 5 ML | Refills: 0 | Status: SHIPPED | OUTPATIENT
Start: 2022-08-11 | End: 2022-08-16

## 2022-08-11 RX ORDER — VORTIOXETINE 10 MG/1
10 TABLET, FILM COATED ORAL DAILY
Qty: 30 TABLET | Refills: 5 | Status: SHIPPED | OUTPATIENT
Start: 2022-08-11 | End: 2023-03-30

## 2022-08-11 NOTE — PROGRESS NOTES
"Christa Puente is a 52 y.o. female.     CC: Eye Irritation         Sadness    History of Present Illness     Pt comes in today reporting a 2 day h/o left outer eye irritation and possibly scratched it rubbing her eye. Pt has been crying a lot since the sad passing of her son due to a drug overdose. No change to vision or d/c.  Pt is going to start a support group but is asking for something else to help during this sad time. Has tried several things prior and didn't like them due to SEs.    The following portions of the patient's history were reviewed and updated as appropriate: allergies, current medications, past family history, past medical history, past social history, past surgical history and problem list.    Review of Systems   Constitutional: Negative for activity change, chills and fever.   Respiratory: Negative for cough.    Cardiovascular: Negative for chest pain.   Psychiatric/Behavioral: Positive for dysphoric mood. Negative for suicidal ideas.       /92   Pulse 84   Temp 97.5 °F (36.4 °C) (Oral)   Resp 16   Ht 157.5 cm (62\")   Wt 54.4 kg (120 lb)   LMP  (LMP Unknown)   BMI 21.95 kg/m²     Objective   Physical Exam  Constitutional:       General: She is not in acute distress.     Appearance: She is well-developed.   Eyes:      Extraocular Movements: Extraocular movements intact.      Pupils: Pupils are equal, round, and reactive to light.        Comments: Vascular injection noted; anterior chambers deep   Pulmonary:      Effort: Pulmonary effort is normal.   Neurological:      Mental Status: She is alert and oriented to person, place, and time.   Psychiatric:         Mood and Affect: Mood is depressed.         Behavior: Behavior normal.         Thought Content: Thought content normal.         Assessment & Plan   Diagnoses and all orders for this visit:    1. Corneal irritation of left eye (Primary)  -     sulfacetamide (Bleph-10) 10 % ophthalmic solution; Administer 1 drop " into the left eye 4 (Four) Times a Day for 5 days.  Dispense: 5 mL; Refill: 0    2. Grief  -     Vortioxetine HBr (Trintellix) 10 MG tablet; Take 10 mg by mouth Daily.  Dispense: 30 tablet; Refill: 5

## 2022-08-12 ENCOUNTER — PRIOR AUTHORIZATION (OUTPATIENT)
Dept: FAMILY MEDICINE CLINIC | Facility: CLINIC | Age: 52
End: 2022-08-12

## 2023-02-16 ENCOUNTER — OFFICE VISIT (OUTPATIENT)
Dept: FAMILY MEDICINE CLINIC | Facility: CLINIC | Age: 53
End: 2023-02-16
Payer: COMMERCIAL

## 2023-02-16 VITALS
HEIGHT: 62 IN | BODY MASS INDEX: 23 KG/M2 | DIASTOLIC BLOOD PRESSURE: 60 MMHG | SYSTOLIC BLOOD PRESSURE: 125 MMHG | HEART RATE: 66 BPM | WEIGHT: 125 LBS | RESPIRATION RATE: 16 BRPM | TEMPERATURE: 97.5 F | OXYGEN SATURATION: 100 %

## 2023-02-16 DIAGNOSIS — S76.012A MUSCLE STRAIN OF LEFT HIP, INITIAL ENCOUNTER: Primary | ICD-10-CM

## 2023-02-16 PROBLEM — M51.369 DEGENERATION OF LUMBAR INTERVERTEBRAL DISC: Status: ACTIVE | Noted: 2023-02-16

## 2023-02-16 PROBLEM — M41.9 SCOLIOSIS: Status: ACTIVE | Noted: 2023-02-16

## 2023-02-16 PROBLEM — M51.36 DEGENERATION OF LUMBAR INTERVERTEBRAL DISC: Status: ACTIVE | Noted: 2023-02-16

## 2023-02-16 PROBLEM — I72.8 SPLENIC ARTERY ANEURYSM (HCC): Status: ACTIVE | Noted: 2021-08-03

## 2023-02-16 PROBLEM — M54.16 LUMBAR RADICULOPATHY: Status: ACTIVE | Noted: 2023-02-16

## 2023-02-16 PROBLEM — E06.3 HASHIMOTO'S THYROIDITIS: Status: ACTIVE | Noted: 2023-02-16

## 2023-02-16 PROBLEM — T81.9XXA COMPLICATION OF SURGICAL PROCEDURE: Status: ACTIVE | Noted: 2023-02-16

## 2023-02-16 PROCEDURE — 99213 OFFICE O/P EST LOW 20 MIN: CPT

## 2023-02-16 RX ORDER — METHYLPREDNISOLONE 4 MG/1
TABLET ORAL
Qty: 21 EACH | Refills: 0 | Status: SHIPPED | OUTPATIENT
Start: 2023-02-16 | End: 2023-03-30

## 2023-02-16 RX ORDER — CYCLOBENZAPRINE HCL 5 MG
5 TABLET ORAL 3 TIMES DAILY PRN
Qty: 60 TABLET | Refills: 0 | Status: SHIPPED | OUTPATIENT
Start: 2023-02-16
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
## 2023-03-21 ENCOUNTER — TELEPHONE (OUTPATIENT)
Dept: FAMILY MEDICINE CLINIC | Facility: CLINIC | Age: 53
End: 2023-03-21
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
## 2023-03-22 NOTE — TELEPHONE ENCOUNTER
Patient told -  Let her know very few Ortho docs do Spine (Dr Sequeira is retired) so will need to be a Neurosurgeon. She and I will discuss further at her visit.

## 2023-03-30 ENCOUNTER — OFFICE VISIT (OUTPATIENT)
Dept: FAMILY MEDICINE CLINIC | Facility: CLINIC | Age: 53
End: 2023-03-30
Payer: COMMERCIAL

## 2023-03-30 VITALS
HEIGHT: 62 IN | SYSTOLIC BLOOD PRESSURE: 123 MMHG | TEMPERATURE: 97.5 F | WEIGHT: 125 LBS | HEART RATE: 96 BPM | RESPIRATION RATE: 18 BRPM | BODY MASS INDEX: 23 KG/M2 | DIASTOLIC BLOOD PRESSURE: 81 MMHG

## 2023-03-30 DIAGNOSIS — Z12.11 SCREENING FOR COLON CANCER: ICD-10-CM

## 2023-03-30 DIAGNOSIS — M51.36 DDD (DEGENERATIVE DISC DISEASE), LUMBAR: Primary | ICD-10-CM

## 2023-03-30 DIAGNOSIS — M70.62 TROCHANTERIC BURSITIS OF LEFT HIP: ICD-10-CM

## 2023-03-30 PROCEDURE — 99213 OFFICE O/P EST LOW 20 MIN: CPT | Performed by: FAMILY MEDICINE

## 2023-03-30 RX ORDER — LISDEXAMFETAMINE DIMESYLATE 10 MG/1
CAPSULE ORAL
COMMUNITY
Start: 2023-02-27

## 2023-03-30 RX ORDER — ROSUVASTATIN CALCIUM 10 MG/1
10 TABLET, COATED ORAL DAILY
COMMUNITY
Start: 2023-02-28

## 2023-03-30 RX ORDER — MELOXICAM 15 MG/1
15 TABLET ORAL DAILY
Qty: 30 TABLET | Refills: 5 | Status: SHIPPED | OUTPATIENT
Start: 2023-03-30
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
## 2023-04-04 ENCOUNTER — PATIENT ROUNDING (BHMG ONLY) (OUTPATIENT)
Dept: ORTHOPEDIC SURGERY | Facility: CLINIC | Age: 53
End: 2023-04-04
Payer: COMMERCIAL

## 2023-04-04 ENCOUNTER — OFFICE VISIT (OUTPATIENT)
Dept: ORTHOPEDIC SURGERY | Facility: CLINIC | Age: 53
End: 2023-04-04
Payer: COMMERCIAL

## 2023-04-04 VITALS — BODY MASS INDEX: 23 KG/M2 | HEIGHT: 62 IN | RESPIRATION RATE: 16 BRPM | TEMPERATURE: 97.3 F | WEIGHT: 125 LBS

## 2023-04-04 DIAGNOSIS — R52 PAIN: ICD-10-CM

## 2023-04-04 DIAGNOSIS — M76.32 ILIOTIBIAL BAND TENDINITIS OF LEFT SIDE: ICD-10-CM

## 2023-04-04 DIAGNOSIS — M70.62 GREATER TROCHANTERIC BURSITIS OF LEFT HIP: Primary | ICD-10-CM

## 2023-04-04 PROCEDURE — 99203 OFFICE O/P NEW LOW 30 MIN: CPT | Performed by: ORTHOPAEDIC SURGERY

## 2023-04-04 PROCEDURE — 73502 X-RAY EXAM HIP UNI 2-3 VIEWS: CPT | Performed by: ORTHOPAEDIC SURGERY

## 2023-04-04 PROCEDURE — 20610 DRAIN/INJ JOINT/BURSA W/O US: CPT | Performed by: ORTHOPAEDIC SURGERY

## 2023-04-04 RX ADMIN — METHYLPREDNISOLONE ACETATE 80 MG: 80 INJECTION, SUSPENSION INTRA-ARTICULAR; INTRALESIONAL; INTRAMUSCULAR; SOFT TISSUE at 12:56

## 2023-04-04 RX ADMIN — LIDOCAINE HYDROCHLORIDE 2 ML: 10 INJECTION, SOLUTION EPIDURAL; INFILTRATION; INTRACAUDAL; PERINEURAL at 12:56

## 2023-04-04 NOTE — PROGRESS NOTES
General Exam    Patient: Katia Puente    YOB: 1970    Medical Record Number: [id number]    Chief Complaints: Left hip pain    History of Present Illness:     52 y.o. female patient who presents for evaluation treatment left hip pain.  Patient states symptoms really started about 2 months ago.  Pain is located laterally she feels that it pulls.  Things are little bit better once in motion.  Worse with weather.  Some groin discomfort at times but symptoms are mainly laterally based.  She did do some steroids which helped some.    Denies any numbness or tingling.  Denies any fevers, cough or shortness of breath.    Allergies:   Allergies   Allergen Reactions   • Codeine Nausea And Vomiting   • Erythromycin Nausea And Vomiting       Home Medications:      Current Outpatient Medications:   •  albuterol sulfate  (90 Base) MCG/ACT inhaler, Inhale 2 puffs Every 4 (Four) Hours As Needed for Wheezing., Disp: 6.7 g, Rfl: 0  •  ALPRAZolam (XANAX) 0.5 MG tablet, Take 0.5 mg by mouth At Night As Needed., Disp: , Rfl:   •  benzonatate (TESSALON) 100 MG capsule, Take 1-2 capsules by mouth 3 (Three) Times a Day As Needed for Cough., Disp: 30 capsule, Rfl: 0  •  clotrimazole-betamethasone (Lotrisone) 1-0.05 % cream, Apply  topically to the appropriate area as directed 2 (Two) Times a Day., Disp: 15 g, Rfl: 0  •  cyclobenzaprine (FLEXERIL) 5 MG tablet, Take 1 tablet by mouth 3 (Three) Times a Day As Needed for Muscle Spasms. Avoid with driving. Do not use alcohol with this prescription and do not take with Xanax., Disp: 60 tablet, Rfl: 0  •  docusate sodium (Colace) 100 MG capsule, Take 1 capsule by mouth 2 (Two) Times a Day., Disp: 60 capsule, Rfl: 1  •  Fish Oil-Cholecalciferol (FISH OIL + D3 PO), Take 1 tablet/day by mouth Daily. HOLDING FOR SURGERY, Disp: , Rfl:   •  IRON PO, Take 1 tablet by mouth Daily. HOLDING FOR SURGERY, Disp: , Rfl:   •  meloxicam (MOBIC) 15 MG tablet, Take 1 tablet by mouth  Daily., Disp: 30 tablet, Rfl: 5  •  multivitamin with minerals tablet tablet, Take 1 tablet by mouth Daily. HOLDING FOR SURGERY, Disp: , Rfl:   •  pantoprazole (PROTONIX) 40 MG EC tablet, , Disp: , Rfl:   •  rosuvastatin (CRESTOR) 10 MG tablet, Take 1 tablet by mouth Daily., Disp: , Rfl:   •  Thyroid 90 MG PO tablet, Take 90 mg by mouth Daily., Disp: , Rfl:   •  vitamin E 400 UNIT capsule, Take 400 Units by mouth Daily. HOLDING FOR SURGERY, Disp: , Rfl:   •  Vyvanse 10 MG capsule, TAKE 1 CAPSULE (10 MG) BY MOUTH DAILY IN THE MORNING ALONG WITH THE 50 MG, Disp: , Rfl:   •  VYVANSE 50 MG capsule, Take 50 mg by mouth Daily HOLD FOR 48HOURS PRIOR TO SURGERY , Disp: , Rfl: 0    Past Medical History:   Diagnosis Date   • Allergic    • Anxiety    • Breast pain, left    • Elevated cholesterol    • Heart murmur     AS CHILD   • Hemorrhoids 05/02/2012   • History of 2019 novel coronavirus disease (COVID-19)    • Hypothyroidism    • Rectocele 10/26/2020   • Vaginal atrophy        Past Surgical History:   Procedure Laterality Date   • ANTERIOR AND POSTERIOR VAGINAL REPAIR N/A 12/2/2020    Procedure: POSTERIOR VAGINAL REPAIR;  Surgeon: Rene Kaur MD;  Location: [facility] OR [facility] [place];  Service: Obstetrics/Gynecology;  Laterality: N/A;   • BACK SURGERY      L4, L5 discectomy   • BACK SURGERY  12/23/2019       • BREAST SURGERY     • CHOLECYSTECTOMY     • COLONOSCOPY     • ENDOSCOPY  01/2013   • ENDOSCOPY N/A 11/11/2021    Procedure: ESOPHAGOGASTRODUODENOSCOPY WITH BIOPSIES ;  Surgeon: Hemant Villanueva MD;  Location: [facility] OR;  Service: Gastroenterology;  Laterality: N/A;  NORMAL     • GALLBLADDER SURGERY  2016   • HEMORRHOIDECTOMY     • HYSTERECTOMY  2008    VAGINAL   • LASIK  1998   • TONSILLECTOMY         Social History     Occupational History   • Not on file   Tobacco Use   • Smoking status: Never   • Smokeless tobacco: Never   Vaping Use   • Vaping Use: Never used   Substance and Sexual Activity   • Alcohol use:  "Yes     Comment: current some day; occ   • Drug use: No   • Sexual activity: Not Currently     Partners: Male     Birth control/protection: Surgical      Social History     Social History Narrative   • Not on file       Family History   Problem Relation Age of Onset   • Heart disease Mother    • Irritable bowel syndrome Mother    • Heart disease Father    • Diabetes Paternal Aunt    • Stroke Maternal Grandmother    • Cancer Paternal Grandfather    • Colon cancer Paternal Grandfather    • Malig Hyperthermia Neg Hx    • Colon polyps Neg Hx    • Crohn's disease Neg Hx    • Ulcerative colitis Neg Hx        Review of Systems:      Constitutional: Denies fever, shaking or chills         All other pertinent positives and negatives as noted above in HPI.    Physical Exam: 52 y.o. female    Vitals:    04/04/23 1014   Resp: 16   Temp: 97.3 °F (36.3 °C)   TempSrc: Temporal   Weight: 56.7 kg (125 lb)   Height: 157.5 cm (62.01\")       General:  Patient is awake and alert.  Appears in no acute distress or discomfort.      Musculoskeletal/Extremities:    Left lower extremity examined positive palpation of the greater trochanter.  Hip range of motion full and painless.  Negative straight leg raise and Stinchfield.  Motor and sensory intact distally.         Radiology:       3 views left hip include AP pelvis, AP and lateral taken reviewed to evaluate the patient's complaint/s.    Imaging demonstrates minimal to mild degenerative changes with some just early bone sclerosis of the left hip.  Evidence of previous lumbar spinal surgery with instrumentation.     No imaging for comparison.    Assessment: Left hip greater trochanteric bursitis, iliotibial band tendinitis      Plan:      Discussed findings with the patient I think is more soft tissue in nature recommend conservative treatment consisting of rest, ice, activity modification, formal physical therapy, anti-inflammatories and injection.  Symptoms may take 6 to 8 weeks to " resolve and treated appropriately and can return thus the importance of the above especially therapy.           We will plan for follow up as needed.    All questions were answered.  Patient understands and agrees with the plan.    Elliot Lombardi MD    04/04/2023    CC to Rene Rivera MD            Large Joint Arthrocentesis: L greater trochanteric bursa  Date/Time: 4/4/2023 12:56 PM  Consent given by: patient  Site marked: site marked  Timeout: Immediately prior to procedure a time out was called to verify the correct patient, procedure, equipment, support staff and site/side marked as required   Supporting Documentation  Indications: pain and joint swelling   Procedure Details  Location: hip - L greater trochanteric bursa  Preparation: Patient was prepped and draped in the usual sterile fashion  Needle size: 22 G  Approach: anterolateral  Medications administered: 80 mg methylPREDNISolone acetate 80 MG/ML; 2 mL lidocaine PF 1% 1 %  Patient tolerance: patient tolerated the procedure well with no immediate complications

## 2023-04-04 NOTE — PROGRESS NOTES
"Subjective     Chief Complaint   Patient presents with   • Gynecologic Exam     AE, last pap 2018 neg, pimples around gential area       History of Present Illness    Sandip Pierre is a 26 y.o.  who presents for annual exam.    New GYN  Here for annual   In school at [employer] for [employer]  Lives at home with her parents   Also has c/o of \"pimples around genital area\"  She first noticed 2-3 months ago  Does get waxed every 4 weeks and thinks it may be related to this  No symptoms currently  Taking tri-sprintec for birth control  No hx of abnormal pap smears   Her menses are regular every 28-30 days, lasting 4-7 days, dysmenorrhea none   She sees dermatology for acne - they prescribe her antibiotics and birth control  She is not currently sexually active but has been in the past   She would like to have std screening with her pap smear today   Obstetric History:  OB History        0    Para   0    Term   0       0    AB   0    Living   0       SAB   0    IAB   0    Ectopic   0    Molar   0    Multiple   0    Live Births   0          Obstetric Comments   No children            Menstrual History:     Patient's last menstrual period was 2021.         Current contraception: OCP (estrogen/progesterone)  History of abnormal Pap smear: no  Received Gardasil immunization: yes x2 , has third once scheduled for February   Perform regular self breast exam: no  Family history of uterine or ovarian cancer: no  Family History of colon cancer: no  Family history of breast cancer: yes - mother age 45    Mammogram: not indicated.  Colonoscopy: not indicated.  DEXA: not indicated.    Exercise: moderately active  Calcium/Vitamin D: adequate intake    The following portions of the patient's history were reviewed and updated as appropriate: allergies, current medications, past family history, past medical history, past social history, past surgical history and problem list.    Review of Systems " A MyPublisher Message has been sent to the patient for PATIENT ROUNDING with [facility]     Constitutional: Negative.    Respiratory: Negative.    Cardiovascular: Negative.    Gastrointestinal: Negative.    Genitourinary: Negative.    Skin: Negative.    Psychiatric/Behavioral: Negative.            Objective   Physical Exam  Constitutional:       General: She is awake.      Appearance: Normal appearance. She is well-developed.   HENT:      Head: Normocephalic and atraumatic.      Nose: Nose normal.   Neck:      Thyroid: No thyroid mass, thyromegaly or thyroid tenderness.   Cardiovascular:      Rate and Rhythm: Normal rate and regular rhythm.      Pulses: Normal pulses.      Heart sounds: Normal heart sounds.   Pulmonary:      Effort: Pulmonary effort is normal.      Breath sounds: Normal breath sounds.   Chest:   Breasts: Breasts are symmetrical.      Right: Normal. No swelling, bleeding, inverted nipple, mass, nipple discharge, skin change, tenderness or supraclavicular adenopathy.      Left: Normal. No swelling, bleeding, inverted nipple, mass, nipple discharge, skin change, tenderness or supraclavicular adenopathy.            Comments: Two masses noted to left breast.  #1 - approx 1 inch, round mobile mass at 11 o clock 2-3 cm from nipple  #2 - Approx 1 cm, round mobile mass at 4 o clock 2-3 cm from nipple  Abdominal:      General: Abdomen is flat. Bowel sounds are normal.      Palpations: Abdomen is soft.      Tenderness: There is no abdominal tenderness.   Genitourinary:     General: Normal vulva.      Labia:         Right: No rash, tenderness, lesion or injury.         Left: No rash, tenderness, lesion or injury.       Urethra: No prolapse, urethral pain, urethral swelling or urethral lesion.      Vagina: Normal. No signs of injury. No vaginal discharge, erythema, tenderness, bleeding, lesions or prolapsed vaginal walls.      Cervix: No discharge, friability, lesion, erythema or cervical bleeding.      Uterus: Normal. Not enlarged, not tender and no uterine prolapse.       Adnexa: Right adnexa  "normal and left adnexa normal.        Right: No mass, tenderness or fullness.          Left: No mass, tenderness or fullness.        Rectum: Normal. No mass.   Musculoskeletal:      Cervical back: Normal range of motion and neck supple.   Lymphadenopathy:      Upper Body:      Right upper body: No supraclavicular adenopathy.      Left upper body: No supraclavicular adenopathy.   Skin:     General: Skin is warm and dry.   Neurological:      General: No focal deficit present.      Mental Status: She is alert and oriented to person, place, and time.   Psychiatric:         Mood and Affect: Mood normal.         Behavior: Behavior normal. Behavior is cooperative.         Thought Content: Thought content normal.         Judgment: Judgment normal.         /64   Ht 165.1 cm (65\")   Wt 64.4 kg (142 lb)   LMP 12/07/2021   BMI 23.63 kg/m²     Assessment/Plan   Diagnoses and all orders for this visit:    1. Well woman exam with routine gynecological exam (Primary)    2. Screening for cervical cancer  -     IGP, CtNg, Rfx Aptima HPV ASCU    3. Family history of breast cancer in mother    4. Screen for STD (sexually transmitted disease)  -     IGP, CtNg, Rfx Aptima HPV ASCU    5. Breast mass, left  -     US breast left complete; Future    6. Oral contraceptive pill surveillance        All questions answered.  Breast self exam technique reviewed and patient encouraged to perform self-exam monthly.  Discussed healthy lifestyle modifications.  Recommended 30 minutes of aerobic exercise five times per week.  Discussed calcium needs to prevent osteoporosis.    -Pap and std screening  -Breast imaging ordered  -Info given on myrisk - pt will discuss with mother to see if she has had genetic testing done and will return if she hasn't                 "

## 2023-04-07 RX ORDER — METHYLPREDNISOLONE ACETATE 80 MG/ML
80 INJECTION, SUSPENSION INTRA-ARTICULAR; INTRALESIONAL; INTRAMUSCULAR; SOFT TISSUE
Status: COMPLETED | OUTPATIENT
Start: 2023-04-04 | End: 2023-04-04

## 2023-04-07 RX ORDER — LIDOCAINE HYDROCHLORIDE 10 MG/ML
2 INJECTION, SOLUTION EPIDURAL; INFILTRATION; INTRACAUDAL; PERINEURAL
Status: COMPLETED | OUTPATIENT
Start: 2023-04-04 | End: 2023-04-04

## 2023-04-14 ENCOUNTER — TELEPHONE (OUTPATIENT)
Dept: FAMILY MEDICINE CLINIC | Facility: CLINIC | Age: 53
End: 2023-04-14
Payer: COMMERCIAL

## 2023-04-14 DIAGNOSIS — M51.36 DDD (DEGENERATIVE DISC DISEASE), LUMBAR: Primary | ICD-10-CM
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
## 2023-04-26 ENCOUNTER — TELEPHONE (OUTPATIENT)
Dept: FAMILY MEDICINE CLINIC | Facility: CLINIC | Age: 53
End: 2023-04-26

## 2023-04-26 NOTE — TELEPHONE ENCOUNTER
Caller: CindiBarbraKatia L    Relationship: Self    Best call back number: [phone]    What medication are you requesting: WELLBUTRIN    What are your current symptoms: DEPRESSION    Have you had these symptoms before:    [x] Yes  [] No    Have you been treated for these symptoms before:   [x] Yes  [] No    If a prescription is needed, what is your preferred pharmacy and phone number: [id number] [place], [place] - [address] AT [facility] [phone] [facility] [phone]      Additional notes: PATIENT STATED SHE WAS PREVIOUSLY PRESCRIBED TRINTELLEX FOR DEPRESSION, HOWEVER DID NOT CONTINUE TAKING AS IT HAD BAD SIDE EFFECTS FOR HER.    PATIENT IS REQUESTING TO KNOW IF SHE MAY BE PRESCRIBED TAKE WELLBUTRIN, AS SHE HAS HEAR IT MAY HAVE BETTER EFFECTS FOR HER.    PLEASE CALL TO DISCUSS IF NEEDED.

## 2023-04-27 RX ORDER — BUPROPION HYDROCHLORIDE 150 MG/1
150 TABLET ORAL DAILY
Qty: 30 TABLET | Refills: 5 | Status: SHIPPED | OUTPATIENT
Start: 2023-04-27

## 2023-05-02 DIAGNOSIS — S76.012A MUSCLE STRAIN OF LEFT HIP, INITIAL ENCOUNTER: ICD-10-CM

## 2023-05-03 RX ORDER — CYCLOBENZAPRINE HCL 5 MG
5 TABLET ORAL 3 TIMES DAILY PRN
Qty: 30 TABLET | Refills: 0 | Status: SHIPPED | OUTPATIENT
Start: 2023-05-03

## 2023-05-03 NOTE — TELEPHONE ENCOUNTER
Rx Refill Note  Requested Prescriptions     Pending Prescriptions Disp Refills   • cyclobenzaprine (FLEXERIL) 5 MG tablet [Pharmacy Med Name: CYCLOBENZAPRINE 5 MG TABLET] 60 tablet 0     Sig: TAKE 1 TABLET BY MOUTH 3 (THREE) TIMES A DAY AS NEEDED FOR MUSCLE SPASMS. AVOID WITH DRIVING. DO NOT USE ALCOHOL WITH THIS PRESCRIPTION AND DO NOT TAKE WITH XANAX.      Last office visit with prescribing clinician: 2/16/2023   Last telemedicine visit with prescribing clinician: Visit date not found   Next office visit with prescribing clinician: Visit date not found

## 2023-09-18 DIAGNOSIS — M51.36 DDD (DEGENERATIVE DISC DISEASE), LUMBAR: ICD-10-CM

## 2023-09-18 DIAGNOSIS — M70.62 TROCHANTERIC BURSITIS OF LEFT HIP: ICD-10-CM

## 2023-09-18 RX ORDER — MELOXICAM 15 MG/1
TABLET ORAL
Qty: 30 TABLET | Refills: 0 | Status: SHIPPED | OUTPATIENT
Start: 2023-09-18

## 2023-10-25 DIAGNOSIS — M51.36 DDD (DEGENERATIVE DISC DISEASE), LUMBAR: ICD-10-CM

## 2023-11-02 DIAGNOSIS — M70.62 TROCHANTERIC BURSITIS OF LEFT HIP: ICD-10-CM

## 2023-11-02 DIAGNOSIS — M51.36 DDD (DEGENERATIVE DISC DISEASE), LUMBAR: ICD-10-CM

## 2023-11-03 RX ORDER — MELOXICAM 15 MG/1
TABLET ORAL
Qty: 14 TABLET | Refills: 0 | Status: SHIPPED | OUTPATIENT
Start: 2023-11-03

## 2023-11-03 RX ORDER — BUPROPION HYDROCHLORIDE 150 MG/1
150 TABLET ORAL DAILY
Qty: 14 TABLET | Refills: 0 | Status: SHIPPED | OUTPATIENT
Start: 2023-11-03

## 2023-11-10 ENCOUNTER — HOSPITAL ENCOUNTER (OUTPATIENT)
Facility: HOSPITAL | Age: 53
Setting detail: OBSERVATION
Discharge: HOME | End: 2023-11-11
Attending: EMERGENCY MEDICINE | Admitting: INTERNAL MEDICINE
Payer: COMMERCIAL

## 2023-11-10 DIAGNOSIS — K35.80 ACUTE APPENDICITIS, UNSPECIFIED ACUTE APPENDICITIS TYPE: Primary | ICD-10-CM

## 2023-11-10 DIAGNOSIS — K37 APPENDICITIS: ICD-10-CM

## 2023-11-10 PROCEDURE — 99285 EMERGENCY DEPT VISIT HI MDM: CPT

## 2023-11-10 RX ORDER — SODIUM CHLORIDE 0.9 % (FLUSH) 0.9 %
10 SYRINGE (ML) INJECTION AS NEEDED
Status: DISCONTINUED | OUTPATIENT
Start: 2023-11-10 | End: 2023-11-11 | Stop reason: HOSPADM

## 2023-11-10 NOTE — Clinical Note
Level of Care: Med/Surg [1]   Diagnosis: Acute appendicitis [312182]   Admitting Physician: NIKKO AMAYA [[id number]]   Attending Physician: NIKKO AMAYA [[id number]]   Certification: I Certify That Inpatient Hospital Services Are Medically Necessary For Greater Than 2 Midnights

## 2023-11-11 ENCOUNTER — APPOINTMENT (OUTPATIENT)
Dept: CT IMAGING | Facility: HOSPITAL | Age: 53
End: 2023-11-11
Payer: COMMERCIAL

## 2023-11-11 ENCOUNTER — ANESTHESIA (OUTPATIENT)
Dept: PERIOP | Facility: HOSPITAL | Age: 53
End: 2023-11-11
Payer: COMMERCIAL

## 2023-11-11 ENCOUNTER — READMISSION MANAGEMENT (OUTPATIENT)
Dept: CALL CENTER | Facility: HOSPITAL | Age: 53
End: 2023-11-11
Payer: COMMERCIAL

## 2023-11-11 ENCOUNTER — ANESTHESIA EVENT (OUTPATIENT)
Dept: PERIOP | Facility: HOSPITAL | Age: 53
End: 2023-11-11
Payer: COMMERCIAL

## 2023-11-11 ENCOUNTER — NURSE TRIAGE (OUTPATIENT)
Dept: CALL CENTER | Facility: HOSPITAL | Age: 53
End: 2023-11-11
Payer: COMMERCIAL

## 2023-11-11 VITALS
SYSTOLIC BLOOD PRESSURE: 100 MMHG | WEIGHT: 122 LBS | OXYGEN SATURATION: 98 % | TEMPERATURE: 97.6 F | HEART RATE: 75 BPM | DIASTOLIC BLOOD PRESSURE: 65 MMHG | BODY MASS INDEX: 22.45 KG/M2 | RESPIRATION RATE: 18 BRPM | HEIGHT: 62 IN

## 2023-11-11 DIAGNOSIS — K35.80 ACUTE APPENDICITIS, UNSPECIFIED ACUTE APPENDICITIS TYPE: ICD-10-CM

## 2023-11-11 PROBLEM — A41.9 SEPSIS: Status: RESOLVED | Noted: 2023-11-11 | Resolved: 2023-11-11

## 2023-11-11 PROBLEM — K37 APPENDICITIS: Status: RESOLVED | Noted: 2023-11-11 | Resolved: 2023-11-11

## 2023-11-11 PROBLEM — K37 APPENDICITIS: Status: ACTIVE | Noted: 2023-11-11

## 2023-11-11 PROBLEM — R10.31 RIGHT LOWER QUADRANT ABDOMINAL PAIN: Status: ACTIVE | Noted: 2023-11-11

## 2023-11-11 PROBLEM — A41.9 SEPSIS: Status: ACTIVE | Noted: 2023-11-11

## 2023-11-11 PROBLEM — R10.31 RIGHT LOWER QUADRANT ABDOMINAL PAIN: Status: RESOLVED | Noted: 2023-11-11 | Resolved: 2023-11-11

## 2023-11-11 LAB
ALBUMIN SERPL-MCNC: 4.1 G/DL (ref 3.5–5.2)
ALBUMIN/GLOB SERPL: 2.1 G/DL
ALP SERPL-CCNC: 57 U/L (ref 39–117)
ALT SERPL W P-5'-P-CCNC: 16 U/L (ref 1–33)
ANION GAP SERPL CALCULATED.3IONS-SCNC: 10.5 MMOL/L (ref 5–15)
AST SERPL-CCNC: 12 U/L (ref 1–32)
BASOPHILS # BLD AUTO: 0.02 10*3/MM3 (ref 0–0.2)
BASOPHILS NFR BLD AUTO: 0.1 % (ref 0–1.5)
BILIRUB SERPL-MCNC: 0.5 MG/DL (ref 0–1.2)
BILIRUB UR QL STRIP: NEGATIVE
BUN SERPL-MCNC: 13 MG/DL (ref 6–20)
BUN/CREAT SERPL: 20.6 (ref 7–25)
CALCIUM SPEC-SCNC: 9.4 MG/DL (ref 8.6–10.5)
CHLORIDE SERPL-SCNC: 102 MMOL/L (ref 98–107)
CLARITY UR: ABNORMAL
CO2 SERPL-SCNC: 23.5 MMOL/L (ref 22–29)
COLOR UR: YELLOW
CREAT SERPL-MCNC: 0.63 MG/DL (ref 0.57–1)
DEPRECATED RDW RBC AUTO: 46.5 FL (ref 37–54)
EGFRCR SERPLBLD CKD-EPI 2021: 106.2 ML/MIN/1.73
EOSINOPHIL # BLD AUTO: 0.01 10*3/MM3 (ref 0–0.4)
EOSINOPHIL NFR BLD AUTO: 0.1 % (ref 0.3–6.2)
ERYTHROCYTE [DISTWIDTH] IN BLOOD BY AUTOMATED COUNT: 13.2 % (ref 12.3–15.4)
GLOBULIN UR ELPH-MCNC: 2 GM/DL
GLUCOSE SERPL-MCNC: 134 MG/DL (ref 65–99)
GLUCOSE UR STRIP-MCNC: NEGATIVE MG/DL
HCT VFR BLD AUTO: 38.1 % (ref 34–46.6)
HGB BLD-MCNC: 12.2 G/DL (ref 12–15.9)
HGB UR QL STRIP.AUTO: NEGATIVE
IMM GRANULOCYTES # BLD AUTO: 0.02 10*3/MM3 (ref 0–0.05)
IMM GRANULOCYTES NFR BLD AUTO: 0.1 % (ref 0–0.5)
KETONES UR QL STRIP: ABNORMAL
LEUKOCYTE ESTERASE UR QL STRIP.AUTO: NEGATIVE
LIPASE SERPL-CCNC: 21 U/L (ref 13–60)
LYMPHOCYTES # BLD AUTO: 0.62 10*3/MM3 (ref 0.7–3.1)
LYMPHOCYTES NFR BLD AUTO: 3.5 % (ref 19.6–45.3)
MCH RBC QN AUTO: 30.3 PG (ref 26.6–33)
MCHC RBC AUTO-ENTMCNC: 32 G/DL (ref 31.5–35.7)
MCV RBC AUTO: 94.5 FL (ref 79–97)
MONOCYTES # BLD AUTO: 1.24 10*3/MM3 (ref 0.1–0.9)
MONOCYTES NFR BLD AUTO: 7 % (ref 5–12)
NEUTROPHILS NFR BLD AUTO: 15.71 10*3/MM3 (ref 1.7–7)
NEUTROPHILS NFR BLD AUTO: 89.2 % (ref 42.7–76)
NITRITE UR QL STRIP: NEGATIVE
PH UR STRIP.AUTO: 7 [PH] (ref 5–8)
PLATELET # BLD AUTO: 364 10*3/MM3 (ref 140–450)
PMV BLD AUTO: 10.3 FL (ref 6–12)
POTASSIUM SERPL-SCNC: 3.9 MMOL/L (ref 3.5–5.2)
PROT SERPL-MCNC: 6.1 G/DL (ref 6–8.5)
PROT UR QL STRIP: NEGATIVE
RBC # BLD AUTO: 4.03 10*6/MM3 (ref 3.77–5.28)
SODIUM SERPL-SCNC: 136 MMOL/L (ref 136–145)
SP GR UR STRIP: 1.02 (ref 1–1.03)
UROBILINOGEN UR QL STRIP: ABNORMAL
WBC NRBC COR # BLD: 17.62 10*3/MM3 (ref 3.4–10.8)

## 2023-11-11 PROCEDURE — 25010000002 HYDROMORPHONE PER 4 MG: Performed by: STUDENT IN AN ORGANIZED HEALTH CARE EDUCATION/TRAINING PROGRAM

## 2023-11-11 PROCEDURE — 25010000002 MORPHINE PER 10 MG: Performed by: EMERGENCY MEDICINE

## 2023-11-11 PROCEDURE — 25010000002 MIDAZOLAM PER 1 MG: Performed by: STUDENT IN AN ORGANIZED HEALTH CARE EDUCATION/TRAINING PROGRAM

## 2023-11-11 PROCEDURE — 96365 THER/PROPH/DIAG IV INF INIT: CPT

## 2023-11-11 PROCEDURE — G0378 HOSPITAL OBSERVATION PER HR: HCPCS

## 2023-11-11 PROCEDURE — 25010000002 DEXAMETHASONE SODIUM PHOSPHATE 20 MG/5ML SOLUTION: Performed by: STUDENT IN AN ORGANIZED HEALTH CARE EDUCATION/TRAINING PROGRAM

## 2023-11-11 PROCEDURE — 25010000002 PIPERACILLIN SOD-TAZOBACTAM PER 1 G: Performed by: EMERGENCY MEDICINE

## 2023-11-11 PROCEDURE — 25010000002 FENTANYL CITRATE (PF) 50 MCG/ML SOLUTION: Performed by: STUDENT IN AN ORGANIZED HEALTH CARE EDUCATION/TRAINING PROGRAM

## 2023-11-11 PROCEDURE — 25010000002 ONDANSETRON PER 1 MG: Performed by: STUDENT IN AN ORGANIZED HEALTH CARE EDUCATION/TRAINING PROGRAM

## 2023-11-11 PROCEDURE — 25810000003 LACTATED RINGERS PER 1000 ML: Performed by: STUDENT IN AN ORGANIZED HEALTH CARE EDUCATION/TRAINING PROGRAM

## 2023-11-11 PROCEDURE — 25010000002 ONDANSETRON PER 1 MG: Performed by: EMERGENCY MEDICINE

## 2023-11-11 PROCEDURE — 25510000001 IOPAMIDOL PER 1 ML: Performed by: EMERGENCY MEDICINE

## 2023-11-11 PROCEDURE — 83690 ASSAY OF LIPASE: CPT | Performed by: EMERGENCY MEDICINE

## 2023-11-11 PROCEDURE — 44970 LAPAROSCOPY APPENDECTOMY: CPT | Performed by: STUDENT IN AN ORGANIZED HEALTH CARE EDUCATION/TRAINING PROGRAM

## 2023-11-11 PROCEDURE — 96375 TX/PRO/DX INJ NEW DRUG ADDON: CPT

## 2023-11-11 PROCEDURE — 99285 EMERGENCY DEPT VISIT HI MDM: CPT | Performed by: EMERGENCY MEDICINE

## 2023-11-11 PROCEDURE — 25810000003 SODIUM CHLORIDE 0.9 % SOLUTION: Performed by: EMERGENCY MEDICINE

## 2023-11-11 PROCEDURE — 25010000002 SUGAMMADEX 200 MG/2ML SOLUTION: Performed by: STUDENT IN AN ORGANIZED HEALTH CARE EDUCATION/TRAINING PROGRAM

## 2023-11-11 PROCEDURE — 74177 CT ABD & PELVIS W/CONTRAST: CPT

## 2023-11-11 PROCEDURE — 25010000002 SUCCINYLCHOLINE PER 20 MG: Performed by: STUDENT IN AN ORGANIZED HEALTH CARE EDUCATION/TRAINING PROGRAM

## 2023-11-11 PROCEDURE — 85025 COMPLETE CBC W/AUTO DIFF WBC: CPT | Performed by: EMERGENCY MEDICINE

## 2023-11-11 PROCEDURE — 25010000002 CEFAZOLIN IN DEXTROSE 2000 MG/ 100 ML SOLUTION: Performed by: STUDENT IN AN ORGANIZED HEALTH CARE EDUCATION/TRAINING PROGRAM

## 2023-11-11 PROCEDURE — 88304 TISSUE EXAM BY PATHOLOGIST: CPT | Performed by: STUDENT IN AN ORGANIZED HEALTH CARE EDUCATION/TRAINING PROGRAM

## 2023-11-11 PROCEDURE — 80053 COMPREHEN METABOLIC PANEL: CPT | Performed by: EMERGENCY MEDICINE

## 2023-11-11 PROCEDURE — 25010000002 PROPOFOL 10 MG/ML EMULSION: Performed by: STUDENT IN AN ORGANIZED HEALTH CARE EDUCATION/TRAINING PROGRAM

## 2023-11-11 PROCEDURE — 25010000002 PHENYLEPHRINE 10 MG/ML SOLUTION: Performed by: STUDENT IN AN ORGANIZED HEALTH CARE EDUCATION/TRAINING PROGRAM

## 2023-11-11 PROCEDURE — 99284 EMERGENCY DEPT VISIT MOD MDM: CPT | Performed by: STUDENT IN AN ORGANIZED HEALTH CARE EDUCATION/TRAINING PROGRAM

## 2023-11-11 PROCEDURE — 81003 URINALYSIS AUTO W/O SCOPE: CPT | Performed by: EMERGENCY MEDICINE

## 2023-11-11 DEVICE — THE ECHELON, ECHELON ENDOPATH™ AND ECHELON FLEX™ FAMILIES OF ENDOSCOPIC LINEAR CUTTERS AND RELOADS ARE STERILE, SINGLE PATIENT USE INSTRUMENTS THAT SIMULTANEOUSLY CUT AND STAPLE TISSUE. THERE ARE SIX STAGGERED ROWS OF STAPLES, THREE ON EITHER SIDE OF THE CUT LINE. THE 45 MM INSTRUMENTS HAVE A STAPLE LINE THATIS APPROXIMATELY 45 MM LONG AND A CUT LINE THAT IS APPROXIMATELY 42 MM LONG. THE SHAFT CAN ROTATE FREELY IN BOTH DIRECTIONS AND AN ARTICULATION MECHANISM ON ARTICULATING INSTRUMENTS ENABLES BENDING THE DISTAL PORTIONOF THE SHAFT TO FACILITATE LATERAL ACCESS OF THE OPERATIVE SITE.THE INSTRUMENTS ARE SHIPPED WITHOUT A RELOAD AND MUST BE LOADED PRIOR TO USE. A STAPLE RETAINING CAP ON THE RELOAD PROTECTS THE STAPLE LEG POINTS DURING SHIPPING AND TRANSPORTATION. THE INSTRUMENTS’ LOCK-OUT FEATURE IS DESIGNED TO PREVENT A USED RELOAD FROM BEING REFIRED.
Type: IMPLANTABLE DEVICE | Site: ABDOMEN | Status: FUNCTIONAL
Brand: ECHELON ENDOPATH

## 2023-11-11 RX ORDER — HYDROMORPHONE HYDROCHLORIDE 1 MG/ML
0.5 INJECTION, SOLUTION INTRAMUSCULAR; INTRAVENOUS; SUBCUTANEOUS
Status: DISCONTINUED | OUTPATIENT
Start: 2023-11-11 | End: 2023-11-11 | Stop reason: HOSPADM

## 2023-11-11 RX ORDER — CEFAZOLIN SODIUM 2 G/100ML
2000 INJECTION, SOLUTION INTRAVENOUS ONCE
Status: COMPLETED | OUTPATIENT
Start: 2023-11-11 | End: 2023-11-11

## 2023-11-11 RX ORDER — EPHEDRINE SULFATE 50 MG/ML
5 INJECTION, SOLUTION INTRAVENOUS ONCE AS NEEDED
Status: DISCONTINUED | OUTPATIENT
Start: 2023-11-11 | End: 2023-11-11 | Stop reason: HOSPADM

## 2023-11-11 RX ORDER — FLUMAZENIL 0.1 MG/ML
0.2 INJECTION INTRAVENOUS AS NEEDED
Status: DISCONTINUED | OUTPATIENT
Start: 2023-11-11 | End: 2023-11-11 | Stop reason: HOSPADM

## 2023-11-11 RX ORDER — PHENYLEPHRINE HYDROCHLORIDE 10 MG/ML
INJECTION INTRAVENOUS AS NEEDED
Status: DISCONTINUED | OUTPATIENT
Start: 2023-11-11 | End: 2023-11-11 | Stop reason: SURG

## 2023-11-11 RX ORDER — PROMETHAZINE HYDROCHLORIDE 25 MG/1
25 SUPPOSITORY RECTAL ONCE AS NEEDED
Status: DISCONTINUED | OUTPATIENT
Start: 2023-11-11 | End: 2023-11-11 | Stop reason: HOSPADM

## 2023-11-11 RX ORDER — EPHEDRINE SULFATE 50 MG/ML
INJECTION, SOLUTION INTRAVENOUS AS NEEDED
Status: DISCONTINUED | OUTPATIENT
Start: 2023-11-11 | End: 2023-11-11 | Stop reason: SURG

## 2023-11-11 RX ORDER — LIDOCAINE HYDROCHLORIDE 20 MG/ML
INJECTION, SOLUTION INFILTRATION; PERINEURAL AS NEEDED
Status: DISCONTINUED | OUTPATIENT
Start: 2023-11-11 | End: 2023-11-11 | Stop reason: SURG

## 2023-11-11 RX ORDER — NALOXONE HCL 0.4 MG/ML
0.2 VIAL (ML) INJECTION AS NEEDED
Status: DISCONTINUED | OUTPATIENT
Start: 2023-11-11 | End: 2023-11-11 | Stop reason: HOSPADM

## 2023-11-11 RX ORDER — ONDANSETRON 4 MG/1
4 TABLET, FILM COATED ORAL EVERY 8 HOURS PRN
Qty: 12 TABLET | Refills: 0 | Status: SHIPPED | OUTPATIENT
Start: 2023-11-11 | End: 2023-11-11 | Stop reason: SDUPTHER

## 2023-11-11 RX ORDER — PROMETHAZINE HYDROCHLORIDE 25 MG/1
25 TABLET ORAL ONCE AS NEEDED
Status: DISCONTINUED | OUTPATIENT
Start: 2023-11-11 | End: 2023-11-11 | Stop reason: HOSPADM

## 2023-11-11 RX ORDER — DROPERIDOL 2.5 MG/ML
0.62 INJECTION, SOLUTION INTRAMUSCULAR; INTRAVENOUS
Status: DISCONTINUED | OUTPATIENT
Start: 2023-11-11 | End: 2023-11-11 | Stop reason: HOSPADM

## 2023-11-11 RX ORDER — LABETALOL HYDROCHLORIDE 5 MG/ML
5 INJECTION, SOLUTION INTRAVENOUS
Status: DISCONTINUED | OUTPATIENT
Start: 2023-11-11 | End: 2023-11-11 | Stop reason: HOSPADM

## 2023-11-11 RX ORDER — SODIUM CHLORIDE 0.9 % (FLUSH) 0.9 %
3 SYRINGE (ML) INJECTION EVERY 12 HOURS SCHEDULED
Status: DISCONTINUED | OUTPATIENT
Start: 2023-11-11 | End: 2023-11-11 | Stop reason: HOSPADM

## 2023-11-11 RX ORDER — FLUCONAZOLE 150 MG/1
150 TABLET ORAL ONCE
Qty: 1 TABLET | Refills: 0 | Status: SHIPPED | OUTPATIENT
Start: 2023-11-11 | End: 2023-11-11 | Stop reason: SDUPTHER

## 2023-11-11 RX ORDER — ACETAMINOPHEN 160 MG/5ML
650 SOLUTION ORAL EVERY 4 HOURS PRN
Status: DISCONTINUED | OUTPATIENT
Start: 2023-11-11 | End: 2023-11-11 | Stop reason: HOSPADM

## 2023-11-11 RX ORDER — ACETAMINOPHEN 325 MG/1
650 TABLET ORAL EVERY 4 HOURS PRN
Status: DISCONTINUED | OUTPATIENT
Start: 2023-11-11 | End: 2023-11-11 | Stop reason: HOSPADM

## 2023-11-11 RX ORDER — MORPHINE SULFATE 4 MG/ML
4 INJECTION, SOLUTION INTRAMUSCULAR; INTRAVENOUS ONCE
Status: COMPLETED | OUTPATIENT
Start: 2023-11-11 | End: 2023-11-11

## 2023-11-11 RX ORDER — MORPHINE SULFATE 2 MG/ML
2 INJECTION, SOLUTION INTRAMUSCULAR; INTRAVENOUS EVERY 4 HOURS PRN
Status: DISCONTINUED | OUTPATIENT
Start: 2023-11-11 | End: 2023-11-11 | Stop reason: HOSPADM

## 2023-11-11 RX ORDER — SODIUM CHLORIDE 0.9 % (FLUSH) 0.9 %
3-10 SYRINGE (ML) INJECTION AS NEEDED
Status: DISCONTINUED | OUTPATIENT
Start: 2023-11-11 | End: 2023-11-11 | Stop reason: HOSPADM

## 2023-11-11 RX ORDER — ONDANSETRON 2 MG/ML
4 INJECTION INTRAMUSCULAR; INTRAVENOUS ONCE AS NEEDED
Status: DISCONTINUED | OUTPATIENT
Start: 2023-11-11 | End: 2023-11-11 | Stop reason: HOSPADM

## 2023-11-11 RX ORDER — FAMOTIDINE 20 MG/1
20 TABLET, FILM COATED ORAL 2 TIMES DAILY PRN
Status: DISCONTINUED | OUTPATIENT
Start: 2023-11-11 | End: 2023-11-11 | Stop reason: HOSPADM

## 2023-11-11 RX ORDER — LIDOCAINE HYDROCHLORIDE 10 MG/ML
0.5 INJECTION, SOLUTION INFILTRATION; PERINEURAL ONCE AS NEEDED
Status: DISCONTINUED | OUTPATIENT
Start: 2023-11-11 | End: 2023-11-11 | Stop reason: HOSPADM

## 2023-11-11 RX ORDER — ROCURONIUM BROMIDE 10 MG/ML
INJECTION, SOLUTION INTRAVENOUS AS NEEDED
Status: DISCONTINUED | OUTPATIENT
Start: 2023-11-11 | End: 2023-11-11 | Stop reason: SURG

## 2023-11-11 RX ORDER — HYDRALAZINE HYDROCHLORIDE 20 MG/ML
5 INJECTION INTRAMUSCULAR; INTRAVENOUS
Status: DISCONTINUED | OUTPATIENT
Start: 2023-11-11 | End: 2023-11-11 | Stop reason: HOSPADM

## 2023-11-11 RX ORDER — ONDANSETRON 4 MG/1
4 TABLET, FILM COATED ORAL EVERY 8 HOURS PRN
Qty: 12 TABLET | Refills: 0 | Status: SHIPPED | OUTPATIENT
Start: 2023-11-11 | End: 2023-11-12 | Stop reason: SDUPTHER

## 2023-11-11 RX ORDER — HYDROCODONE BITARTRATE AND ACETAMINOPHEN 5; 325 MG/1; MG/1
1 TABLET ORAL EVERY 4 HOURS PRN
Qty: 8 TABLET | Refills: 0 | Status: SHIPPED | OUTPATIENT
Start: 2023-11-11 | End: 2023-11-18

## 2023-11-11 RX ORDER — HYDROCODONE BITARTRATE AND ACETAMINOPHEN 5; 325 MG/1; MG/1
1 TABLET ORAL EVERY 4 HOURS PRN
Status: DISCONTINUED | OUTPATIENT
Start: 2023-11-11 | End: 2023-11-11 | Stop reason: HOSPADM

## 2023-11-11 RX ORDER — UREA 10 %
3 LOTION (ML) TOPICAL NIGHTLY PRN
Status: DISCONTINUED | OUTPATIENT
Start: 2023-11-11 | End: 2023-11-11 | Stop reason: HOSPADM

## 2023-11-11 RX ORDER — ONDANSETRON 2 MG/ML
4 INJECTION INTRAMUSCULAR; INTRAVENOUS EVERY 6 HOURS PRN
Status: DISCONTINUED | OUTPATIENT
Start: 2023-11-11 | End: 2023-11-11 | Stop reason: HOSPADM

## 2023-11-11 RX ORDER — ONDANSETRON 4 MG/1
4 TABLET, FILM COATED ORAL EVERY 6 HOURS PRN
Status: DISCONTINUED | OUTPATIENT
Start: 2023-11-11 | End: 2023-11-11 | Stop reason: HOSPADM

## 2023-11-11 RX ORDER — THYROID 90 MG/1
90 TABLET ORAL DAILY
Status: DISCONTINUED | OUTPATIENT
Start: 2023-11-11 | End: 2023-11-11 | Stop reason: HOSPADM

## 2023-11-11 RX ORDER — FLUCONAZOLE 150 MG/1
150 TABLET ORAL ONCE
Qty: 1 TABLET | Refills: 0 | Status: SHIPPED | OUTPATIENT
Start: 2023-11-11 | End: 2023-11-12 | Stop reason: SDUPTHER

## 2023-11-11 RX ORDER — MAGNESIUM HYDROXIDE 1200 MG/15ML
LIQUID ORAL AS NEEDED
Status: DISCONTINUED | OUTPATIENT
Start: 2023-11-11 | End: 2023-11-11 | Stop reason: HOSPADM

## 2023-11-11 RX ORDER — FENTANYL CITRATE 50 UG/ML
50 INJECTION, SOLUTION INTRAMUSCULAR; INTRAVENOUS
Status: DISCONTINUED | OUTPATIENT
Start: 2023-11-11 | End: 2023-11-11 | Stop reason: HOSPADM

## 2023-11-11 RX ORDER — DEXAMETHASONE SODIUM PHOSPHATE 4 MG/ML
INJECTION, SOLUTION INTRA-ARTICULAR; INTRALESIONAL; INTRAMUSCULAR; INTRAVENOUS; SOFT TISSUE AS NEEDED
Status: DISCONTINUED | OUTPATIENT
Start: 2023-11-11 | End: 2023-11-11 | Stop reason: SURG

## 2023-11-11 RX ORDER — ROSUVASTATIN CALCIUM 10 MG/1
10 TABLET, COATED ORAL DAILY
Status: DISCONTINUED | OUTPATIENT
Start: 2023-11-11 | End: 2023-11-11 | Stop reason: HOSPADM

## 2023-11-11 RX ORDER — MIDAZOLAM HYDROCHLORIDE 1 MG/ML
1 INJECTION INTRAMUSCULAR; INTRAVENOUS
Status: DISCONTINUED | OUTPATIENT
Start: 2023-11-11 | End: 2023-11-11 | Stop reason: HOSPADM

## 2023-11-11 RX ORDER — PROPOFOL 10 MG/ML
VIAL (ML) INTRAVENOUS AS NEEDED
Status: DISCONTINUED | OUTPATIENT
Start: 2023-11-11 | End: 2023-11-11 | Stop reason: SURG

## 2023-11-11 RX ORDER — SUCCINYLCHOLINE CHLORIDE 20 MG/ML
INJECTION INTRAMUSCULAR; INTRAVENOUS AS NEEDED
Status: DISCONTINUED | OUTPATIENT
Start: 2023-11-11 | End: 2023-11-11 | Stop reason: SURG

## 2023-11-11 RX ORDER — ALPRAZOLAM 0.5 MG/1
0.5 TABLET ORAL NIGHTLY PRN
Status: DISCONTINUED | OUTPATIENT
Start: 2023-11-11 | End: 2023-11-11 | Stop reason: HOSPADM

## 2023-11-11 RX ORDER — HYDROCODONE BITARTRATE AND ACETAMINOPHEN 5; 325 MG/1; MG/1
1 TABLET ORAL ONCE AS NEEDED
Status: COMPLETED | OUTPATIENT
Start: 2023-11-11 | End: 2023-11-11

## 2023-11-11 RX ORDER — DOCUSATE SODIUM 100 MG/1
100 CAPSULE, LIQUID FILLED ORAL 2 TIMES DAILY PRN
Status: DISCONTINUED | OUTPATIENT
Start: 2023-11-11 | End: 2023-11-11 | Stop reason: HOSPADM

## 2023-11-11 RX ORDER — SODIUM CHLORIDE 9 MG/ML
40 INJECTION, SOLUTION INTRAVENOUS AS NEEDED
Status: DISCONTINUED | OUTPATIENT
Start: 2023-11-11 | End: 2023-11-11 | Stop reason: HOSPADM

## 2023-11-11 RX ORDER — CYCLOBENZAPRINE HCL 10 MG
5 TABLET ORAL 3 TIMES DAILY PRN
Status: DISCONTINUED | OUTPATIENT
Start: 2023-11-11 | End: 2023-11-11 | Stop reason: HOSPADM

## 2023-11-11 RX ORDER — DIPHENHYDRAMINE HYDROCHLORIDE 50 MG/ML
12.5 INJECTION INTRAMUSCULAR; INTRAVENOUS
Status: DISCONTINUED | OUTPATIENT
Start: 2023-11-11 | End: 2023-11-11 | Stop reason: HOSPADM

## 2023-11-11 RX ORDER — ONDANSETRON 2 MG/ML
INJECTION INTRAMUSCULAR; INTRAVENOUS AS NEEDED
Status: DISCONTINUED | OUTPATIENT
Start: 2023-11-11 | End: 2023-11-11 | Stop reason: SURG

## 2023-11-11 RX ORDER — ACETAMINOPHEN 650 MG/1
650 SUPPOSITORY RECTAL EVERY 4 HOURS PRN
Status: DISCONTINUED | OUTPATIENT
Start: 2023-11-11 | End: 2023-11-11 | Stop reason: HOSPADM

## 2023-11-11 RX ORDER — BUPIVACAINE HYDROCHLORIDE AND EPINEPHRINE 5; 5 MG/ML; UG/ML
INJECTION, SOLUTION EPIDURAL; INTRACAUDAL; PERINEURAL AS NEEDED
Status: DISCONTINUED | OUTPATIENT
Start: 2023-11-11 | End: 2023-11-11 | Stop reason: HOSPADM

## 2023-11-11 RX ORDER — IPRATROPIUM BROMIDE AND ALBUTEROL SULFATE 2.5; .5 MG/3ML; MG/3ML
3 SOLUTION RESPIRATORY (INHALATION) ONCE AS NEEDED
Status: DISCONTINUED | OUTPATIENT
Start: 2023-11-11 | End: 2023-11-11 | Stop reason: HOSPADM

## 2023-11-11 RX ORDER — SODIUM CHLORIDE, SODIUM LACTATE, POTASSIUM CHLORIDE, CALCIUM CHLORIDE 600; 310; 30; 20 MG/100ML; MG/100ML; MG/100ML; MG/100ML
9 INJECTION, SOLUTION INTRAVENOUS CONTINUOUS
Status: DISCONTINUED | OUTPATIENT
Start: 2023-11-11 | End: 2023-11-11 | Stop reason: HOSPADM

## 2023-11-11 RX ORDER — ONDANSETRON 2 MG/ML
4 INJECTION INTRAMUSCULAR; INTRAVENOUS ONCE
Status: COMPLETED | OUTPATIENT
Start: 2023-11-11 | End: 2023-11-11

## 2023-11-11 RX ORDER — OXYCODONE AND ACETAMINOPHEN 7.5; 325 MG/1; MG/1
1 TABLET ORAL EVERY 4 HOURS PRN
Status: DISCONTINUED | OUTPATIENT
Start: 2023-11-11 | End: 2023-11-11 | Stop reason: HOSPADM

## 2023-11-11 RX ORDER — FENTANYL CITRATE 50 UG/ML
INJECTION, SOLUTION INTRAMUSCULAR; INTRAVENOUS AS NEEDED
Status: DISCONTINUED | OUTPATIENT
Start: 2023-11-11 | End: 2023-11-11 | Stop reason: SURG

## 2023-11-11 RX ADMIN — PROPOFOL 180 MG: 10 INJECTION, EMULSION INTRAVENOUS at 10:29

## 2023-11-11 RX ADMIN — HYDROMORPHONE HYDROCHLORIDE 0.5 MG: 1 INJECTION, SOLUTION INTRAMUSCULAR; INTRAVENOUS; SUBCUTANEOUS at 11:32

## 2023-11-11 RX ADMIN — SODIUM CHLORIDE, POTASSIUM CHLORIDE, SODIUM LACTATE AND CALCIUM CHLORIDE 9 ML/HR: 600; 310; 30; 20 INJECTION, SOLUTION INTRAVENOUS at 10:03

## 2023-11-11 RX ADMIN — FENTANYL CITRATE 50 MCG: 50 INJECTION, SOLUTION INTRAMUSCULAR; INTRAVENOUS at 11:44

## 2023-11-11 RX ADMIN — ONDANSETRON 4 MG: 2 INJECTION INTRAMUSCULAR; INTRAVENOUS at 04:59

## 2023-11-11 RX ADMIN — HYDROCODONE BITARTRATE AND ACETAMINOPHEN 1 TABLET: 5; 325 TABLET ORAL at 11:44

## 2023-11-11 RX ADMIN — FENTANYL CITRATE 50 MCG: 50 INJECTION, SOLUTION INTRAMUSCULAR; INTRAVENOUS at 10:45

## 2023-11-11 RX ADMIN — LIDOCAINE HYDROCHLORIDE 80 MG: 20 INJECTION, SOLUTION INFILTRATION; PERINEURAL at 10:29

## 2023-11-11 RX ADMIN — SUGAMMADEX 200 MG: 100 INJECTION, SOLUTION INTRAVENOUS at 11:08

## 2023-11-11 RX ADMIN — EPHEDRINE SULFATE 10 MG: 50 INJECTION INTRAVENOUS at 10:58

## 2023-11-11 RX ADMIN — ROCURONIUM BROMIDE 30 MG: 10 INJECTION, SOLUTION INTRAVENOUS at 10:42

## 2023-11-11 RX ADMIN — ONDANSETRON 4 MG: 2 INJECTION INTRAMUSCULAR; INTRAVENOUS at 11:03

## 2023-11-11 RX ADMIN — MORPHINE SULFATE 4 MG: 4 INJECTION, SOLUTION INTRAMUSCULAR; INTRAVENOUS at 05:00

## 2023-11-11 RX ADMIN — CEFAZOLIN SODIUM 2000 MG: 2 INJECTION, SOLUTION INTRAVENOUS at 10:31

## 2023-11-11 RX ADMIN — PHENYLEPHRINE HYDROCHLORIDE 100 MCG: 10 INJECTION INTRAVENOUS at 10:52

## 2023-11-11 RX ADMIN — SUCCINYLCHOLINE CHLORIDE 80 MG: 20 INJECTION, SOLUTION INTRAMUSCULAR; INTRAVENOUS; PARENTERAL at 10:30

## 2023-11-11 RX ADMIN — SODIUM CHLORIDE 1000 ML: 9 INJECTION, SOLUTION INTRAVENOUS at 02:26

## 2023-11-11 RX ADMIN — DEXAMETHASONE SODIUM PHOSPHATE 8 MG: 4 INJECTION, SOLUTION INTRAMUSCULAR; INTRAVENOUS at 10:34

## 2023-11-11 RX ADMIN — MIDAZOLAM 1 MG: 1 INJECTION INTRAMUSCULAR; INTRAVENOUS at 10:18

## 2023-11-11 RX ADMIN — PIPERACILLIN AND TAZOBACTAM 3.38 G: 3; .375 INJECTION, POWDER, LYOPHILIZED, FOR SOLUTION INTRAVENOUS at 02:53

## 2023-11-11 RX ADMIN — IOPAMIDOL 100 ML: 755 INJECTION, SOLUTION INTRAVENOUS at 02:07

## 2023-11-11 NOTE — OP NOTE
OPERATIVE REPORT     DATE OF OPERATION: ***    SURGEON:   Jigna Whalen MD  ***    PREOPERATIVE DIAGNOSIS: Acute *** appendicitis    POSTOPERATIVE DIAGNOSIS: Acute *** appendicitis    PROCEDURE PERFORMED: Laparoscopic appendectomy    ANESTHESIA: General    SPECIMEN: Appendix    DRAINS: None    BLOOD LOSS: ***    INDICATIONS FOR OPERATION: *** Katia Puente is a 53 y.o. ***all risks (including bleeding, infection, damage to surrounding structures), benefits, and alternatives were explained to the patient and ***agreed and wished to proceed.  Informed consent was obtained.    OPERATIVE REPORT: The patient was taken to the operating room, transferred onto the operating room table, and underwent general endotracheal anesthesia without incident. The patient was prepped and draped in the usual sterile fashion.  Preoperative antibiotics were given, and a timeout was performed.  Half percent Marcaine with epinephrine was then injected into the skin and subcutaneous tissues. A scalpel was used just superior to the umbilicus and the skin and subcutaneous tissues were dissected to the level of the fascia, which was entered under direct visualization.  0 Vicryl sutures were placed in the fascia.  A Casas port was placed and the abdomen was insufflated.  A lower midline 5 mm port and a left lower quadrant 5 mm port were then placed under direct visualization. ***  The [place] stapler was then used to take the appendix at its base right where it meets the cecum.  Once this was done, the bag was introduced and the appendix was removed from the abdomen.  The area was then irrigated and inspected for hemostasis.  There was no bleeding noted.  The ports were then removed under direct visualization.  The fascial sutures were tied. The incisions were then closed with 4-0 Vicryl sutures and Dermabond.  All needle and lap counts were correct at the end of the case. The patient was awoken from general endotracheal anesthesia and  taken to the recovery area for further monitoring.    CAPO BOWERS M.D.  General and Endoscopic Surgery  [facility]    [address], [address]  [place], [place], [address]  P: [phone]  F: [phone]

## 2023-11-11 NOTE — DISCHARGE SUMMARY
[facility] Medicine Services  DISCHARGE SUMMARY    Patient Name: Katia Puente  : 1970  MRN: [id number]    Date of Admission: 11/10/2023 11:53 PM  Date of Discharge:  2023  Primary Care Physician: Rene Rivera MD    Consults       Date and Time Order Name Status Description    2023  4:11 AM Surgery (on-call MD unless specified) Completed             Hospital Course       Active Hospital Problems    Diagnosis  POA    Hashimoto's thyroiditis [E06.3]  Yes    Heartburn [R12]  Yes    History of total vaginal hysterectomy (TVH) [Z90.710]  Yes    ADD (attention deficit disorder) [F98.8]  Yes    Hypothyroidism [E03.9]  Yes      Resolved Hospital Problems    Diagnosis Date Resolved POA    **Acute appendicitis [K35.80] 2023 Yes    Appendicitis [K37] 2023 Yes    Right lower quadrant abdominal pain [R10.31] 2023 Yes    Sepsis [A41.9] 2023 Yes        Chief Complaint:  Abdominal pain     HPI:  Katia Puente is a 53 y.o. female with past medical history as listed below presents to the emergency room with severe persistent right lower quadrant abdominal pain that has been present for 2 days and is worse with palpation and improved with pain medication and antibiotics at the outside emergency room.  CT scan was notable for appendix inflammation concerning for appendicitis.  Per request from emergency room hospital medicine was requested to admit with surgical consultation for evaluation.    Hospital Course:  Katia Puente is a 53 y.o. female presents the hospital with appendicitis.  Patient underwent laparoscopic appendectomy on 2023.  Please see operative report for full details.  Patient did well following procedure.  She initially had findings of sepsis which have now resolved.  She is afebrile and hemodynamically stable.  She was treated with IV Zosyn while hospitalized.  Surgery following completion medical intervention did not feel further postoperative  antibiotics were indicated and patient was discharged home off antibiotics with source control.  Patient was concerned she could develop yeast infection and was prescribed one-time as needed dose of fluconazole per below.  She was also prescribed as needed Zofran for possible postoperative nausea.  She was prescribed Norco for pain by the surgical service.  Patient will follow-up closely with surgical service for postoperative follow-up.    At the time of discharge patient was told to take all medications as prescribed, keep all follow-up appointments, and call their doctor or return to the hospital with any worsening or concerning symptoms.    Please note that this note was made using Dragon voice recognition software            Day of Discharge         Vital Signs:   Temp:  [97.4 °F (36.3 °C)-98.2 °F (36.8 °C)] 97.4 °F (36.3 °C)  Heart Rate:  [64-96] 93  Resp:  [14-20] 14  BP: ()/(50-92) 131/92     Physical Exam:  Please see physical exam from earlier note    Pertinent  and/or Most Recent Results     Results from last 7 days   Lab Units 11/11/23  0008   WBC 10*3/mm3 17.62*   HEMOGLOBIN g/dL 12.2   HEMATOCRIT % 38.1   PLATELETS 10*3/mm3 364   SODIUM mmol/L 136   POTASSIUM mmol/L 3.9   CHLORIDE mmol/L 102   CO2 mmol/L 23.5   BUN mg/dL 13   CREATININE mg/dL 0.63   GLUCOSE mg/dL 134*   CALCIUM mg/dL 9.4     Results from last 7 days   Lab Units 11/11/23  0008   BILIRUBIN mg/dL 0.5   ALK PHOS U/L 57   ALT (SGPT) U/L 16   AST (SGOT) U/L 12           Imaging Results (All)       Procedure Component Value Units Date/Time    CT Abdomen Pelvis With Contrast [[id number]] Collected: 11/11/23 0227     Updated: 11/11/23 0258    Narrative:      CT OF THE ABDOMEN AND PELVIS WITH CONTRAST     HISTORY: Right lower quadrant pain     COMPARISON: None available.     TECHNIQUE: Axial CT imaging was obtained through the abdomen and pelvis.  IV contrast was administered.     FINDINGS:  Images through the lung bases demonstrate some  dependent atelectasis.  Tiny cyst is noted within the right lobe of the liver. Gallbladder is  absent. The stomach, duodenum, adrenal glands, spleen, and pancreas are  all normal. Kidneys enhance symmetrically. There may be a circumaortic  left renal vein. Uterus is absent. There are is no bowel obstruction.  There is colonic diverticulosis. The patient's appendix is dilated and  fluid-filled. There is some adjacent periappendiceal stranding.  Appearance is concerning for appendicitis. Maximum diameter is 1 cm. No  extraluminal gas or fluid collection is seen to suggest perforation with  abscess formation. There is some mild aortic calcification. No acute  osseous abnormalities are seen. There is thoracolumbar scoliosis, with  convexity to the right.       Impression:      Acute appendicitis. No definite extraluminal gas or fluid collection is  seen to suggest perforation with abscess formation.     Radiation dose reduction techniques were utilized, including automated  exposure control and exposure modulation based on body size.        This report was finalized on 11/11/2023 2:32 AM by Dr. Radha Radford M.D on Workstation: [id number]                           Plan for Follow-up of Pending Labs/Results: surgery clinic follow-up  Pending Labs       Order Current Status    Tissue Pathology Exam Collected (11/11/23 2885)          Discharge Details        Discharge Medications        New Medications        Instructions Start Date   fluconazole 150 MG tablet  Commonly known as: Diflucan   150 mg, Oral, Once      HYDROcodone-acetaminophen 5-325 MG per tablet  Commonly known as: NORCO   1 tablet, Oral, Every 4 Hours PRN             Continue These Medications        Instructions Start Date   ALPRAZolam 0.5 MG tablet  Commonly known as: XANAX   0.5 mg, Oral, Nightly PRN      cyclobenzaprine 5 MG tablet  Commonly known as: FLEXERIL   5 mg, Oral, 3 Times Daily PRN, Avoid with driving. Do not use alcohol with this  prescription and do not take with Xanax.      docusate sodium 100 MG capsule  Commonly known as: Colace   100 mg, Oral, 2 Times Daily      FISH OIL + D3 PO   1 tablet/day, Oral, Daily, HOLDING FOR SURGERY      IRON PO   1 tablet, Oral, Daily, HOLDING FOR SURGERY      meloxicam 15 MG tablet  Commonly known as: MOBIC   TAKE 1 TABLET BY MOUTH EVERY DAY      multivitamin with minerals tablet tablet   1 tablet, Oral, Daily, HOLDING FOR SURGERY       rosuvastatin 10 MG tablet  Commonly known as: CRESTOR   10 mg, Oral, Daily      Thyroid 90 MG tablet  Commonly known as: ARMOUR   90 mg, Oral, Daily      vitamin E 400 UNIT capsule   400 Units, Oral, Daily, HOLDING FOR SURGERY       Vyvanse 50 MG capsule  Generic drug: lisdexamfetamine   50 mg, Oral, Daily, HOLD FOR 48HOURS PRIOR TO SURGERY      Vyvanse 10 MG capsule  Generic drug: lisdexamfetamine dimesylate   TAKE 1 CAPSULE (10 MG) BY MOUTH DAILY IN THE MORNING ALONG WITH THE 50 MG               Allergies   Allergen Reactions    Codeine Nausea And Vomiting    Erythromycin Nausea And Vomiting         Discharge Disposition:  Home or Self Care    Diet:  Hospital:  Diet Order   Procedures    NPO Diet NPO Type: Sips with Meds, Ice Chips       Activity:  Activity Instructions       Other Restrictions (Specify)      Ok to shower. No tub baths or swimming for 2 weeks.   Do not drive on narcotic pain medication. Ok to transition to tylenol and ibuprofen when narcotics are no longer needed.  No heavy lifting for 4 weeks to avoid hernia formation.                 CODE STATUS:    Code Status and Medical Interventions:   Ordered at: 11/11/23 0983     Level Of Support Discussed With:    Patient     Code Status (Patient has no pulse and is not breathing):    CPR (Attempt to Resuscitate)     Medical Interventions (Patient has pulse or is breathing):    Full Support          Additional Instructions for the Follow-ups that You Need to Schedule       Discharge Follow-up with PCP   As  directed       Currently Documented PCP:    Rene Rivera MD    PCP Phone Number:    [phone]     Follow Up Details: 2-4 weeks        Discharge Follow-up with Specified Provider: Dr. Jigna Whalen; 2 Weeks   As directed      To: Dr. Jigna Whalen   Follow Up: 2 Weeks   Follow Up Details: Call [phone] to schedule.        Notify the physician or go the ED for the following conditions:   As directed      Fever, signs of infection, worsening pain, nausea, vomiting    Order Comments: Fever, signs of infection, worsening pain, nausea, vomiting                Follow-up Information       Rene Rivera MD .    Specialty: Family Medicine  Why: 2-4 weeks  Contact information:  [address] [address]  [phone]                                 Zachary Kinney MD  11/11/23      Time Spent on Discharge:  I spent greater than 35 minutes on this discharge activity which included: face-to-face encounter with the patient, reviewing the data in the system, coordination of the care with the nursing staff as well as consultants, documentation, and entering orders.

## 2023-11-11 NOTE — ANESTHESIA PREPROCEDURE EVALUATION
Anesthesia Evaluation     Patient summary reviewed and Nursing notes reviewed   NPO Solid Status: > 8 hours  NPO Liquid Status: > 2 hours           Airway   Mallampati: II  TM distance: >3 FB  Neck ROM: full  Dental - normal exam     Pulmonary - negative pulmonary ROS and normal exam   Cardiovascular - normal exam    (+) valvular problems/murmurs murmur, hyperlipidemia  (-) past MI, CAD, dysrhythmias, angina, CHF, orthopnea, PND, cardiac stents, CABG      Neuro/Psych  (+) numbness, psychiatric history Anxiety  GI/Hepatic/Renal/Endo    (+) thyroid problem hypothyroidism    Musculoskeletal     Abdominal    Substance History - negative use     OB/GYN negative ob/gyn ROS         Other   arthritis,                       Anesthesia Plan    ASA 2     general     intravenous induction     Anesthetic plan, risks, benefits, and alternatives have been provided, discussed and informed consent has been obtained with: patient.

## 2023-11-11 NOTE — H&P
General Surgery history and physical exam    Summary:    This is Katia Puente is a 53 y.o. year old lady who presented to the ER with acute on ruptured appendicitis.  Discussed recommendation for laparoscopic appendectomy.  All risks (including bleeding, infection, damage to surrounding structures), benefits, and alternatives were explained to the patient who agreed and wished to proceed.    We will arrange for colonoscopy at postop visit.    Chief Complaint:    Right lower quadrant abdominal pain    History of Present Illness:    Mrs. Katia Puente is a 53 y.o. year old lady who presented to the ER with right lower quadrant abdominal pain x2 days.  She first noticed this after having a CT scan to monitor a splenic artery aneurysm.  She thought it might be the contrast and waited out.  Her symptoms worsened and she presented back to the ER the following night.  No prior similar events.    Her last colonoscopy was 10 years ago.  She has a family history of colon cancer.  She is due for another.    Past Medical History:   Hypothyroidism    Past Surgical History:    Tonsillectomy  Hysterectomy  Hemorrhoidectomy  Laparoscopic cholecystectomy  Colonoscopy 10 years ago  Breast augmentation  Anterior and posterior vaginal repair    Family History:    Paternal grandfather with colon cancer    Social History:    Denies tobacco use  Occasional alcohol use    Allergies:   Allergies   Allergen Reactions    Codeine Nausea And Vomiting    Erythromycin Nausea And Vomiting       Medications:     Current Facility-Administered Medications:     ceFAZolin in dextrose (ANCEF) IVPB solution 2,000 mg, 2,000 mg, Intravenous, Once, Jigna Whalen MD    piperacillin-tazobactam (ZOSYN) IVPB 3.375 g in 100 mL NS VTB, 3.375 g, Intravenous, Q8H, Peggy Araujo APRN    [COMPLETED] Insert Peripheral IV, , , Once **AND** [MAR Hold] sodium chloride 0.9 % flush 10 mL, 10 mL, Intravenous, PRN, Jose Guadalupe Green,  MD    Radiology/Endoscopy:    CT abdomen/pelvis reviewed: acute non-ruptured appendicitis    Labs:    White blood cell count 17 hemoglobin 12 platelets 364 creatinine 0.63    Review of Systems:   Influenza-like illness: no fever, no  cough, no  sore throat, no  body aches, no loss of sense of taste or smell, no known exposure to person with Covid-19.  Constitutional: Negative for fevers or chills  HENT: Negative for hearing loss or runny nose  Eyes: Negative for vision changes or scleral icterus  Respiratory: Negative for cough or shortness of breath  Cardiovascular: Negative for chest pain or heart palpitations  Gastrointestinal: + for abdominal pain, nausea, denies vomiting, constipation, melena, or hematochezia  Genitourinary: Negative for hematuria or dysuria  Musculoskeletal: Negative for joint swelling or gait instability  Neurologic: Negative for tremors or seizures  Psychiatric: Negative for suicidal ideations or depression  All other systems reviewed and negative    Physical Exam:   Constitutional: Well-developed, well-nourished, no acute distress  Eyes:  Conjunctivae normal, sclerae nonicteric  ENMT: Hearing grossly normal, oral mucosa moist  Neck: Supple, trachea midline  Respiratory: Clear to auscultation, normal inspiratory effort  Cardiovascular: Regular rate, no peripheral edema, no jugular venous distention  Gastrointestinal: Soft, tender in the right lower quadrant, nondistended  Skin:  Warm, dry, no rash on visualized skin surfaces  Musculoskeletal: Symmetric strength, normal gait  Psychiatric: Alert and oriented ×3, normal affect     CAPO BOWERS M.D.  General and Endoscopic Surgery  [facility]    [address], [address]  [place], [place], [address]  P: [phone]  F: [phone]

## 2023-11-11 NOTE — OUTREACH NOTE
Prep Survey      Flowsheet Row Responses   [facility] facility patient discharged from? [place]   Is LACE score < 7 ? Yes   Eligibility [facility]   Date of Admission 11/10/23   Date of Discharge 11/11/23   Discharge Disposition Home or Self Care   Discharge diagnosis Acute appendicitis   Does the patient have one of the following disease processes/diagnoses(primary or secondary)? General Surgery   Does the patient have Home health ordered? No   Is there a DME ordered? No   Medication alerts for this patient see AVS   General alerts for this patient APPENDECTOMY LAPAROSCOPIC   Prep survey completed? Yes            Yahaira SANABRIA - Registered Nurse

## 2023-11-11 NOTE — ANESTHESIA POSTPROCEDURE EVALUATION
"Patient: Katia SNYDER Cindi    Procedure Summary       Date: 11/11/23 Room / Location: [facility] OR  / [facility] MAIN OR    Anesthesia Start: 1021 Anesthesia Stop: 1124    Procedure: APPENDECTOMY LAPAROSCOPIC (Abdomen) Diagnosis:     Surgeons: Jigna Whalen MD Provider: Rene Caba MD    Anesthesia Type: general ASA Status: 2            Anesthesia Type: general    Vitals  Vitals Value Taken Time   /65 11/11/23 1230   Temp 36.3 °C (97.4 °F) 11/11/23 1122   Pulse 65 11/11/23 1233   Resp 15 11/11/23 1200   SpO2 99 % 11/11/23 1233   Vitals shown include unfiled device data.        Post Anesthesia Care and Evaluation    Patient location during evaluation: PACU  Patient participation: complete - patient participated  Level of consciousness: awake and alert  Pain management: adequate    Airway patency: patent  Anesthetic complications: No anesthetic complications    Cardiovascular status: acceptable  Respiratory status: acceptable  Hydration status: acceptable    Comments: /68 (BP Location: Right arm, Patient Position: Lying)   Pulse 62   Temp 36.3 °C (97.4 °F) (Oral)   Resp 15   Ht 157.5 cm (62\")   Wt 55.3 kg (122 lb)   LMP  (LMP Unknown)   SpO2 99%   BMI 22.31 kg/m²       "

## 2023-11-11 NOTE — ANESTHESIA PROCEDURE NOTES
Airway  Urgency: elective    Date/Time: 11/11/2023 10:31 AM  Airway not difficult    General Information and Staff    Patient location during procedure: OR  Anesthesiologist: Rene Caba MD    Indications and Patient Condition  Indications for airway management: airway protection    Preoxygenated: yes  MILS maintained throughout  Mask difficulty assessment: 0 - not attempted    Final Airway Details  Final airway type: endotracheal airway      Successful airway: ETT  Cuffed: yes   Successful intubation technique: direct laryngoscopy  Facilitating devices/methods: intubating stylet  Endotracheal tube insertion site: oral  Blade: Luda  Blade size: 3  ETT size (mm): 7.0  Cormack-Lehane Classification: grade I - full view of glottis  Placement verified by: chest auscultation and capnometry   Cuff volume (mL): 8  Measured from: teeth  ETT/EBT  to teeth (cm): 21  Number of attempts at approach: 1  Assessment: lips, teeth, and gum same as pre-op and atraumatic intubation           ANTICOAGULATION MANAGEMENT     Jose Luis ROCHA Adcox 76 year old male is on warfarin with therapeutic INR result. (Goal INR 1.7-2.3)    Recent labs: (last 7 days)     05/04/23  1348   INR 2.01*       ASSESSMENT       Source(s): Chart Review       Warfarin doses taken: Reviewed in chart    Diet: No new diet changes identified    Medication/supplement changes: None noted    New illness, injury, or hospitalization: Yes: patient is fluid overloaded and is needing hospital admission    Signs or symptoms of bleeding or clotting: No    Previous result: Therapeutic last 2(+) visits    Additional findings: hospital admission on Monday 5/8/23 at the [facility]         PLAN     Recommended plan for ongoing change(s) affecting INR     Dosing Instructions: Continue your current warfarin dose with next INR in 4 days       Summary  As of 5/4/2023    Full warfarin instructions:  4 mg every Thu; 5 mg all other days   Next INR check:  5/8/2023             Detailed voice message left for  spouse Andrea with dosing instructions and follow up date.     Check at provider office visit    Education provided:     Please call back if any changes to your diet, medications or how you've been taking warfarin    Contact [phone] with any changes, questions or concerns.     Plan made per [facility] anticoagulation protocol and per [facility] protocol    SHANELL RUVALCABA RN  Anticoagulation Clinic  5/4/2023    _______________________________________________________________________     Anticoagulation Episode Summary     Current INR goal:  1.7-2.3   TTR:  84.0 % (11.6 mo)   Target end date:  Indefinite   Send INR reminders to:  ANTICOAG [facility]    Indications    Left ventricular assist device present (H) [Z95.811]  Long term (current) use of anticoagulants [Z79.01]  Chronic systolic heart failure (H) [I50.22]  Chronic systolic (congestive) heart failure (H) [I50.22]  Anticoagulated on Coumadin [Z79.01]  Chronic systolic congestive heart failure (H) [I50.22]           Comments:   Follow [facility] Anticoag protocol:Yes: HeartMate 3   Bridging: Enoxaparin   Date VAD placed: 8/1/2019         Anticoagulation Care Providers     Provider Role Specialty Phone number    Karen Celestin MD Referring Cardiovascular Disease [phone]    Arminda Wheeler MD Referring Advanced Heart Failure and Transplant Cardiology [phone]

## 2023-11-11 NOTE — FSED PROVIDER NOTE
"Subjective   History of Present Illness  54yo female pmh significant cholecystectomy/hysterectomy presents ED c/o 1d hx RLQ/suprapubic abdominal pain/characterized as \"dull\"/radiating thru to back/associated nausea, anorexia/neg exac or relieve factors.  ROS (+) subjective fever, headache.  Denies chest pain/soa/cough/dysuria/hematuria/melena/hematochoezia/hematemesis.    History provided by:  Patient  Abdominal Pain  Pain location:  RLQ and suprapubic  Pain quality: dull    Pain radiates to:  Back  Associated symptoms: fever and nausea        Review of Systems   Constitutional:  Positive for appetite change and fever.   HENT: Negative.     Eyes: Negative.    Respiratory: Negative.     Cardiovascular: Negative.    Gastrointestinal:  Positive for abdominal pain and nausea.   Musculoskeletal:  Positive for back pain.   Allergic/Immunologic: Negative for immunocompromised state.   All other systems reviewed and are negative.      Past Medical History:   Diagnosis Date    Allergic     Anxiety     Breast pain, left     Elevated cholesterol     Heart murmur     AS CHILD    Hemorrhoids 05/02/2012    History of 2019 novel coronavirus disease (COVID-19)     Hypothyroidism     Rectocele 10/26/2020    Vaginal atrophy        Allergies   Allergen Reactions    Codeine Nausea And Vomiting    Erythromycin Nausea And Vomiting       Past Surgical History:   Procedure Laterality Date    ANTERIOR AND POSTERIOR VAGINAL REPAIR N/A 12/2/2020    Procedure: POSTERIOR VAGINAL REPAIR;  Surgeon: Rene Kaur MD;  [facility] [facility] [place] [facility] [place]  Service: Obstetrics/Gynecology;  Laterality: N/A;    BACK SURGERY      L4, L5 discectomy    BACK SURGERY  12/23/2019        BREAST SURGERY      CHOLECYSTECTOMY      COLONOSCOPY      ENDOSCOPY  01/2013    ENDOSCOPY N/A 11/11/2021    Procedure: ESOPHAGOGASTRODUODENOSCOPY WITH BIOPSIES ;  Surgeon: Hemant Villanueva MD;  Location: [facility] [place];  Service: Gastroenterology;  Laterality: N/A;  " NORMAL      GALLBLADDER SURGERY  2016    HEMORRHOIDECTOMY      HYSTERECTOMY  2008    VAGINAL    LASIK  1998    TONSILLECTOMY         Family History   Problem Relation Age of Onset    Heart disease Mother     Irritable bowel syndrome Mother     Heart disease Father     Diabetes Paternal Aunt     Stroke Maternal Grandmother     Cancer Paternal Grandfather     Colon cancer Paternal Grandfather     Malig Hyperthermia Neg Hx     Colon polyps Neg Hx     Crohn's disease Neg Hx     Ulcerative colitis Neg Hx        Social History     Socioeconomic History    Marital status:    Tobacco Use    Smoking status: Never    Smokeless tobacco: Never   Vaping Use    Vaping Use: Never used   Substance and Sexual Activity    Alcohol use: Yes     Comment: current some day; occ    Drug use: No    Sexual activity: Not Currently     Partners: Male     Birth control/protection: Surgical           Objective   Physical Exam  Vitals and nursing note reviewed.   Constitutional:       Appearance: She is well-developed.   HENT:      Head: Normocephalic and atraumatic.      Right Ear: External ear normal.      Left Ear: External ear normal.      Nose: Nose normal.      Mouth/Throat:      Mouth: Mucous membranes are moist.      Pharynx: Oropharynx is clear.   Eyes:      Extraocular Movements: Extraocular movements intact.      Pupils: Pupils are equal, round, and reactive to light.   Cardiovascular:      Rate and Rhythm: Normal rate and regular rhythm.      Pulses: Normal pulses.      Heart sounds: Normal heart sounds. No murmur heard.     No friction rub. No gallop.   Pulmonary:      Effort: Pulmonary effort is normal.      Breath sounds: No wheezing, rhonchi or rales.   Abdominal:      General: Abdomen is flat. Bowel sounds are normal.      Palpations: Abdomen is soft.      Tenderness:  in the right lower quadrant There is no right CVA tenderness, left CVA tenderness, guarding or rebound. Positive signs include McBurney's sign. Negative  signs include Hercules's sign and Rovsing's sign.      Hernia: There is no hernia in the umbilical area or ventral area.       Musculoskeletal:         General: No swelling or deformity.      Cervical back: Normal range of motion and neck supple. No rigidity.   Lymphadenopathy:      Cervical: No cervical adenopathy.   Skin:     General: Skin is warm and dry.   Neurological:      General: No focal deficit present.      Mental Status: She is alert and oriented to person, place, and time.         Procedures           ED Course  ED Course as of 11/11/23 0415   Sat Nov 11, 2023   0307 General surgery paged [SD]   0338 General surgery paged [SD]   0407 D/w Dr. Plata, general surgery. Plan admit hospitalist with surgical consult. [SD]   0409 A paged [SD]   0413 D/w [facility] accepting for Dr. Benjamin Luciano. Pt stable transport. [SD]      ED Course User Index  [SD] Jose Guadalupe Green MD      Labs Reviewed   COMPREHENSIVE METABOLIC PANEL - Abnormal; Notable for the following components:       Result Value    Glucose 134 (*)     All other components within normal limits    Narrative:     GFR Normal >60  Chronic Kidney Disease <60  Kidney Failure <15     CBC WITH AUTO DIFFERENTIAL - Abnormal; Notable for the following components:    WBC 17.62 (*)     Neutrophil % 89.2 (*)     Lymphocyte % 3.5 (*)     Eosinophil % 0.1 (*)     Neutrophils, Absolute 15.71 (*)     Lymphocytes, Absolute 0.62 (*)     Monocytes, Absolute 1.24 (*)     All other components within normal limits   URINALYSIS W/ CULTURE IF INDICATED - Abnormal; Notable for the following components:    Appearance, UA Slightly Cloudy (*)     Ketones, UA 15 mg/dL (1+) (*)     All other components within normal limits    Narrative:     In absence of clinical symptoms, the presence of pyuria, bacteria, and/or nitrites on the urinalysis result does not correlate with infection.  Urine microscopic not indicated.   LIPASE - Normal   CBC AND DIFFERENTIAL    Narrative:     The  following orders were created for panel order CBC & Differential.  Procedure                               Abnormality         Status                     ---------                               -----------         ------                     CBC Auto Differential[[id number]]        Abnormal            Final result                 Please view results for these tests on the individual orders.     CT Abdomen Pelvis With Contrast    Result Date: 11/11/2023  Narrative: CT OF THE ABDOMEN AND PELVIS WITH CONTRAST  HISTORY: Right lower quadrant pain  COMPARISON: None available.  TECHNIQUE: Axial CT imaging was obtained through the abdomen and pelvis. IV contrast was administered.  FINDINGS: Images through the lung bases demonstrate some dependent atelectasis. Tiny cyst is noted within the right lobe of the liver. Gallbladder is absent. The stomach, duodenum, adrenal glands, spleen, and pancreas are all normal. Kidneys enhance symmetrically. There may be a circumaortic left renal vein. Uterus is absent. There are is no bowel obstruction. There is colonic diverticulosis. The patient's appendix is dilated and fluid-filled. There is some adjacent periappendiceal stranding. Appearance is concerning for appendicitis. Maximum diameter is 1 cm. No extraluminal gas or fluid collection is seen to suggest perforation with abscess formation. There is some mild aortic calcification. No acute osseous abnormalities are seen. There is thoracolumbar scoliosis, with convexity to the right.      Impression: Acute appendicitis. No definite extraluminal gas or fluid collection is seen to suggest perforation with abscess formation.  Radiation dose reduction techniques were utilized, including automated exposure control and exposure modulation based on body size.   This report was finalized on 11/11/2023 2:32 AM by Dr. Radha Radford M.D on Workstation: [id number]                                          Medical Decision Making  Problems  Addressed:  Acute appendicitis, unspecified acute appendicitis type: complicated acute illness or injury    Amount and/or Complexity of Data Reviewed  Labs: ordered.  Radiology: ordered.    Risk  Prescription drug management.  Decision regarding hospitalization.        Final diagnoses:   Acute appendicitis, unspecified acute appendicitis type       ED Disposition  ED Disposition       ED Disposition   Decision to Admit    Condition   --    Comment   Level of Care: Med/Surg [1]   Admitting Physician: NIKKO AMAYA [[id number]]   Attending Physician: NIKKO AMAYA [[id number]]   Patient Class: Inpatient [101]                 No follow-up provider specified.       Medication List      No changes were made to your prescriptions during this visit.

## 2023-11-11 NOTE — TELEPHONE ENCOUNTER
was d/c from hospital last night, meds sent to pharmacy but pharmacy closed on saturday    Reason for Disposition  • [1] Prescription prescribed recently is not at pharmacy AND [2] triager has access to patient's EMR AND [3] prescription is recorded in the EMR    Additional Information  • Negative: [1] Intentional drug overdose AND [2] suicidal thoughts or ideas  • Negative: Drug overdose and triager unable to answer question  • Negative: Caller requesting a renewal or refill of a medicine patient is currently taking  • Negative: Caller requesting information unrelated to medicine  • Negative: Caller requesting information about COVID-19 Vaccine  • Negative: Caller requesting information about Emergency Contraception  • Negative: Caller requesting information about Combined Birth Control Pills  • Negative: Caller requesting information about Progestin Birth Control Pills  • Negative: Caller requesting information about Post-Op pain or medicines  • Negative: Caller requesting a prescription antibiotic (such as Penicillin) for Strep throat and has a positive culture result  • Negative: Caller requesting a prescription anti-viral med (such as Tamiflu) and has influenza (flu) symptoms  • Negative: Immunization reaction suspected  • Negative: Rash while taking a medicine or within 3 days of stopping it  • Negative: [1] Asthma and [2] having symptoms of asthma (cough, wheezing, etc.)  • Negative: [1] Symptom of illness (e.g., headache, abdominal pain, earache, vomiting) AND [2] more than mild  • Negative: Breastfeeding questions about mother's medicines and diet  • Negative: MORE THAN A DOUBLE DOSE of a prescription or over-the-counter (OTC) drug  • Negative: [1] DOUBLE DOSE (an extra dose or lesser amount) of prescription drug AND [2] any symptoms (e.g., dizziness, nausea, pain, sleepiness)  • Negative: [1] DOUBLE DOSE (an extra dose or lesser amount) of over-the-counter (OTC) drug AND [2] any symptoms (e.g., dizziness,  "nausea, pain, sleepiness)  • Negative: Took another person's prescription drug  • Negative: [1] DOUBLE DOSE (an extra dose or lesser amount) of prescription drug AND [2] NO symptoms  (Exception: A double dose of antibiotics.)  • Negative: Diabetes drug error or overdose (e.g., took wrong type of insulin or took extra dose)  • Negative: [1] Prescription not at pharmacy AND [2] was prescribed by PCP recently (Exception: Triager has access to EMR and prescription is recorded there. Go to Home Care and confirm for pharmacy.)  • Negative: [1] Pharmacy calling with prescription question AND [2] triager unable to answer question  • Negative: [1] Caller has URGENT medicine question about med that PCP or specialist prescribed AND [2] triager unable to answer question  • Negative: Medicine patch causing local rash or itching  • Negative: [1] Caller has medicine question about med NOT prescribed by PCP AND [2] triager unable to answer question (e.g., compatibility with other med, storage)  • Negative: Prescription request for new medicine (not a refill)  • Negative: [1] Caller has NON-URGENT medicine question about med that PCP prescribed AND [2] triager unable to answer question  • Negative: Caller wants to use a complementary or alternative medicine    Answer Assessment - Initial Assessment Questions  1. NAME of MEDICINE: \"What medicine(s) are you calling about?\"      Diflucan and Zofran  2. QUESTION: \"What is your question?\" (e.g., double dose of medicine, side effect)      Did not receive  3. PRESCRIBER: \"Who prescribed the medicine?\" Reason: if prescribed by specialist, call should be referred to that group.      Nirmal  4. SYMPTOMS: \"Do you have any symptoms?\" If Yes, ask: \"What symptoms are you having?\"  \"How bad are the symptoms (e.g., mild, moderate, severe)      na  5. PREGNANCY:  \"Is there any chance that you are pregnant?\" \"When was your last menstrual period?\"      na    Protocols used: Medication Question " Call-ADULT-AH

## 2023-11-11 NOTE — H&P
[facility] Medicine Services  HISTORY AND PHYSICAL    Patient Name: Katia Puente  : 1970  MRN: [id number]  Primary Care Physician: Rene Rivera MD  Date of admission: 11/10/2023    Subjective   Subjective   Chief Complaint:  Abdominal pain    HPI:  Katia Puente is a 53 y.o. female with past medical history as listed below presents to the emergency room with severe persistent right lower quadrant abdominal pain that has been present for 2 days and is worse with palpation and improved with pain medication and antibiotics at the outside emergency room.  CT scan was notable for appendix inflammation concerning for appendicitis.  Per request from emergency room hospital medicine was requested to admit with surgical consultation for evaluation.    Review of Systems   Constitutional: Negative.    HENT: Negative.     Eyes: Negative.    Respiratory: Negative.     Cardiovascular: Negative.    Gastrointestinal:  Positive for abdominal pain. Negative for vomiting.   Endocrine: Negative.    Genitourinary: Negative.    Musculoskeletal:  Positive for back pain.   Skin: Negative.    Allergic/Immunologic: Negative.    Neurological: Negative.    Hematological: Negative.    Psychiatric/Behavioral: Negative.            Personal History     Past Medical History:   Diagnosis Date    Allergic     Anxiety     Breast pain, left     Elevated cholesterol     Heart murmur     AS CHILD    Hemorrhoids 2012    History of 2019 novel coronavirus disease (COVID-19)     Hypothyroidism     Rectocele 10/26/2020    Vaginal atrophy        Past Surgical History:   Procedure Laterality Date    ANTERIOR AND POSTERIOR VAGINAL REPAIR N/A 2020    Procedure: POSTERIOR VAGINAL REPAIR;  Surgeon: Rene Kaur MD;  Location: [facility] [place];  Service: Obstetrics/Gynecology;  Laterality: N/A;    BACK SURGERY      L4, L5 discectomy    BACK SURGERY  2019        BREAST SURGERY      CHOLECYSTECTOMY      COLONOSCOPY       ENDOSCOPY  01/2013    ENDOSCOPY N/A 11/11/2021    Procedure: ESOPHAGOGASTRODUODENOSCOPY WITH BIOPSIES ;  Surgeon: Hemant Villanueva MD;  [place] [facility] [place];  Service: Gastroenterology;  Laterality: N/A;  NORMAL      GALLBLADDER SURGERY  2016    HEMORRHOIDECTOMY      HYSTERECTOMY  2008    VAGINAL    LASIK  1998    TONSILLECTOMY         Family History: family history includes Cancer in her paternal grandfather; Colon cancer in her paternal grandfather; Diabetes in her paternal aunt; Heart disease in her father and mother; Irritable bowel syndrome in her mother; Stroke in her maternal grandmother. Other pertinent FHx was reviewed and unremarkable.     Social History:  reports that she has never smoked. She has never used smokeless tobacco. She reports current alcohol use. She reports that she does not use drugs.  Social History     Social History Narrative    Not on file       Medications:  Available home medication information reviewed.    Allergies   Allergen Reactions    Codeine Nausea And Vomiting    Erythromycin Nausea And Vomiting       Objective   Objective   Vital Signs:   Temp:  [97.8 °F (36.6 °C)-98.2 °F (36.8 °C)] 98 °F (36.7 °C)  Heart Rate:  [64-96] 71  Resp:  [16-20] 20  BP: ()/(50-83) 100/62        Physical Exam   Constitutional: Awake, alert  Eyes: PERRLA, sclerae anicteric, no conjunctival injection  HENT: NCAT, mucous membranes moist  Neck: Supple, no thyromegaly, no lymphadenopathy, trachea midline  Respiratory: No cough or wheezing, normal inspiration, nonlabored respirations   Cardiovascular: Pulse rate initially borderline tachycardic at 96 but improved to normal rate, palpable radial pulses  Gastrointestinal: soft, right lower quadrant tenderness without guarding, nondistended  Musculoskeletal: BMI is 23, normal musculature for age, no bilateral ankle edema, no clubbing or cyanosis to extremities  Psychiatric: Mildly anxious affect, cooperative  Neurologic: Oriented, no slurred  speech or facial droop, conversational and pleasant, able to follow instructions  Skin: No rashes or jaundice      Results from last 7 days   Lab Units 11/11/23  0008   WBC 10*3/mm3 17.62*   HEMOGLOBIN g/dL 12.2   HEMATOCRIT % 38.1   PLATELETS 10*3/mm3 364     Results from last 7 days   Lab Units 11/11/23  0008   SODIUM mmol/L 136   POTASSIUM mmol/L 3.9   CHLORIDE mmol/L 102   CO2 mmol/L 23.5   BUN mg/dL 13   CREATININE mg/dL 0.63   GLUCOSE mg/dL 134*   CALCIUM mg/dL 9.4   ALK PHOS U/L 57   ALT (SGPT) U/L 16   AST (SGOT) U/L 12     Estimated Creatinine Clearance: 90.2 mL/min (by C-G formula based on SCr of 0.63 mg/dL).  Brief Urine Lab Results  (Last result in the past 365 days)        Color   Clarity   Blood   Leuk Est   Nitrite   Protein   CREAT   Urine HCG        11/11/23 0053 Yellow   Slightly Cloudy   Negative   Negative   Negative   Negative                 Imaging Results (Last 24 Hours)       Procedure Component Value Units Date/Time    CT Abdomen Pelvis With Contrast [[id number]] Collected: 11/11/23 0227     Updated: 11/11/23 0258    Narrative:      CT OF THE ABDOMEN AND PELVIS WITH CONTRAST     HISTORY: Right lower quadrant pain     COMPARISON: None available.     TECHNIQUE: Axial CT imaging was obtained through the abdomen and pelvis.  IV contrast was administered.     FINDINGS:  Images through the lung bases demonstrate some dependent atelectasis.  Tiny cyst is noted within the right lobe of the liver. Gallbladder is  absent. The stomach, duodenum, adrenal glands, spleen, and pancreas are  all normal. Kidneys enhance symmetrically. There may be a circumaortic  left renal vein. Uterus is absent. There are is no bowel obstruction.  There is colonic diverticulosis. The patient's appendix is dilated and  fluid-filled. There is some adjacent periappendiceal stranding.  Appearance is concerning for appendicitis. Maximum diameter is 1 cm. No  extraluminal gas or fluid collection is seen to suggest perforation  with  abscess formation. There is some mild aortic calcification. No acute  osseous abnormalities are seen. There is thoracolumbar scoliosis, with  convexity to the right.       Impression:      Acute appendicitis. No definite extraluminal gas or fluid collection is  seen to suggest perforation with abscess formation.     Radiation dose reduction techniques were utilized, including automated  exposure control and exposure modulation based on body size.        This report was finalized on 11/11/2023 2:32 AM by Dr. Radha Radford M.D on Workstation: [id number]                 Assessment & Plan   Assessment & Plan     Active Hospital Problems    Diagnosis  POA    **Acute appendicitis [K35.80]  Yes    Appendicitis [K37]  Yes    Right lower quadrant abdominal pain [R10.31]  Yes    Sepsis [A41.9]  Yes    Hashimoto's thyroiditis [E06.3]  Yes    Heartburn [R12]  Yes     Added automatically from request for surgery [id number]      History of total vaginal hysterectomy (TVH) [Z90.710]  Yes    ADD (attention deficit disorder) [F98.8]  Yes    Hypothyroidism [E03.9]  Yes     53-year-old female presents the hospital with sepsis secondary to appendicitis.    Appendicitis:  General surgery consult  Supportive care and symptomatic treatment  Monitor carefully while requiring IV narcotics.  CT images reviewed no sign of perforation but we will follow-up on operative findings  Broad-spectrum antibiotic coverage with Zosyn initially  Initially met sepsis criteria with leukocytosis and pulse rate but improving    Hypothyroid:  Continue thyroid supplement.  Stable.    Hyperlipidemia: Continue Crestor.  Stable.    ADD: Hold Vyvanse perioperatively and can likely restart tomorrow    Lumbar spine arthritis and history of surgery:  Hold meloxicam this morning    Treatment plan discussed with patient who is in agreement.  Depending on surgical findings patient could possibly be discharged later today or tomorrow pending start further  surgical recommendations.    DVT prophylaxis: Mechanical    CODE STATUS:    Code Status and Medical Interventions:   Ordered at: 11/11/23 0958     Level Of Support Discussed With:    Patient     Code Status (Patient has no pulse and is not breathing):    CPR (Attempt to Resuscitate)     Medical Interventions (Patient has pulse or is breathing):    Full Support       Zachary Kinney MD  11/11/23

## 2023-11-12 ENCOUNTER — PATIENT MESSAGE (OUTPATIENT)
Dept: FAMILY MEDICINE CLINIC | Facility: CLINIC | Age: 53
End: 2023-11-12
Payer: COMMERCIAL

## 2023-11-12 DIAGNOSIS — K35.30 ACUTE APPENDICITIS WITH LOCALIZED PERITONITIS, WITHOUT PERFORATION, ABSCESS, OR GANGRENE: Primary | ICD-10-CM

## 2023-11-12 RX ORDER — ONDANSETRON 4 MG/1
4 TABLET, FILM COATED ORAL EVERY 8 HOURS PRN
Qty: 12 TABLET | Refills: 0 | Status: SHIPPED | OUTPATIENT
Start: 2023-11-12

## 2023-11-12 RX ORDER — FLUCONAZOLE 150 MG/1
150 TABLET ORAL ONCE
Qty: 1 TABLET | Refills: 0 | Status: SHIPPED | OUTPATIENT
Start: 2023-11-12 | End: 2023-11-12

## 2023-11-12 RX ORDER — OXYCODONE HYDROCHLORIDE AND ACETAMINOPHEN 5; 325 MG/1; MG/1
1 TABLET ORAL EVERY 6 HOURS PRN
Qty: 10 TABLET | Refills: 0 | Status: SHIPPED | OUTPATIENT
Start: 2023-11-12

## 2023-11-13 ENCOUNTER — TRANSITIONAL CARE MANAGEMENT TELEPHONE ENCOUNTER (OUTPATIENT)
Dept: CALL CENTER | Facility: HOSPITAL | Age: 53
End: 2023-11-13
Payer: COMMERCIAL

## 2023-11-13 NOTE — OUTREACH NOTE
Call Center TCM Note      Flowsheet Row Responses   [facility] patient discharged from? [place]   Does the patient have one of the following disease processes/diagnoses(primary or secondary)? General Surgery   TCM attempt successful? No   Unsuccessful attempts Attempt 1            Rose Wilcox MA    11/13/2023, 15:37 EST

## 2023-11-13 NOTE — OUTREACH NOTE
Call Center TCM Note      Flowsheet Row Responses   [facility] patient discharged from? [place]   Does the patient have one of the following disease processes/diagnoses(primary or secondary)? General Surgery   TCM attempt successful? No   Unsuccessful attempts Attempt 2            Willa Heck RN    11/13/2023, 16:17 EST

## 2023-11-14 ENCOUNTER — TRANSITIONAL CARE MANAGEMENT TELEPHONE ENCOUNTER (OUTPATIENT)
Dept: CALL CENTER | Facility: HOSPITAL | Age: 53
End: 2023-11-14
Payer: COMMERCIAL

## 2023-11-14 LAB
LAB AP CASE REPORT: NORMAL
PATH REPORT.FINAL DX SPEC: NORMAL
PATH REPORT.GROSS SPEC: NORMAL

## 2023-11-14 NOTE — OUTREACH NOTE
Call Center TCM Note      Flowsheet Row Responses   [facility] patient discharged from? [place]   Does the patient have one of the following disease processes/diagnoses(primary or secondary)? General Surgery   TCM attempt successful? Yes   Call start time 1612   Call end time 1613   General alerts for this patient APPENDECTOMY LAPAROSCOPIC   Meds reviewed with patient/caregiver? Yes   Is the patient having any side effects they believe may be caused by any medication additions or changes? No   Does the patient have all medications related to this admission filled (includes all antibiotics, pain medications, etc.) Yes   Is the patient taking all medications as directed (includes completed medication regime)? Yes   Does the patient have an appointment with their PCP within 7-14 days of discharge? No appointments available   Nursing Interventions Patient desires to follow up with specialty only, Routed TCM call to PCP office, Patient declined scheduling/rescheduling appointment at this time   Has home health visited the patient within 72 hours of discharge? N/A   Psychosocial issues? No   Did the patient receive a copy of their discharge instructions? Yes   Nursing interventions Reviewed instructions with patient   What is the patient's perception of their health status since discharge? Improving   Nursing interventions Nurse provided patient education   Is the patient /caregiver able to teach back basic post-op care? Continue use of incentive spirometry at least 1 week post discharge, Take showers only when approved by MD-sponge bathe until then, Do not remove steri-strips, Lifting as instructed by MD in discharge instructions, No tub bath, swimming, or hot tub until instructed by MD, Practice 'cough and deep breath', Drive as instructed by MD in discharge instructions, Keep incision areas clean,dry and protected   Is the patient/caregiver able to teach back signs and symptoms of incisional infection?  Increased redness, swelling or pain at the incisonal site, Pus or odor from incision, Fever, Increased drainage or bleeding, Incisional warmth   Is the patient/caregiver able to teach back steps to recovery at home? Set small, achievable goals for return to baseline health, Practice good oral hygiene, Eat a well-balance diet, Rest and rebuild strength, gradually increase activity, Weigh daily, Make a list of questions for surgeon's appointment   If the patient is a current smoker, are they able to teach back resources for cessation? Not a smoker   Is the patient/caregiver able to teach back the hierarchy of who to call/visit for symptoms/problems? PCP, Specialist, Home health nurse, Urgent Care, ED, 911 Yes   TCM call completed? Yes   Wrap up additional comments D/C DX: appendectomy - Pt doing well, a little sore, glue is itchy. Pt will be calling to sched POST OP appt. Declines TCM APPT at this time.   Call end time 0777            Rose Wilcox MA    11/14/2023, 16:15 EST

## 2023-11-21 ENCOUNTER — TELEPHONE (OUTPATIENT)
Dept: SURGERY | Facility: CLINIC | Age: 53
End: 2023-11-21
Payer: COMMERCIAL

## 2023-11-21 RX ORDER — TRIAMCINOLONE ACETONIDE 0.25 MG/G
1 OINTMENT TOPICAL 2 TIMES DAILY
Qty: 1 EACH | Refills: 2 | Status: SHIPPED | OUTPATIENT
Start: 2023-11-21

## 2023-11-21 NOTE — TELEPHONE ENCOUNTER
Patient called stating she believes she is having an allergic reaction to the Dermabond s/p lap appy 11/11. She states each incision has a quarter size area that has 'welped up' and that they are very itchy. Most of the glue has been removed. She has tried cortisone cream, benadryl cream and oral benadryl. She also just finished a medrol dose pack that was prescribed for an upper respiratory infection. I advised to make sure the rest of the glue has been removed and to wash the incisions with soap and water. Advised that I would send a message to Dr. Whalen for any additional recommendations.

## 2023-11-27 ENCOUNTER — OFFICE VISIT (OUTPATIENT)
Dept: SURGERY | Facility: CLINIC | Age: 53
End: 2023-11-27
Payer: COMMERCIAL

## 2023-11-27 VITALS — SYSTOLIC BLOOD PRESSURE: 108 MMHG | DIASTOLIC BLOOD PRESSURE: 68 MMHG

## 2023-11-27 DIAGNOSIS — Z12.11 SCREEN FOR COLON CANCER: Primary | ICD-10-CM

## 2023-11-27 PROCEDURE — 99024 POSTOP FOLLOW-UP VISIT: CPT | Performed by: PHYSICIAN ASSISTANT

## 2023-11-28 NOTE — PROGRESS NOTES
C:  Postop follow-up    S:  53-year-old lady returning to the office today status post laparoscopic appendectomy that was performed on 11/10/2023.  She is doing very well since her surgery having had a resolution of her discomfort.  She is having normal bowel movements and eating well.  She has returned to all of her normal activities without difficulty.  Her biggest complaint was a reaction that she had to the skin glue which has resolved since taking steroids and removing the glue.    O:  Vital signs noted  Abdomen is soft, nontender, nondistended, all incisions have healed well    A/P:  I encouraged her to slowly return to her normal activities using her body as her guide.  Pathology was reviewed with her which indicated that she had acute appendicitis, periappendicitis and serositis.  Lastly it was recommended that she proceed with a routine screening colonoscopy and she would like to do this before the end of the year.  We will go ahead and set her up for a routine screening colonoscopy.  All questions were answered and she was willing to proceed with all recommendations.    Gerry Goss PA-C

## 2024-02-17 ENCOUNTER — HOSPITAL ENCOUNTER (EMERGENCY)
Facility: HOSPITAL | Age: 54
Discharge: HOME | End: 2024-02-17
Attending: EMERGENCY MEDICINE | Admitting: EMERGENCY MEDICINE
Payer: COMMERCIAL

## 2024-02-17 ENCOUNTER — APPOINTMENT (OUTPATIENT)
Dept: GENERAL RADIOLOGY | Facility: HOSPITAL | Age: 54
End: 2024-02-17
Payer: COMMERCIAL

## 2024-02-17 ENCOUNTER — APPOINTMENT (OUTPATIENT)
Dept: CT IMAGING | Facility: HOSPITAL | Age: 54
End: 2024-02-17
Payer: COMMERCIAL

## 2024-02-17 VITALS
TEMPERATURE: 97.6 F | WEIGHT: 119 LBS | OXYGEN SATURATION: 98 % | HEIGHT: 62 IN | HEART RATE: 98 BPM | DIASTOLIC BLOOD PRESSURE: 73 MMHG | BODY MASS INDEX: 21.9 KG/M2 | RESPIRATION RATE: 16 BRPM | SYSTOLIC BLOOD PRESSURE: 133 MMHG

## 2024-02-17 DIAGNOSIS — R13.10 DYSPHAGIA, UNSPECIFIED TYPE: Primary | ICD-10-CM

## 2024-02-17 LAB
ALBUMIN SERPL-MCNC: 4.4 G/DL (ref 3.5–5.2)
ALBUMIN/GLOB SERPL: 1.8 G/DL
ALP SERPL-CCNC: 57 U/L (ref 39–117)
ALT SERPL W P-5'-P-CCNC: 21 U/L (ref 1–33)
ANION GAP SERPL CALCULATED.3IONS-SCNC: 10.1 MMOL/L (ref 5–15)
AST SERPL-CCNC: 29 U/L (ref 1–32)
BASOPHILS # BLD AUTO: 0.02 10*3/MM3 (ref 0–0.2)
BASOPHILS NFR BLD AUTO: 0.2 % (ref 0–1.5)
BILIRUB SERPL-MCNC: <0.2 MG/DL (ref 0–1.2)
BUN SERPL-MCNC: 13 MG/DL (ref 6–20)
BUN/CREAT SERPL: 13.1 (ref 7–25)
CALCIUM SPEC-SCNC: 9.2 MG/DL (ref 8.6–10.5)
CHLORIDE SERPL-SCNC: 102 MMOL/L (ref 98–107)
CO2 SERPL-SCNC: 24.9 MMOL/L (ref 22–29)
CREAT SERPL-MCNC: 0.99 MG/DL (ref 0.57–1)
DEPRECATED RDW RBC AUTO: 48.5 FL (ref 37–54)
EGFRCR SERPLBLD CKD-EPI 2021: 68.3 ML/MIN/1.73
EOSINOPHIL # BLD AUTO: 0.06 10*3/MM3 (ref 0–0.4)
EOSINOPHIL NFR BLD AUTO: 0.6 % (ref 0.3–6.2)
ERYTHROCYTE [DISTWIDTH] IN BLOOD BY AUTOMATED COUNT: 13.6 % (ref 12.3–15.4)
GLOBULIN UR ELPH-MCNC: 2.4 GM/DL
GLUCOSE SERPL-MCNC: 88 MG/DL (ref 65–99)
HCT VFR BLD AUTO: 39.5 % (ref 34–46.6)
HGB BLD-MCNC: 13 G/DL (ref 12–15.9)
IMM GRANULOCYTES # BLD AUTO: 0.01 10*3/MM3 (ref 0–0.05)
IMM GRANULOCYTES NFR BLD AUTO: 0.1 % (ref 0–0.5)
LYMPHOCYTES # BLD AUTO: 1.54 10*3/MM3 (ref 0.7–3.1)
LYMPHOCYTES NFR BLD AUTO: 15.9 % (ref 19.6–45.3)
MCH RBC QN AUTO: 31.4 PG (ref 26.6–33)
MCHC RBC AUTO-ENTMCNC: 32.9 G/DL (ref 31.5–35.7)
MCV RBC AUTO: 95.4 FL (ref 79–97)
MONOCYTES # BLD AUTO: 0.81 10*3/MM3 (ref 0.1–0.9)
MONOCYTES NFR BLD AUTO: 8.3 % (ref 5–12)
NEUTROPHILS NFR BLD AUTO: 7.27 10*3/MM3 (ref 1.7–7)
NEUTROPHILS NFR BLD AUTO: 74.9 % (ref 42.7–76)
PLATELET # BLD AUTO: 447 10*3/MM3 (ref 140–450)
PMV BLD AUTO: 9.7 FL (ref 6–12)
POTASSIUM SERPL-SCNC: 4.7 MMOL/L (ref 3.5–5.2)
PROT SERPL-MCNC: 6.8 G/DL (ref 6–8.5)
RBC # BLD AUTO: 4.14 10*6/MM3 (ref 3.77–5.28)
SODIUM SERPL-SCNC: 137 MMOL/L (ref 136–145)
WBC NRBC COR # BLD AUTO: 9.71 10*3/MM3 (ref 3.4–10.8)

## 2024-02-17 PROCEDURE — 80053 COMPREHEN METABOLIC PANEL: CPT | Performed by: EMERGENCY MEDICINE

## 2024-02-17 PROCEDURE — 99283 EMERGENCY DEPT VISIT LOW MDM: CPT | Performed by: EMERGENCY MEDICINE

## 2024-02-17 PROCEDURE — 70491 CT SOFT TISSUE NECK W/DYE: CPT

## 2024-02-17 PROCEDURE — 99285 EMERGENCY DEPT VISIT HI MDM: CPT

## 2024-02-17 PROCEDURE — 85025 COMPLETE CBC W/AUTO DIFF WBC: CPT | Performed by: EMERGENCY MEDICINE

## 2024-02-17 PROCEDURE — 25510000001 IOPAMIDOL PER 1 ML: Performed by: EMERGENCY MEDICINE

## 2024-02-17 PROCEDURE — 71046 X-RAY EXAM CHEST 2 VIEWS: CPT

## 2024-02-17 RX ORDER — THYROID 90 MG/1
90 TABLET ORAL DAILY
COMMUNITY

## 2024-02-17 RX ADMIN — IOPAMIDOL 100 ML: 755 INJECTION, SOLUTION INTRAVENOUS at 19:34

## 2024-02-17 NOTE — FSED PROVIDER NOTE
Subjective   History of Present Illness  54yo female pmh significant hypothyroidism presents ED c/o 1 month hx intermittent solid food dysphagia associated regurgitation swallowed food/neck pain.  Denies fever/chills/nausea/hematemesis/odynophagia/soa/chest pain.    History provided by:  Patient  Difficulty Swallowing  Associated symptoms: no ear pain and no sore throat        Review of Systems   Constitutional: Negative.    HENT:  Positive for trouble swallowing. Negative for ear pain and sore throat.    Eyes: Negative.    Respiratory: Negative.     Cardiovascular: Negative.    Gastrointestinal: Negative.    Musculoskeletal:  Positive for neck pain.   Allergic/Immunologic: Negative for immunocompromised state.   All other systems reviewed and are negative.      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  Had hemorrhoid surgery    Rectocele 10/26/2020    Vaginal atrophy        Allergies   Allergen Reactions    Codeine Nausea And Vomiting    Dermabond [Wound Dressing Adhesive] Itching and Rash    Erythromycin Nausea And Vomiting       Past Surgical History:   Procedure Laterality Date    ANTERIOR AND POSTERIOR VAGINAL REPAIR N/A 12/02/2020    Procedure: POSTERIOR VAGINAL REPAIR;  Surgeon: Rene Kaur MD;  Location: [facility];  Service: Obstetrics/Gynecology;  Laterality: N/A;    APPENDECTOMY N/A 11/11/2023    Procedure: APPENDECTOMY LAPAROSCOPIC;  Surgeon: Jigna Whalen MD;  Location: [facility];  Service: General;  Laterality: N/A;    BACK SURGERY       L4, L5 discectomy    BACK SURGERY  12/23/2019        BREAST AUGMENTATION  2012    BREAST SURGERY      CHOLECYSTECTOMY      COLONOSCOPY      ENDOSCOPY  01/2013    ENDOSCOPY N/A 11/11/2021    Procedure: ESOPHAGOGASTRODUODENOSCOPY WITH BIOPSIES ;  Surgeon: Hemant Villanueva MD;  Location: [facility] MAIN OR;  Service: Gastroenterology;  Laterality: N/A;  NORMAL      EYE SURGERY      1998. Lasic eye surgery    GALLBLADDER SURGERY  2016    HEMORRHOIDECTOMY      HYSTERECTOMY  2008    VAGINAL    LASIK  1998    TONSILLECTOMY         Family History   Problem Relation Age of Onset    Heart disease Mother     Irritable bowel syndrome Mother     Arthritis Mother     COPD Mother     Heart disease Father     Alcohol abuse Father         He controlled it and didnt dribkosy his life    Arthritis Father     Diabetes Paternal Aunt     Stroke Maternal Grandmother     Cancer Paternal Grandfather     Colon cancer Paternal Grandfather     Malig Hyperthermia Neg Hx     Colon polyps Neg Hx     Crohn's disease Neg Hx     Ulcerative colitis Neg Hx        Social History     Socioeconomic History    Marital status:    Tobacco Use    Smoking status: Never    Smokeless tobacco: Never   Vaping Use    Vaping Use: Never used   Substance and Sexual Activity    Alcohol use: Yes     Alcohol/week: 1.0 - 2.0 standard drink of alcohol     Types: 1 - 2 Shots of liquor per week     Comment: current some day; occ    Drug use: No    Sexual activity: Yes     Partners: Male     Birth control/protection: Surgical     Comment:            Objective   Physical Exam  Vitals and nursing note reviewed.   Constitutional:       Appearance: Normal appearance.   HENT:      Head: Normocephalic and atraumatic.      Right Ear: Tympanic membrane, ear canal and external ear normal.      Left Ear: Tympanic membrane, ear canal and external ear normal.      Nose: Nose normal.      Mouth/Throat:      Mouth: Mucous membranes are moist.      Pharynx:  Oropharynx is clear. No oropharyngeal exudate or posterior oropharyngeal erythema.   Eyes:      Conjunctiva/sclera: Conjunctivae normal.      Pupils: Pupils are equal, round, and reactive to light.   Cardiovascular:      Rate and Rhythm: Normal rate and regular rhythm.      Pulses: Normal pulses.      Heart sounds: Normal heart sounds. No murmur heard.     No friction rub. No gallop.   Pulmonary:      Effort: Pulmonary effort is normal. No respiratory distress.      Breath sounds: Normal breath sounds. No stridor. No wheezing, rhonchi or rales.   Abdominal:      General: Abdomen is flat. Bowel sounds are normal. There is no distension.      Palpations: Abdomen is soft.      Tenderness: There is no abdominal tenderness.   Musculoskeletal:         General: No swelling or deformity. Normal range of motion.      Cervical back: Normal range of motion and neck supple. No rigidity.   Lymphadenopathy:      Cervical: No cervical adenopathy.   Neurological:      General: No focal deficit present.      Mental Status: She is alert and oriented to person, place, and time.      GCS: GCS eye subscore is 4. GCS verbal subscore is 5. GCS motor subscore is 6.         Procedures           ED Course  ED Course as of 02/17/24 2024   Sat Feb 17, 2024   [address] Dr[address] Tan 1900hrs. Pending labs/CT neck/disposition. [SD]      ED Course User Index  [SD] Jose Guadalupe Geren MD      Labs Reviewed   CBC WITH AUTO DIFFERENTIAL - Abnormal; Notable for the following components:       Result Value    Lymphocyte % 15.9 (*)     Neutrophils, Absolute 7.27 (*)     All other components within normal limits   COMPREHENSIVE METABOLIC PANEL    Narrative:     GFR Normal >60  Chronic Kidney Disease <60  Kidney Failure <15     CBC AND DIFFERENTIAL    Narrative:     The following orders were created for panel order CBC & Differential.  Procedure                               Abnormality         Status                     ---------                                -----------         ------                     CBC Auto Differential[[id number]]        Abnormal            Final result                 Please view results for these tests on the individual orders.     CT Soft Tissue Neck With Contrast    Result Date: 2/17/2024  Narrative: CT OF THE NECK SOFT TISSUE WITH CONTRAST  HISTORY: Difficulty swallowing  COMPARISON: None available.  TECHNIQUE: Axial CT imaging was obtained through the neck. IV contrast was administered. Coronal and sagittal reformatted images were obtained.  FINDINGS: Visualized portions of the brain parenchyma are grossly unremarkable. Visualized intracranial vessels appear to be patent, although the right sigmoid and transverse sinuses are hypoplastic. The orbits appear unremarkable. There is some minimal mucosal thickening within the ethmoid sinuses. Nasopharynx is normal. Uvula is midline. Epiglottis appears normal. Valleculae and piriform sinuses are unremarkable, as are the vocal cords. Thyroid gland appears mildly atrophic, particularly on the right. There is no prevertebral soft tissue swelling. No acute abnormality of the salivary glands is seen. Images through the lung apices are clear. Cervical vessels appear to be patent. Cervical lymph nodes do not appear pathologically enlarged. No acute osseous abnormalities are seen. Trachea and visualized esophagus appear normal. There is apparent heterogeneous enhancement in the cheeks bilaterally, of uncertain clinical significance.       Impression: No acute findings.  Radiation dose reduction techniques were utilized, including automated exposure control and exposure modulation based on body size.   This report was finalized on 2/17/2024 8:05 PM by Dr. Radha Radford M.D on Workstation: [id number]      Transfercar Thyroid    Result Date: 2/7/2024  Narrative: CLINICAL INDICATION: Female, 53 years old. Hypothyroidism due to Hashimoto's thyroiditis TECHNIQUE: A sonogram of the thyroid gland was performed  assessing gray scale appearance and color Doppler flow. COMPARISON: None FINDINGS: The right lobe measures 2.4 x 0.7 x 0.6 cm. The left lobe measures 2.3 x 0.8 x 0.7 cm. The isthmus measures 8 mm in maximal AP diameter. The echogenicity of the thyroid is heterogeneous. There are no nodules of concern seen. IMPRESSION: Small thyroid gland seen without focal nodule of concern. Gland is heterogeneous. ACR TI-RADS recommendations: TR1 (0 points) : Benign - no FNA or follow up. TR2 (2 points): Not suspicious - no FNA or follow up. TR3 (3 points): Mildly suspicious- FNA if 2.5cm or >, follow up if 1.5 - 2.4cm in 1,3, and 5 years. TR4 (4-6 points): Moderately suspicious- FNA if 1.5cm or >, follow up if 1- 1.4cm in 1,2,3, and 5 years. TR5 (>7 points): Highly suspicious - FNA if  1cm or >, follow up if 0.5 - 0.9cm every years for 5 years. Thyroid Ultrasound Reporting Lexicon: White Paper of the ACR Thyroid Imaging, Reporting and Data System (TIRADS) Committee: J Am Digna Radiol 2015;12:8954-7379. WORKSTATION: [id number]                                        Medical Decision Making  Patient's laboratories and x-rays are all negative.  She will have to follow-up with her GI physician for further workup of her dysphagia.  I explained to her there is nothing anatomic that is causing her issue of pain and difficulty swallowing she will most likely have a physiologic cause which means she will need a scope and an active motion type imagery being done swallowing test scopes that may be able to help discern why she feels the way she does.  But there is nothing here that is going to hurt her.  She will be discharged.    Problems Addressed:  Dysphagia, unspecified type: complicated acute illness or injury    Amount and/or Complexity of Data Reviewed  Labs: ordered.     Details: Patient's laboratories are completely normal the white count is only 9 hemoglobin is 13 these are normal no infection glucose electrolytes everything is normal  liver enzymes all normal no hyperbilirubinemia.  Radiology: ordered.     Details: CT soft tissue neck there is no mass no cause for her dysphagia.  Her chest x-ray is nonacute interpreted by myself normal cardiopulmonary shadow.    Risk  Prescription drug management.        Final diagnoses:   Dysphagia, unspecified type       ED Disposition  ED Disposition       ED Disposition   Discharge    Condition   Stable    Comment   --               Rene Rivera MD  [address] [address]  [phone]    In 3 days           Medication List      No changes were made to your prescriptions during this visit.

## 2024-02-17 NOTE — Clinical Note
[facility]  [address]  [place] [address]    Katia Puente was seen and treated in our emergency department on 2/17/2024.  She may return to work on 02/19/2024.         Thank you for choosing [facility].    Westley Tan MD

## 2024-02-17 NOTE — Clinical Note
[facility]  [address]  [facility] [place] [facility] [address]    Katia Puente was seen and treated in our emergency department on 2/17/2024.  She may return to work on 02/19/2024.         Thank you for choosing [facility].    Westley Tan MD

## 2024-02-18 NOTE — DISCHARGE INSTRUCTIONS
You will need to follow-up with your GI specialist and your primary so that further workup can be done concerning the physiologic active swallowing and what might be causing pain and difficulty to do that but it is not an anatomical issue.  This is a physiologic issue.  No further testing emergency room can do to help discern this she will need to see the GI specialist.

## 2024-03-19 NOTE — ANESTHESIA POSTPROCEDURE EVALUATION
Follow-up with Dr. Talisha Ponce in 6 months or sooner if any cardiac symptoms arise.  Please bring all prescription medications in their original bottles to your next appointment. Thank You    Patient: Katia Puente    Procedure Summary     Date: 12/02/20 Room / Location:  [facility]  /  [facility]    Anesthesia Start: 1307 Anesthesia Stop: 1405    Procedure: POSTERIOR VAGINAL REPAIR (N/A Vagina) Diagnosis:       Rectocele      (Rectocele [N81.6])    Surgeon: Rene Kaur MD Provider: Keyon Valentino MD    Anesthesia Type: general ASA Status: 2          Anesthesia Type: general    Vitals  Vitals Value Taken Time   /84 12/02/20 1445   Temp 36.4 °C (97.6 °F) 12/02/20 1407   Pulse 66 12/02/20 1449   Resp 18 12/02/20 1445   SpO2 97 % 12/02/20 1449   Vitals shown include unvalidated device data.        Post Anesthesia Care and Evaluation    Patient location during evaluation: bedside  Patient participation: complete - patient participated  Level of consciousness: awake and alert  Pain management: adequate  Airway patency: patent  Anesthetic complications: No anesthetic complications    Cardiovascular status: acceptable  Respiratory status: acceptable  Hydration status: acceptable    Comments: /84   Pulse 71   Temp 36.4 °C (97.6 °F) (Temporal)   Resp 18   SpO2 98%

## 2024-03-26 ENCOUNTER — TELEPHONE (OUTPATIENT)
Dept: OBSTETRICS AND GYNECOLOGY | Age: 54
End: 2024-03-26

## 2024-03-26 NOTE — TELEPHONE ENCOUNTER
Caller: Katia Puente    Relationship to patient: Self    Best call back number: [phone]    Chief complaint: PATIENT NEEDS TO BE SCHEDULED FOR HER MAMMOGRAM.     Type of visit: MAMMOGRAM

## 2024-04-08 ENCOUNTER — HOSPITAL ENCOUNTER (OUTPATIENT)
Facility: HOSPITAL | Age: 54
Setting detail: HOSPITAL OUTPATIENT SURGERY
Discharge: HOME | End: 2024-04-08
Attending: STUDENT IN AN ORGANIZED HEALTH CARE EDUCATION/TRAINING PROGRAM | Admitting: STUDENT IN AN ORGANIZED HEALTH CARE EDUCATION/TRAINING PROGRAM
Payer: COMMERCIAL

## 2024-04-08 ENCOUNTER — ANESTHESIA (OUTPATIENT)
Dept: GASTROENTEROLOGY | Facility: HOSPITAL | Age: 54
End: 2024-04-08
Payer: COMMERCIAL

## 2024-04-08 ENCOUNTER — ANESTHESIA EVENT (OUTPATIENT)
Dept: GASTROENTEROLOGY | Facility: HOSPITAL | Age: 54
End: 2024-04-08
Payer: COMMERCIAL

## 2024-04-08 VITALS
BODY MASS INDEX: 21.71 KG/M2 | HEART RATE: 86 BPM | RESPIRATION RATE: 15 BRPM | SYSTOLIC BLOOD PRESSURE: 131 MMHG | OXYGEN SATURATION: 100 % | HEIGHT: 62 IN | DIASTOLIC BLOOD PRESSURE: 83 MMHG | WEIGHT: 118 LBS

## 2024-04-08 LAB — GLUCOSE BLDC GLUCOMTR-MCNC: 70 MG/DL (ref 70–130)

## 2024-04-08 PROCEDURE — 45378 DIAGNOSTIC COLONOSCOPY: CPT | Performed by: STUDENT IN AN ORGANIZED HEALTH CARE EDUCATION/TRAINING PROGRAM

## 2024-04-08 PROCEDURE — 25010000002 PROPOFOL 10 MG/ML EMULSION

## 2024-04-08 PROCEDURE — 25810000003 LACTATED RINGERS PER 1000 ML: Performed by: STUDENT IN AN ORGANIZED HEALTH CARE EDUCATION/TRAINING PROGRAM

## 2024-04-08 PROCEDURE — 82948 REAGENT STRIP/BLOOD GLUCOSE: CPT

## 2024-04-08 RX ORDER — PROPOFOL 10 MG/ML
VIAL (ML) INTRAVENOUS AS NEEDED
Status: DISCONTINUED | OUTPATIENT
Start: 2024-04-08 | End: 2024-04-08 | Stop reason: SURG

## 2024-04-08 RX ORDER — SODIUM CHLORIDE 0.9 % (FLUSH) 0.9 %
10 SYRINGE (ML) INJECTION AS NEEDED
Status: DISCONTINUED | OUTPATIENT
Start: 2024-04-08 | End: 2024-04-08 | Stop reason: HOSPADM

## 2024-04-08 RX ORDER — LIDOCAINE HYDROCHLORIDE 20 MG/ML
INJECTION, SOLUTION INFILTRATION; PERINEURAL AS NEEDED
Status: DISCONTINUED | OUTPATIENT
Start: 2024-04-08 | End: 2024-04-08 | Stop reason: SURG

## 2024-04-08 RX ORDER — SODIUM CHLORIDE, SODIUM LACTATE, POTASSIUM CHLORIDE, CALCIUM CHLORIDE 600; 310; 30; 20 MG/100ML; MG/100ML; MG/100ML; MG/100ML
1000 INJECTION, SOLUTION INTRAVENOUS CONTINUOUS
Status: DISCONTINUED | OUTPATIENT
Start: 2024-04-08 | End: 2024-04-08 | Stop reason: HOSPADM

## 2024-04-08 RX ADMIN — PROPOFOL 80 MG: 10 INJECTION, EMULSION INTRAVENOUS at 12:28

## 2024-04-08 RX ADMIN — PROPOFOL 180 MCG/KG/MIN: 10 INJECTION, EMULSION INTRAVENOUS at 12:29

## 2024-04-08 RX ADMIN — SODIUM CHLORIDE, POTASSIUM CHLORIDE, SODIUM LACTATE AND CALCIUM CHLORIDE 1000 ML: 600; 310; 30; 20 INJECTION, SOLUTION INTRAVENOUS at 12:13

## 2024-04-08 RX ADMIN — PROPOFOL 50 MG: 10 INJECTION, EMULSION INTRAVENOUS at 12:31

## 2024-04-08 RX ADMIN — LIDOCAINE HYDROCHLORIDE 60 MG: 20 INJECTION, SOLUTION INFILTRATION; PERINEURAL at 12:28

## 2024-04-08 NOTE — OP NOTE
PREOPERATIVE DIAGNOSIS:  Screening    POSTOPERATIVE DIAGNOSIS AND FINDINGS:  Normal    PROCEDURE:  Colonoscopy to cecum     SURGEON:  Jigna Bowers MD    ANESTHESIA:  MAC    SPECIMEN(S):  none    DESCRIPTION:  In the decubitus position, a digital rectal exam was performed and was normal. The colonoscope was inserted under direct visualization of the lumen to the cecum, which was confirmed by visualization of the ileocecal valve and appendiceal orifice. The scope was then slowly withdrawn circumferentially examining all mucosal surfaces. There were no mucosal abnormalities.     The quality of bowel preparation good.    The patient tolerated the procedure well.    RECOMMENDATION FOR FUTURE SURVEILLANCE:  10 years    JIGNA BOWERS M.D.  General and Endoscopic Surgery  [facility], [facility]    [address], [address]  [place], [place], [address]  P: [phone]  F: [phone]

## 2024-04-08 NOTE — H&P
GENERAL SURGERY HISTORY AND PHYSICAL     Summary:    Mrs. Katia Puente is a 53 y.o. lady here for screening colonoscopy.     Chief Complaint:    Screening colonoscopy     History of Present Illness:    Mrs. Katia Puente is a 53 y.o. lady here for screening colonoscopy.    Denies bleeding, rectal pain, weight loss.     Denies family history of colon cancer.     Allergies:   Allergies   Allergen Reactions    Codeine Nausea And Vomiting    Dermabond [Wound Dressing Adhesive] Itching and Rash    Erythromycin Nausea And Vomiting       Medications:    Reviewed in Epic       Physical Exam:   Constitutional: Well-developed well-nourished, no acute distress  Eyes: Conjunctiva normal, sclera nonicteric  ENMT: Hearing grossly normal, oral mucosa moist  Neck: Supple, trachea midline  Respiratory: No increased work of breathing, normal inspiratory effort  Cardiovascular: Regular rate, no peripheral edema, no jugular venous distention  Gastrointestinal: Soft, nontender  Skin:  Warm, dry, no rash on visualized skin surfaces  Musculoskeletal: Symmetric strength, normal gait  Psychiatric: Alert and oriented ×3, normal affect   BMI is within normal parameters. No other follow-up for BMI required.      Jigna Whalen M.D.  General and Endoscopic Surgery  [facility]    [address], [address]  [place], [place], [address]  P: [phone]  F: [phone]

## 2024-04-08 NOTE — ANESTHESIA PREPROCEDURE EVALUATION
Anesthesia Evaluation     Patient summary reviewed and Nursing notes reviewed                Airway   Mallampati: II  TM distance: >3 FB  Neck ROM: full  Dental      Pulmonary - negative pulmonary ROS   Cardiovascular     ECG reviewed  Rhythm: regular  Rate: normal    (+) valvular problems/murmurs, DVT, hyperlipidemia      Neuro/Psych  (+) numbness, psychiatric history Anxiety, Depression and ADD  GI/Hepatic/Renal/Endo    (+) GI bleeding , diabetes mellitus, thyroid problem     Musculoskeletal     Abdominal    Substance History   (+) alcohol use     OB/GYN negative ob/gyn ROS         Other   arthritis,                   Anesthesia Plan    ASA 2     MAC     intravenous induction     Anesthetic plan, risks, benefits, and alternatives have been provided, discussed and informed consent has been obtained with: patient.    CODE STATUS:

## 2024-04-08 NOTE — ANESTHESIA POSTPROCEDURE EVALUATION
Patient: Katia Puente    Procedure Summary       Date: 04/08/24 Room / Location: [facility] ENDOSCOPY 10 / [facility] ENDOSCOPY    Anesthesia Start: 1224 Anesthesia Stop: 1247    Procedure: COLONOSCOPY to cecum Diagnosis:       Screen for colon cancer      (Screen for colon cancer [Z12.11])    Surgeons: Jigna Whalen MD Provider: Gael Soares MD    Anesthesia Type: MAC ASA Status: 2            Anesthesia Type: MAC    Vitals  Vitals Value Taken Time   /83 04/08/24 1306   Temp     Pulse 81 04/08/24 1310   Resp 15 04/08/24 1306   SpO2 100 % 04/08/24 1310   Vitals shown include unfiled device data.        Post Anesthesia Care and Evaluation    Patient location during evaluation: PACU  Patient participation: complete - patient participated  Level of consciousness: awake and alert  Pain management: adequate    Airway patency: patent  Anesthetic complications: No anesthetic complications    Cardiovascular status: acceptable  Respiratory status: acceptable  Hydration status: acceptable    Comments: --------------------            04/08/24               1306     --------------------   BP:       131/83     Pulse:      86       Resp:       15       SpO2:      100%     --------------------

## 2024-05-07 ENCOUNTER — TELEPHONE (OUTPATIENT)
Dept: OBSTETRICS AND GYNECOLOGY | Age: 54
End: 2024-05-07
Payer: COMMERCIAL

## 2024-05-07 DIAGNOSIS — Z12.31 VISIT FOR SCREENING MAMMOGRAM: Primary | ICD-10-CM

## 2024-05-17 ENCOUNTER — HOSPITAL ENCOUNTER (OUTPATIENT)
Facility: HOSPITAL | Age: 54
Discharge: HOME | End: 2024-05-17
Admitting: OBSTETRICS & GYNECOLOGY
Payer: COMMERCIAL

## 2024-05-17 DIAGNOSIS — Z12.31 VISIT FOR SCREENING MAMMOGRAM: ICD-10-CM

## 2024-05-17 PROCEDURE — 77063 BREAST TOMOSYNTHESIS BI: CPT

## 2024-05-17 PROCEDURE — 77067 SCR MAMMO BI INCL CAD: CPT

## 2024-05-23 DIAGNOSIS — R92.8 ABNORMAL MAMMOGRAM: Primary | ICD-10-CM

## 2024-05-23 DIAGNOSIS — N64.89 BREAST ASYMMETRY: ICD-10-CM

## 2024-05-28 ENCOUNTER — OFFICE VISIT (OUTPATIENT)
Dept: OBSTETRICS AND GYNECOLOGY | Age: 54
End: 2024-05-28
Payer: COMMERCIAL

## 2024-05-28 VITALS
HEIGHT: 62 IN | WEIGHT: 121 LBS | DIASTOLIC BLOOD PRESSURE: 70 MMHG | BODY MASS INDEX: 22.26 KG/M2 | SYSTOLIC BLOOD PRESSURE: 122 MMHG

## 2024-05-28 DIAGNOSIS — R92.8 ABNORMAL MAMMOGRAM: ICD-10-CM

## 2024-05-28 DIAGNOSIS — Z01.419 ENCOUNTER FOR GYNECOLOGICAL EXAMINATION: Primary | ICD-10-CM

## 2024-05-28 DIAGNOSIS — Z12.31 BREAST CANCER SCREENING BY MAMMOGRAM: ICD-10-CM

## 2024-05-28 PROCEDURE — 99396 PREV VISIT EST AGE 40-64: CPT | Performed by: OBSTETRICS & GYNECOLOGY

## 2024-05-28 NOTE — PROGRESS NOTES
Subjective     Chief Complaint   Patient presents with    Gynecologic Exam     Annual:Last pap ,mammo 24,colonoscopy , Discuss abnormal mammogram/additional views         History of Present Illness  Wellness exam  Katia Puente is a very pleasant 53 y.o. female ., Mammo Exam abnormal mammogram earlier this month, follow-up imaging has been scheduled, Exercise 3 times a week    Katia has had a tough year, her son overdosed and  and her mother also passed of cancer she seems to be coping appropriately    She also has follow-up imaging for an isodense area in her right breast which I was unable to palpate during today's exam    She sees Dr. Yusuf for hormone testing and therapy  .      Obstetric History:  OB History          5    Para   2    Term   2            AB   3    Living             SAB   3    IAB        Ectopic        Molar        Multiple        Live Births                   Menstrual History:     No LMP recorded (lmp unknown). Patient has had a hysterectomy.       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  Had hemorrhoid surgery    Rectocele 10/26/2020    Vaginal atrophy       Past Surgical History:   Procedure Laterality Date    ANTERIOR AND POSTERIOR VAGINAL REPAIR N/A 2020    Procedure: POSTERIOR VAGINAL REPAIR;  Surgeon: Rene Kaur MD;  [place] [facility] [place] [facility] [place]  Service: Obstetrics/Gynecology;  Laterality: N/A;    APPENDECTOMY N/A 2023    Procedure: APPENDECTOMY LAPAROSCOPIC;   "Surgeon: Jigna Whalen MD;  Location: [facility] MAIN OR;  Service: General;  Laterality: N/A;    BACK SURGERY      L4, L5 discectomy    BACK SURGERY  12/23/2019        BREAST AUGMENTATION  2012    BREAST SURGERY      CHOLECYSTECTOMY      COLONOSCOPY      COLONOSCOPY N/A 4/8/2024    Procedure: COLONOSCOPY to cecum;  Surgeon: Jigna Whalen MD;  Location: [facility] ENDOSCOPY;  Service: Gastroenterology;  Laterality: N/A;  pre- screening  post- normal    ENDOSCOPY  01/2013    ENDOSCOPY N/A 11/11/2021    Procedure: ESOPHAGOGASTRODUODENOSCOPY WITH BIOPSIES ;  Surgeon: Hemant Villanueva MD;  [place] [facility] [place];  Service: Gastroenterology;  Laterality: N/A;  NORMAL      EYE SURGERY      1998. Lasic eye surgery    GALLBLADDER SURGERY  2016    HEMORRHOIDECTOMY      HYSTERECTOMY  2008    VAGINAL    LASIK  1998    TONSILLECTOMY         SOCIAL Hx:      The following portions of the patient's history were reviewed and updated as appropriate: allergies, current medications, past family history, past medical history, past social history, past surgical history and problem list.    Review of Systems        Except as outlined in history of physical illness, patient denies any changes in her GYN, , GI systems. All other systems reviewed are negative      Urinary incontinence assessment discussed       Objective   Physical Exam    /70   Ht 157.5 cm (62\")   Wt 54.9 kg (121 lb)   LMP  (LMP Unknown)   BMI 22.13 kg/m²     General: Patient is alert and oriented and appears overall healthy  Neck: Is supple without thyromegaly, no carotid bruits and no lymphadenopathy  Lungs: Clear bilaterally, no wheezing, rhonchi, or rales.  Respiratory rate is normal  Breast: Symmetrical, no lymphadenopathy, no masses, no erythema, no discharge well-healed surgical scars, implants appear normal I was unable to appreciate any masses  Heart: Regular rate and rhythm are appreciated, no murmurs or rubs are heard  Abdomen: Is " soft, without organomegaly, bowel sounds are positive, there is no                                  rebound or guarding and palpation does not produce any discomfort  Back: Nontender without CVA tenderness  Pelvic: External genitalia appear normal and consistent with mature female.  BUS normal                  Urethral meatus is normal without discharge masses or tenderness                Urethra is normal without tenderness                Bladder is not enlarged not tender                            Vagina is clean dry without discharge and appears adequately estrogenized,               no lesions or masses are present              Cervix & Uterus are absent              Adnexa are non-enlarged, non tender               Rectal digital exam reveals adequate sphincter tone and no masses or lesions             are  appreciated on digital rectal examination.         Patient Active Problem List   Diagnosis    Hemorrhoids    Hypothyroidism    Stress disorder, acute    ADD (attention deficit disorder)    Postmenopausal HRT (hormone replacement therapy)    Radiculopathy of leg    Subacute vulvitis    History of total vaginal hysterectomy (TVH)    Intractable pain    Acute midline low back pain with right-sided sciatica    Rectocele    Hypothyroidism    Epigastric pain    Heartburn    Bad taste in mouth    Complication of surgical procedure    Degeneration of lumbar intervertebral disc    Hashimoto's thyroiditis    Scoliosis    Splenic artery aneurysm    Lumbar radiculopathy    Screen for colon cancer    Abnormality of right breast on screening mammogram               Assessment & Plan   Diagnoses and all orders for this visit:    1. Encounter for gynecological examination (Primary)  -     IGP, Apt HPV,rfx 16 / 18,45    2. Breast cancer screening by mammogram    3. Abnormal mammogram    Follow-up imaging is scheduled    Discussed today's findings and concerns with patient.  Continue to recommend regular exercise including  cardiovascular and resistance training as well as  breast self-exam. Wellness lab, mammography, & pap smear, in accordance with age guidelines.    I have encouraged her to call for today's test results if she has not received them within 10 days.  Patient is advised to call with any change in her condition or with any other questions, otherwise return  for annual examination.

## 2024-05-30 ENCOUNTER — APPOINTMENT (OUTPATIENT)
Dept: WOMENS IMAGING | Facility: HOSPITAL | Age: 54
End: 2024-05-30
Payer: COMMERCIAL

## 2024-05-30 PROCEDURE — G0279 TOMOSYNTHESIS, MAMMO: HCPCS | Performed by: RADIOLOGY

## 2024-05-30 PROCEDURE — 77065 DX MAMMO INCL CAD UNI: CPT | Performed by: RADIOLOGY

## 2024-05-30 PROCEDURE — 76642 ULTRASOUND BREAST LIMITED: CPT | Performed by: RADIOLOGY

## 2024-05-30 PROCEDURE — 77061 BREAST TOMOSYNTHESIS UNI: CPT | Performed by: RADIOLOGY

## 2024-05-31 LAB
CYTOLOGIST CVX/VAG CYTO: NORMAL
CYTOLOGY CVX/VAG DOC CYTO: NORMAL
CYTOLOGY CVX/VAG DOC THIN PREP: NORMAL
DX ICD CODE: NORMAL
HPV I/H RISK 4 DNA CVX QL PROBE+SIG AMP: NEGATIVE
Lab: NORMAL
OTHER STN SPEC: NORMAL
STAT OF ADQ CVX/VAG CYTO-IMP: NORMAL

## 2024-12-04 NOTE — PROGRESS NOTES
"Chief Complaint  splenic artery aneurysm     Subjective      HPI: Katia Puente is a 54 y.o. female seen as a new patient for evaluation of splenic artery aneurysm.  Followed by Dr. Zachary Mercer, who will be retiring.  Aneurysm was discovered incidentally about 5 years ago when she was undergoing imaging for follow-up of her spine problems.    Objective   Vital Signs:  /81   Pulse 80   Ht 157.5 cm (62\")   Wt 53.5 kg (118 lb)   BMI 21.58 kg/m²   Estimated body mass index is 21.58 kg/m² as calculated from the following:    Height as of this encounter: 157.5 cm (62\").    Weight as of this encounter: 53.5 kg (118 lb).   BMI is within normal parameters. No other follow-up for BMI required.   Katia Puente  reports that she has never smoked. She has never used smokeless tobacco..     Personal review of data: Dr. Mercer's initial consult note 1/5/2021 in which he describes 1.5 cm partially thrombosed splenic artery aneurysm in the distal splenic artery based on CT 12/18/2020.  Images and dictation from recent CT angiogram 12/4/2024, reviewed with patient and her .     Assessment and Plan     Diagnoses and all orders for this visit:    1. Splenic artery aneurysm (Primary)  -     Duplex Mesenteric Complete CAR; Future    Summary: 54-year-old woman with small, stable splenic artery aneurysm.  Aneurysm was incidentally discovered at the time of CT scan for evaluation of back pain.  Is status post hysterectomy, so no chance of pregnancy.  CT angiogram from 2 days ago demonstrates 1.4 cm partially thrombosed splenic artery aneurysm, unchanged from the initial scan reference by Dr. Mercer in January, 2021 when the aneurysm measured 1.5 cm, also partially thrombosed.  I do not identify the distal splenic artery very well on the current scan.    I think it is unlikely that she is ever going to have a problem with this aneurysm and will unlikely have her require treatment.  Will reassess in 2 years.  Will try " mesenteric arterial duplex scan next time, hoping to minimize radiation and IV contrast, but the partially thrombosed nature of the aneurysm sac may make it more difficult to identify.  Answered questions about blood pressure control.    Follow Up     Return for Follow-up after test.  Patient was given instructions and counseling regarding her condition or for health maintenance advice. Please see specific information pulled into the AVS if appropriate.

## 2024-12-06 ENCOUNTER — OFFICE VISIT (OUTPATIENT)
Age: 54
End: 2024-12-06
Payer: COMMERCIAL

## 2024-12-06 VITALS
SYSTOLIC BLOOD PRESSURE: 116 MMHG | HEART RATE: 80 BPM | WEIGHT: 118 LBS | BODY MASS INDEX: 21.71 KG/M2 | HEIGHT: 62 IN | DIASTOLIC BLOOD PRESSURE: 81 MMHG

## 2024-12-06 DIAGNOSIS — I72.8 SPLENIC ARTERY ANEURYSM: Primary | ICD-10-CM

## 2025-02-19 ENCOUNTER — PATIENT MESSAGE (OUTPATIENT)
Dept: OBSTETRICS AND GYNECOLOGY | Age: 55
End: 2025-02-19
Payer: COMMERCIAL

## 2025-05-29 ENCOUNTER — TELEPHONE (OUTPATIENT)
Dept: OBSTETRICS AND GYNECOLOGY | Age: 55
End: 2025-05-29

## 2025-05-29 DIAGNOSIS — Z12.31 SCREENING MAMMOGRAM FOR BREAST CANCER: Primary | ICD-10-CM

## 2025-05-29 NOTE — TELEPHONE ENCOUNTER
Provider:     Caller: Katia Puente    Relationship to Patient: Self      Phone Number:     Reason for Call: PT HAD TO CANCEL HER ANNUAL APPT ON 6/2/2025 DUE TO HER AND HER SPOUSE HAVING RECENT SURGERY AND UNABLE TO DRIVE CURRENTLY -   PT WOULD LIKE TO KNOW IF SHE CAN SCHEDULE A MAMMOGRAM IN THE MIDDLE OF JUNE EVEN THOUGH NEXT AVAILABLE ANNUAL APPT ISNT TILL END OF DECEMBER.    PT HAS CONCERNS IN REGARDS TO A KNOWN MASS THAT HAS BEEN MONITORED TO WHY SHE HOPES TO STILL BE ABLE TO GET THAT SCHEDULED- PT IS OPEN TO HAVING THE MAMMOGRAM ORDER SENT ELSE WHERE IF NEEDED    PLEASE CALL PT TO DISCUSS    THANK YOU

## 2025-08-06 ENCOUNTER — HOSPITAL ENCOUNTER (OUTPATIENT)
Facility: HOSPITAL | Age: 55
Discharge: HOME | End: 2025-08-06
Admitting: OBSTETRICS & GYNECOLOGY
Payer: COMMERCIAL

## 2025-08-06 ENCOUNTER — OFFICE VISIT (OUTPATIENT)
Dept: OBSTETRICS AND GYNECOLOGY | Age: 55
End: 2025-08-06
Payer: COMMERCIAL

## 2025-08-06 VITALS
HEIGHT: 62 IN | BODY MASS INDEX: 22.56 KG/M2 | WEIGHT: 122.6 LBS | SYSTOLIC BLOOD PRESSURE: 118 MMHG | DIASTOLIC BLOOD PRESSURE: 80 MMHG

## 2025-08-06 DIAGNOSIS — Z12.31 VISIT FOR SCREENING MAMMOGRAM: ICD-10-CM

## 2025-08-06 DIAGNOSIS — Z01.419 ENCOUNTER FOR GYNECOLOGICAL EXAMINATION: Primary | ICD-10-CM

## 2025-08-06 DIAGNOSIS — Z12.31 SCREENING MAMMOGRAM FOR BREAST CANCER: ICD-10-CM

## 2025-08-06 PROCEDURE — 77063 BREAST TOMOSYNTHESIS BI: CPT

## 2025-08-06 PROCEDURE — 77067 SCR MAMMO BI INCL CAD: CPT

## 2025-08-06 RX ORDER — PITAVASTATIN CALCIUM 4.18 MG/1
1 TABLET, FILM COATED ORAL DAILY
COMMUNITY

## 2025-08-06 RX ORDER — MELOXICAM 15 MG/1
TABLET ORAL
COMMUNITY

## 2025-08-06 RX ORDER — LEVOTHYROXINE SODIUM 88 UG/1
88 TABLET ORAL DAILY
COMMUNITY

## 2025-08-06 RX ORDER — VALSARTAN 80 MG/1
80 TABLET ORAL DAILY
COMMUNITY

## 2025-08-06 RX ORDER — TEMAZEPAM 30 MG/1
30 CAPSULE ORAL NIGHTLY PRN
COMMUNITY

## 2025-08-06 RX ORDER — CETIRIZINE HYDROCHLORIDE 5 MG/1
5 TABLET ORAL DAILY
COMMUNITY

## 2025-08-06 RX ORDER — ASPIRIN 81 MG/1
TABLET ORAL
COMMUNITY

## (undated) DEVICE — GLV SURG BIOGEL LTX PF 8

## (undated) DEVICE — ECHELON FLEX45 ENDOPATH STAPLER, ARTICULATING ENDOSCOPIC LINEAR CUTTER (NO CARTRIDGE): Brand: ECHELON ENDOPATH

## (undated) DEVICE — LAPAROVUE VISIBILITY SYSTEM LAPAROSCOPIC SOLUTIONS: Brand: LAPAROVUE

## (undated) DEVICE — SUT VIC 2/0 CT2 27IN J269H

## (undated) DEVICE — APPL CHLORAPREP HI/LITE 26ML ORNG

## (undated) DEVICE — HARMONIC ACE +7 LAPAROSCOPIC SHEARS ADVANCED HEMOSTASIS 5MM DIAMETER 36CM SHAFT LENGTH  FOR USE WITH GRAY HAND PIECE ONLY: Brand: HARMONIC ACE

## (undated) DEVICE — VAGINAL PACKING: Brand: DEROYAL

## (undated) DEVICE — ADAPT CLN BIOGUARD AIR/H2O DISP

## (undated) DEVICE — VIAL FORMLN CAP 10PCT 20ML

## (undated) DEVICE — SYR LUERLOK 20CC BX/50

## (undated) DEVICE — SENSR O2 OXIMAX FNGR A/ 18IN NONSTR

## (undated) DEVICE — NEEDLE, QUINCKE 22GX3.5": Brand: MEDLINE INDUSTRIES, INC.

## (undated) DEVICE — SINGLE-USE BIOPSY FORCEPS: Brand: RADIAL JAW 4

## (undated) DEVICE — SUT GUT CHRM 2/0 SH 27IN G123H

## (undated) DEVICE — OSC VAGINAL HYSTERECTOMY: Brand: MEDLINE INDUSTRIES, INC.

## (undated) DEVICE — SKIN PREP TRAY W/CHG: Brand: MEDLINE INDUSTRIES, INC.

## (undated) DEVICE — LN SMPL CO2 SHTRM SD STREAM W/M LUER

## (undated) DEVICE — MSK ENDO PORT O2 POM ELITE CURAPLEX A/

## (undated) DEVICE — BITEBLOCK OMNI BLOC

## (undated) DEVICE — ENDOPOUCH RETRIEVER SPECIMEN RETRIEVAL BAGS: Brand: ENDOPOUCH RETRIEVER

## (undated) DEVICE — GLV SURG BIOGEL LTX PF 6 1/2

## (undated) DEVICE — ADHS SKIN SURG TISS VISC PREMIERPRO EXOFIN HI/VISC FAST/DRY

## (undated) DEVICE — GOWN ,SIRUS,NONREINFORCED 3XL: Brand: MEDLINE

## (undated) DEVICE — TUBING, SUCTION, 1/4" X 10', STRAIGHT: Brand: MEDLINE

## (undated) DEVICE — LOU LAP CHOLE: Brand: MEDLINE INDUSTRIES, INC.

## (undated) DEVICE — ENDOPATH XCEL BLUNT TIP TROCARS WITH SMOOTH SLEEVES: Brand: ENDOPATH XCEL

## (undated) DEVICE — DISPOSABLE MONOPOLAR ENDOSCOPIC CORD 10 FT. (3M): Brand: KIRWAN

## (undated) DEVICE — KT ORCA ORCAPOD DISP STRL

## (undated) DEVICE — ENDOCUT SCISSOR TIP, DISPOSABLE: Brand: RENEW

## (undated) DEVICE — ENDOPATH XCEL UNIVERSAL TROCAR STABLILITY SLEEVES: Brand: ENDOPATH XCEL

## (undated) DEVICE — ENDOPATH XCEL BLADELESS TROCARS WITH STABILITY SLEEVES: Brand: ENDOPATH XCEL

## (undated) DEVICE — SUT VIC 0/0 UR6 27IN DYED J603H

## (undated) DEVICE — Device

## (undated) DEVICE — IRRIGATOR BULB ASEPTO 60CC STRL

## (undated) DEVICE — SUT MNCRYL PLS ANTIB UD 4/0 PS2 18IN

## (undated) DEVICE — LAPAROSCOPIC SMOKE FILTRATION SYSTEM: Brand: PALL LAPAROSHIELD® PLUS LAPAROSCOPIC SMOKE FILTRATION SYSTEM

## (undated) DEVICE — CANN O2 ETCO2 FITS ALL CONN CO2 SMPL A/ 7IN DISP LF